# Patient Record
Sex: FEMALE | Race: BLACK OR AFRICAN AMERICAN | NOT HISPANIC OR LATINO | URBAN - METROPOLITAN AREA
[De-identification: names, ages, dates, MRNs, and addresses within clinical notes are randomized per-mention and may not be internally consistent; named-entity substitution may affect disease eponyms.]

---

## 2021-08-06 LAB — SARS-COV-2 AG RESP QL IA.RAPID: NEGATIVE

## 2021-08-10 ENCOUNTER — HISTORICAL (OUTPATIENT)
Dept: SURGERY | Facility: HOSPITAL | Age: 36
End: 2021-08-10

## 2022-05-05 NOTE — HISTORICAL [facility]
This is a historical note converted from Dru. Formatting and pictures may have been removed.  Please reference Dru for original formatting and attached multimedia. Indication for Surgery  36-year-old female?desired?improvement in the contour of her abdomen.??Options were discussed with her and she elected to proceed with abdominoplasty rectus muscle plication.??The risk benefits potential complications of surgery discussed in detail.  Preoperative Diagnosis  Unsatisfactory cosmetic appearance  Postoperative Diagnosis  Unsatisfactory cosmetic appearance, small supraumbilical hernia  Operation  1.??Abdominoplasty rectus muscle plication  2.??Primary repair?of small supraumbilical hernia  Surgeon(s)  Jorge Gutierrez  Assistant  None  Anesthesia  General  Findings  Small less than 1 cm?hernia located between the umbilicus and the xiphoid?repaired primarily  Specimen(s)  None  Complications  None  Technique  Patient was identified in the preoperative holding area.??Informed consent was reviewed.??Patient was taken to the operating room placed supine position and general endotracheal anesthesia was provided.??Timeout was taken everyone is in agreement.??She was prepped and draped in sterile surgical fashion.??She was marked?for abdominoplasty?incision was incised with 10 blade scalpel further dissection through subcutaneous tissues achieve electrocautery?dissection continued in a cephalic direction above the abdominal wall fascia elevating the skin and subcutaneous tissue.??The umbilicus was incised around and maintained on its stalk.??A very small supraumbilical hernia?was identified less than 1 cm in size?with a small amount of preperitoneal fat that was reduced back into the hernia this was closed with figure-of-eight?0 PDS sutures.??Dissection continued to the costal margins bilaterally?and to the xiphoid process superiorly.??After this was accomplished rectus muscle plication was performed?after the plication was  marked with methylene blue.??1 loop PDS suture was ran from the xiphoid to the pubis.??This was further reinforced?with 0 Vicryl sutures in interrupted fashion.??Patient was then placed in a reflex?position.??The area of abdominal flap resection was marked.??Skin was incised?and electrocautery was used?to remove?the skin and subcutaneous tissue.??Wound was irrigated hemostasis was insured with electrocautery.??The superficial fascial system in the deep dermis was closed with 0 Vicryl sutures in interrupted fashion?2-0 subcuticular Monocryl was used to approximate?the incision further.??The umbilicus was translocated through a circular incision?after the skin and subcutaneous tissue was removed electrocautery.??It was inset with 3-0 Vicryl sutures in the deep dermis and 3-0 subcuticular Monocryl.??Sterile dressings were applied.??There were no complications

## 2023-01-05 DIAGNOSIS — C50.911 MALIGNANT NEOPLASM OF RIGHT BREAST: Primary | ICD-10-CM

## 2023-01-05 DIAGNOSIS — C50.919 BREAST CANCER: Primary | ICD-10-CM

## 2023-01-23 ENCOUNTER — OFFICE VISIT (OUTPATIENT)
Dept: HEMATOLOGY/ONCOLOGY | Facility: CLINIC | Age: 38
End: 2023-01-23
Payer: MEDICAID

## 2023-01-23 VITALS
TEMPERATURE: 99 F | DIASTOLIC BLOOD PRESSURE: 73 MMHG | OXYGEN SATURATION: 99 % | WEIGHT: 211.19 LBS | RESPIRATION RATE: 18 BRPM | HEIGHT: 66 IN | HEART RATE: 75 BPM | SYSTOLIC BLOOD PRESSURE: 117 MMHG | BODY MASS INDEX: 33.94 KG/M2

## 2023-01-23 DIAGNOSIS — Z17.1 MALIGNANT NEOPLASM OF UPPER-OUTER QUADRANT OF RIGHT BREAST IN FEMALE, ESTROGEN RECEPTOR NEGATIVE: ICD-10-CM

## 2023-01-23 DIAGNOSIS — C50.411 MALIGNANT NEOPLASM OF UPPER-OUTER QUADRANT OF RIGHT BREAST IN FEMALE, ESTROGEN RECEPTOR NEGATIVE: ICD-10-CM

## 2023-01-23 DIAGNOSIS — Z17.1 MALIGNANT NEOPLASM OF UPPER-OUTER QUADRANT OF RIGHT BREAST IN FEMALE, ESTROGEN RECEPTOR NEGATIVE: Primary | ICD-10-CM

## 2023-01-23 DIAGNOSIS — C50.919 BREAST CANCER: ICD-10-CM

## 2023-01-23 DIAGNOSIS — C50.411 MALIGNANT NEOPLASM OF UPPER-OUTER QUADRANT OF RIGHT BREAST IN FEMALE, ESTROGEN RECEPTOR NEGATIVE: Primary | ICD-10-CM

## 2023-01-23 PROCEDURE — 99205 PR OFFICE/OUTPT VISIT, NEW, LEVL V, 60-74 MIN: ICD-10-PCS | Mod: ,,, | Performed by: STUDENT IN AN ORGANIZED HEALTH CARE EDUCATION/TRAINING PROGRAM

## 2023-01-23 PROCEDURE — 1159F PR MEDICATION LIST DOCUMENTED IN MEDICAL RECORD: ICD-10-PCS | Mod: CPTII,,, | Performed by: STUDENT IN AN ORGANIZED HEALTH CARE EDUCATION/TRAINING PROGRAM

## 2023-01-23 PROCEDURE — 3008F BODY MASS INDEX DOCD: CPT | Mod: CPTII,,, | Performed by: STUDENT IN AN ORGANIZED HEALTH CARE EDUCATION/TRAINING PROGRAM

## 2023-01-23 PROCEDURE — 3078F PR MOST RECENT DIASTOLIC BLOOD PRESSURE < 80 MM HG: ICD-10-PCS | Mod: CPTII,,, | Performed by: STUDENT IN AN ORGANIZED HEALTH CARE EDUCATION/TRAINING PROGRAM

## 2023-01-23 PROCEDURE — 3074F PR MOST RECENT SYSTOLIC BLOOD PRESSURE < 130 MM HG: ICD-10-PCS | Mod: CPTII,,, | Performed by: STUDENT IN AN ORGANIZED HEALTH CARE EDUCATION/TRAINING PROGRAM

## 2023-01-23 PROCEDURE — 99205 OFFICE O/P NEW HI 60 MIN: CPT | Mod: ,,, | Performed by: STUDENT IN AN ORGANIZED HEALTH CARE EDUCATION/TRAINING PROGRAM

## 2023-01-23 PROCEDURE — 3078F DIAST BP <80 MM HG: CPT | Mod: CPTII,,, | Performed by: STUDENT IN AN ORGANIZED HEALTH CARE EDUCATION/TRAINING PROGRAM

## 2023-01-23 PROCEDURE — 1159F MED LIST DOCD IN RCRD: CPT | Mod: CPTII,,, | Performed by: STUDENT IN AN ORGANIZED HEALTH CARE EDUCATION/TRAINING PROGRAM

## 2023-01-23 PROCEDURE — 3074F SYST BP LT 130 MM HG: CPT | Mod: CPTII,,, | Performed by: STUDENT IN AN ORGANIZED HEALTH CARE EDUCATION/TRAINING PROGRAM

## 2023-01-23 PROCEDURE — 3008F PR BODY MASS INDEX (BMI) DOCUMENTED: ICD-10-PCS | Mod: CPTII,,, | Performed by: STUDENT IN AN ORGANIZED HEALTH CARE EDUCATION/TRAINING PROGRAM

## 2023-01-23 RX ORDER — POLYETHYLENE GLYCOL 3350, SODIUM SULFATE ANHYDROUS, SODIUM BICARBONATE, SODIUM CHLORIDE, POTASSIUM CHLORIDE 236; 22.74; 6.74; 5.86; 2.97 G/4L; G/4L; G/4L; G/4L; G/4L
POWDER, FOR SOLUTION ORAL
COMMUNITY
Start: 2022-11-07 | End: 2023-01-31

## 2023-01-23 RX ORDER — LINACLOTIDE 290 UG/1
290 CAPSULE, GELATIN COATED ORAL
COMMUNITY
Start: 2022-11-07

## 2023-01-23 NOTE — PROGRESS NOTES
Referring provider:  Dr. Burgess  Reason for consultation:  Triple negative right breast cancer     Patient is a 37-year-old with no significant medical history who noticed a mass right breast in November 2022 which led to further workup including mammogram, ultrasound and biopsy, pathology from biopsy in January 2023 revealed triple negative breast cancer, grade 3.  Patient had further workup including a PET-CT with Dr. Burgess not reveal any distant metastatic disease.  She presented to our clinic on 01/23/2023 to establish care for further disease management.        Today, 01/23/2023, patient reports symptoms of pain around her breast mass site but denies any other foci of pain.  She denies any decreased appetite, weight loss, fevers, chills, headaches, vision changes or focal weakness.    Patient denies any history of smoking, alcohol use or recreational drug use.  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  She has an ECOG of 0.  She has a mother and sister accompanied to her initial visit.  Family history of lung cancer in her grandfather and liver cancer in 1 of her uncles.  No history of breast cancer in the family.      Pathology  01/03/2023 right breast core biopsy and right axillary lymph node core biopsy:  Invasive ductal carcinoma, grade 3, right axillary lymph node biopsy with fragments of poorly differentiated malignant tumor consistent with ductal carcinoma.  Normal lymph node identified.  ER negative, WV negative, HER2 IHC 0, Ki-67 85.3%.        Imaging   01/10/2023 PET-CT:  Hypermetabolic mass in the superior right breast in keeping with the known cancer popliteal additional areas of mild-to-moderate uptake in the superior and retroareolar right breast are understood with definite discrete mass on CT but worrisome for infiltrative breast cancer.  Multiple hypermetabolic right axillary subpectoral and right internal mammary lymph nodes consistent with  metastatic disease.  No other suspicious uptake.  Right breast mass measures 3 by 2.8 cm with an SUV of 13.7.  Inferior to the mass there was mild diffuse uptake associated with non-mass like dense breast tissue with an SUV of 4.0.  There is also abnormal uptake in the retroareolar right breast with definitive discrete mass can not be  from the dense glandular tissue with an SUV of 5.7 and measuring 1.5 x 1.1 cm.  These are concerning for infiltrative malignancies.    Past Medical History:   Diagnosis Date    Breast cancer     GERD (gastroesophageal reflux disease)        Past Surgical History:   Procedure Laterality Date    ABLATION      TUBAL LIGATION         Family History   Problem Relation Age of Onset    Cancer Maternal Grandfather     Diabetes Maternal Grandmother     Cancer Maternal Uncle        Social History     Socioeconomic History    Marital status: Single   Tobacco Use    Smoking status: Never    Smokeless tobacco: Never   Substance and Sexual Activity    Alcohol use: Yes     Comment: occasionally    Drug use: Never    Sexual activity: Yes     Partners: Male       Current Outpatient Medications   Medication Sig Dispense Refill    boric acid (PH-D) 600 mg vaginal suppository INSERT 1 SUPPOSITORY VAGINALLY DAILY AS NEEDED      GOLYTELY 236-22.74-6.74 -5.86 gram suspension Take by mouth.      LINZESS 290 mcg Cap capsule Take 290 mcg by mouth.       No current facility-administered medications for this visit.       Review of patient's allergies indicates:  Not on File      CONSTITUTIONAL: no fevers, no chills, no weight loss, no fatigue, no weakness  HEMATOLOGIC: no abnormal bleeding, no abnormal bruising, no drenching night sweats  ONCOLOGIC: no new masses or lumps  HEENT: no vision loss, no tinnitus or hearing loss, no nose bleeding, no dysphagia, no odynophagia  CVS: no chest pain, no palpitations, no dyspnea on exertion  RESP: no shortness of breath, no hemoptysis, no cough  GI: no nausea, no  vomiting, no diarrhea, no constipation, no melena, no hematochezia, no hematemesis, no abdominal pain, no increase in abdominal girth  : no dysuria, no hematuria, no hesitancy, no scrotal swelling, no discharge  INTEGUMENT: no rashes, no abnormal bruising, no nail pitting, no hyperpigmentation  BREAST:  Right breast pain  NEURO: no falls, no memory loss, no paresthesias or dysesthesias, no urofecal incontinence or retention, no loss of strength on any extremity  MSK: no back pain, no new joint pain, no joint swelling  PSYCH: no suicidal or homicidal ideation, no depression, no insomnia, no anhedonia  ENDOCRINE: no heat or cold intolerance, no polyuria, no polydipsia    Physical Exam:    Vitals:    01/23/23 1108   BP: 117/73   Pulse: 75   Resp: 18   Temp: 98.7 °F (37.1 °C)       ECOG PS 0  GA: AAOx3, NAD  HEENT: NCAT, PERRLA, EOMI, good dentition, moist oral mucous membranes  LYMPH: no cervical, axillary or supraclavicular adenopathy  CVS: s1s2 RRR, no M/R/G  RESP: CTA b/l, no crackles, no wheezes or rhonchi  BREAST:  Right breast with a approximately 4 cm mass in the upper outer quadrant, firm, tender to palpation, without overlying skin changes.  No adenopathy appreciated on my exam in the axilla, supraclavicular or internal mammary region.  Left breast without masses, lumps, skin changes or nipple changes.  ABD: soft, NT, ND, BS+, no hepatosplenomegaly  EXT: no deformities, no pedal edema  SKIN: no rashes, warm and dry  NEURO: normal mentation, strength 5/5 on all 4 extremities, no sensory deficits        Assessment and plan   # Right breast invasive ductal carcinoma, triple negative  ER negative, ID negative, HER2 IHC 0, Ki-67 85%   [id number], stage IIIC   Discussed with patient the diagnosis of triple negative breast cancer, treatment recommendations including neoadjuvant chemotherapy followed by surgery followed by adjuvant chemotherapy + radiation therapy   Reviewed PET-CT without evidence of distant  metastatic disease.  Will plan for treatment with neoadjuvant carbo Taxol pembrolizumab followed by AC pembrolizumab prior to surgical resection   Patient is scheduled for MRI bilateral breasts   Patient is not interested in fertility preservation  She is status post tubal ligation, discuss the need for dual contraception while on chemotherapy.    Plan   Cardiac echo  Chemo education   Genetic counseling   Port placement with surgery ASAP   Plan to initiate carbo Taxol pembrolizumab on 2nd February 2023, lab including urine pregnancy the day prior.    Plan to follow-up on 2nd February 2023 to discuss above workup prior to treatment initiation.      A total of  60 minutes were spent in review of records, interpretation of test, coordination of care, discussion and counseling with the patient.        Portions of the record may have been created with voice recognition software. Occasional wrong-word or sound-a-like substitutions may have occurred due to the inherent limitations of voice recognition software. Read the chart carefully and recognize, using context, where substitutions have occurred.

## 2023-01-24 ENCOUNTER — APPOINTMENT (OUTPATIENT)
Dept: RADIOLOGY | Facility: HOSPITAL | Age: 38
End: 2023-01-24
Attending: SURGERY
Payer: MEDICAID

## 2023-01-24 DIAGNOSIS — C50.911 MALIGNANT NEOPLASM OF RIGHT BREAST: ICD-10-CM

## 2023-01-24 PROCEDURE — 25500020 PHARM REV CODE 255

## 2023-01-24 PROCEDURE — 77049 MRI BREAST C-+ W/CAD BI: CPT | Mod: TC

## 2023-01-24 PROCEDURE — 77049 MRI BREAST W/WO CONTRAST, W/CAD, BILATERAL: ICD-10-PCS | Mod: 26,,, | Performed by: STUDENT IN AN ORGANIZED HEALTH CARE EDUCATION/TRAINING PROGRAM

## 2023-01-24 PROCEDURE — A9577 INJ MULTIHANCE: HCPCS

## 2023-01-24 PROCEDURE — 77049 MRI BREAST C-+ W/CAD BI: CPT | Mod: 26,,, | Performed by: STUDENT IN AN ORGANIZED HEALTH CARE EDUCATION/TRAINING PROGRAM

## 2023-01-24 RX ADMIN — GADOBENATE DIMEGLUMINE 20 ML: 529 INJECTION, SOLUTION INTRAVENOUS at 09:01

## 2023-01-30 ENCOUNTER — OFFICE VISIT (OUTPATIENT)
Dept: HEMATOLOGY/ONCOLOGY | Facility: CLINIC | Age: 38
End: 2023-01-30
Payer: MEDICAID

## 2023-01-30 ENCOUNTER — NURSE TRIAGE (OUTPATIENT)
Dept: ADMINISTRATIVE | Facility: CLINIC | Age: 38
End: 2023-01-30
Payer: MEDICAID

## 2023-01-30 VITALS
BODY MASS INDEX: 33.48 KG/M2 | HEART RATE: 90 BPM | TEMPERATURE: 98 F | RESPIRATION RATE: 18 BRPM | WEIGHT: 208.31 LBS | HEIGHT: 66 IN | OXYGEN SATURATION: 100 % | DIASTOLIC BLOOD PRESSURE: 84 MMHG | SYSTOLIC BLOOD PRESSURE: 137 MMHG

## 2023-01-30 DIAGNOSIS — C50.411 MALIGNANT NEOPLASM OF UPPER-OUTER QUADRANT OF RIGHT BREAST IN FEMALE, ESTROGEN RECEPTOR NEGATIVE: Primary | ICD-10-CM

## 2023-01-30 DIAGNOSIS — Z17.1 MALIGNANT NEOPLASM OF UPPER-OUTER QUADRANT OF RIGHT BREAST IN FEMALE, ESTROGEN RECEPTOR NEGATIVE: Primary | ICD-10-CM

## 2023-01-30 PROCEDURE — 3079F DIAST BP 80-89 MM HG: CPT | Mod: CPTII,,,

## 2023-01-30 PROCEDURE — 99215 OFFICE O/P EST HI 40 MIN: CPT | Mod: ,,,

## 2023-01-30 PROCEDURE — 1159F PR MEDICATION LIST DOCUMENTED IN MEDICAL RECORD: ICD-10-PCS | Mod: CPTII,,,

## 2023-01-30 PROCEDURE — 3075F PR MOST RECENT SYSTOLIC BLOOD PRESS GE 130-139MM HG: ICD-10-PCS | Mod: CPTII,,,

## 2023-01-30 PROCEDURE — 3075F SYST BP GE 130 - 139MM HG: CPT | Mod: CPTII,,,

## 2023-01-30 PROCEDURE — 1159F MED LIST DOCD IN RCRD: CPT | Mod: CPTII,,,

## 2023-01-30 PROCEDURE — 3079F PR MOST RECENT DIASTOLIC BLOOD PRESSURE 80-89 MM HG: ICD-10-PCS | Mod: CPTII,,,

## 2023-01-30 PROCEDURE — 3008F PR BODY MASS INDEX (BMI) DOCUMENTED: ICD-10-PCS | Mod: CPTII,,,

## 2023-01-30 PROCEDURE — 3008F BODY MASS INDEX DOCD: CPT | Mod: CPTII,,,

## 2023-01-30 PROCEDURE — 99215 PR OFFICE/OUTPT VISIT, EST, LEVL V, 40-54 MIN: ICD-10-PCS | Mod: ,,,

## 2023-01-30 RX ORDER — TRAMADOL HYDROCHLORIDE 50 MG/1
50 TABLET ORAL EVERY 4 HOURS
COMMUNITY
Start: 2023-01-26

## 2023-01-30 RX ORDER — ONDANSETRON HYDROCHLORIDE 8 MG/1
8 TABLET, FILM COATED ORAL EVERY 8 HOURS PRN
Qty: 30 TABLET | Refills: 1 | Status: SHIPPED | OUTPATIENT
Start: 2023-01-30 | End: 2024-04-01

## 2023-01-30 RX ORDER — DEXAMETHASONE 4 MG/1
TABLET ORAL
Qty: 10 TABLET | Refills: 5 | Status: SHIPPED | OUTPATIENT
Start: 2023-01-30 | End: 2023-04-10 | Stop reason: SDUPTHER

## 2023-01-30 RX ORDER — PROMETHAZINE HYDROCHLORIDE 12.5 MG/1
12.5 TABLET ORAL EVERY 4 HOURS PRN
Qty: 60 TABLET | Refills: 1 | Status: SHIPPED | OUTPATIENT
Start: 2023-01-30

## 2023-01-30 NOTE — PROGRESS NOTES
Referring provider:  Dr. Burgess  Reason for consultation:  Triple negative right breast cancer     Patient is a 37-year-old with no significant medical history who noticed a mass right breast in November 2022 which led to further workup including mammogram, ultrasound and biopsy, pathology from biopsy in January 2023 revealed triple negative breast cancer, grade 3.  Patient had further workup including a PET-CT with Dr. Burgess not reveal any distant metastatic disease.  She presented to our clinic on 01/23/2023 to establish care for further disease management.        Today, 01/30/2023, Patient returns today for chemo education with her sister and mother. She continues with pain around her breast mass site but denies any other foci of pain.  She denies any decreased appetite, weight loss, fevers, chills, headaches, vision changes or focal weakness.    Patient denies any history of smoking, alcohol use or recreational drug use.  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  She has an ECOG of 0.  She has a mother and sister accompanied to her initial visit.  Family history of lung cancer in her grandfather and liver cancer in 1 of her uncles.  No history of breast cancer in the family.      Pathology  01/03/2023 right breast core biopsy and right axillary lymph node core biopsy:  Invasive ductal carcinoma, grade 3, right axillary lymph node biopsy with fragments of poorly differentiated malignant tumor consistent with ductal carcinoma.  Normal lymph node identified.  ER negative, NV negative, HER2 IHC 0, Ki-67 85.3%.        Imaging   01/10/2023 PET-CT:  Hypermetabolic mass in the superior right breast in keeping with the known cancer popliteal additional areas of mild-to-moderate uptake in the superior and retroareolar right breast are understood with definite discrete mass on CT but worrisome for infiltrative breast cancer.  Multiple hypermetabolic right axillary  subpectoral and right internal mammary lymph nodes consistent with metastatic disease.  No other suspicious uptake.  Right breast mass measures 3 by 2.8 cm with an SUV of 13.7.  Inferior to the mass there was mild diffuse uptake associated with non-mass like dense breast tissue with an SUV of 4.0.  There is also abnormal uptake in the retroareolar right breast with definitive discrete mass can not be  from the dense glandular tissue with an SUV of 5.7 and measuring 1.5 x 1.1 cm.  These are concerning for infiltrative malignancies.    Past Medical History:   Diagnosis Date    Breast cancer     GERD (gastroesophageal reflux disease)        Past Surgical History:   Procedure Laterality Date    ABLATION      TUBAL LIGATION         Family History   Problem Relation Age of Onset    Cancer Maternal Grandfather     Diabetes Maternal Grandmother     Cancer Maternal Uncle        Social History     Socioeconomic History    Marital status: Single   Tobacco Use    Smoking status: Never    Smokeless tobacco: Never   Substance and Sexual Activity    Alcohol use: Yes     Comment: occasionally    Drug use: Never    Sexual activity: Yes     Partners: Male       Current Outpatient Medications   Medication Sig Dispense Refill    boric acid (PH-D) 600 mg vaginal suppository INSERT 1 SUPPOSITORY VAGINALLY DAILY AS NEEDED      GOLYTELY 236-22.74-6.74 -5.86 gram suspension Take by mouth.      LINZESS 290 mcg Cap capsule Take 290 mcg by mouth.       No current facility-administered medications for this visit.       Review of patient's allergies indicates:  Not on File      CONSTITUTIONAL: no fevers, no chills, no weight loss, no fatigue, no weakness  HEMATOLOGIC: no abnormal bleeding, no abnormal bruising, no drenching night sweats  ONCOLOGIC: no new masses or lumps  HEENT: no vision loss, no tinnitus or hearing loss, no nose bleeding, no dysphagia, no odynophagia  CVS: no chest pain, no palpitations, no dyspnea on exertion  RESP:  no shortness of breath, no hemoptysis, no cough  GI: no nausea, no vomiting, no diarrhea, no constipation, no melena, no hematochezia, no hematemesis, no abdominal pain, no increase in abdominal girth  : no dysuria, no hematuria, no hesitancy, no scrotal swelling, no discharge  INTEGUMENT: no rashes, no abnormal bruising, no nail pitting, no hyperpigmentation  BREAST:  Right breast pain  NEURO: no falls, no memory loss, no paresthesias or dysesthesias, no urofecal incontinence or retention, no loss of strength on any extremity  MSK: no back pain, no new joint pain, no joint swelling  PSYCH: no suicidal or homicidal ideation, no depression, no insomnia, no anhedonia  ENDOCRINE: no heat or cold intolerance, no polyuria, no polydipsia    Physical Exam:    Vitals:    01/30/23 1411   BP: 137/84   Pulse: 90   Resp: 18   Temp: 98.2 °F (36.8 °C)         ECOG PS 0  GA: AAOx3, NAD  HEENT: NCAT, PERRLA, EOMI, good dentition, moist oral mucous membranes  LYMPH: no cervical, axillary or supraclavicular adenopathy  CVS: s1s2 RRR, no M/R/G  RESP: CTA b/l, no crackles, no wheezes or rhonchi  BREAST:  Right breast with a approximately 4 cm mass in the upper outer quadrant, firm, tender to palpation, without overlying skin changes.  No adenopathy appreciated on my exam in the axilla, supraclavicular or internal mammary region.  Left breast without masses, lumps, skin changes or nipple changes.  ABD: soft, NT, ND, BS+, no hepatosplenomegaly  EXT: no deformities, no pedal edema  SKIN: no rashes, warm and dry  NEURO: normal mentation, strength 5/5 on all 4 extremities, no sensory deficits        Assessment and plan   # Right breast invasive ductal carcinoma, triple negative  ER negative, SD negative, HER2 IHC 0, Ki-67 85%   [id number], stage IIIC   Discussed with patient the diagnosis of triple negative breast cancer, treatment recommendations including neoadjuvant chemotherapy followed by surgery followed by adjuvant chemotherapy +  radiation therapy   Reviewed PET-CT without evidence of distant metastatic disease.  Will plan for treatment with neoadjuvant carbo Taxol pembrolizumab followed by AC pembrolizumab prior to surgical resection   Patient is scheduled for MRI bilateral breasts   Patient is not interested in fertility preservation  She is status post tubal ligation, discuss the need for dual contraception while on chemotherapy.    Plan   Cardiac echo completed.  Chemo education completed today  Genetic counseling 1/31/23  Mediport placed  Zofran, phenergan, and dexamethasone sent to pharmacy  Instructed patient to take 20 mg of dex 12 hours and 6 hours prior to chemo therapy for first 1-3 weeks of taxol.  Plan to initiate carbo Taxol pembrolizumab on 2nd February 2023, lab including urine pregnancy the day prior.    Plan to follow-up on 2nd February 2023 to discuss above workup prior to treatment initiation.      Chemotherapy teaching provided to patient. Plan of care including chemotherapy regimen, schedule expectations, potential side effects, as well as prevention/management of side effects were discussed in detail. Office contact information provided along with instructions on symptoms that warrant a call to the office, for example a temperature of > 100.5, uncontrolled side effects, severe fatigue etc. Time was given for patient to ask questions. All questions answered and they verbalized understanding. They were instructed to call with any concerns or additional questions.       DAMARIS Patino

## 2023-01-31 ENCOUNTER — OFFICE VISIT (OUTPATIENT)
Dept: HEMATOLOGY/ONCOLOGY | Facility: CLINIC | Age: 38
End: 2023-01-31
Payer: MEDICAID

## 2023-01-31 DIAGNOSIS — C50.411 MALIGNANT NEOPLASM OF UPPER-OUTER QUADRANT OF RIGHT BREAST IN FEMALE, ESTROGEN RECEPTOR NEGATIVE: ICD-10-CM

## 2023-01-31 DIAGNOSIS — Z17.1 MALIGNANT NEOPLASM OF UPPER-OUTER QUADRANT OF RIGHT BREAST IN FEMALE, ESTROGEN RECEPTOR NEGATIVE: ICD-10-CM

## 2023-01-31 PROCEDURE — 99204 OFFICE O/P NEW MOD 45 MIN: CPT | Mod: 95,,, | Performed by: NURSE PRACTITIONER

## 2023-01-31 PROCEDURE — 99204 PR OFFICE/OUTPT VISIT, NEW, LEVL IV, 45-59 MIN: ICD-10-PCS | Mod: 95,,, | Performed by: NURSE PRACTITIONER

## 2023-01-31 NOTE — PROGRESS NOTES
REFERRING PHYSICIAN: Dr. Italo Nelson    Subjective:       Patient ID: Thom Cordoba is a 37 y.o. female.    Chief Complaint: Personal history of recently diagnosed triple negative breast cancer (right IDC) and a family history of cancer. Genetic testing was requested to assist with surgical decision-making. Patient presented via Telemedicine Visit (audio and video) today for risk assessment, genetic counseling, and consideration for genetic testing.    HPI  Past Medical History:   Diagnosis Date    Breast cancer     GERD (gastroesophageal reflux disease)         Past Surgical History:   Procedure Laterality Date    ABLATION      MEDIPORT INSERTION, DOUBLE Left 01/26/2023    TUBAL LIGATION          Review of patient's allergies indicates:  Patient has no known allergies.     Review of Systems      Oncology History    No history exists.      Family History   Problem Relation Age of Onset    Diabetes Maternal Grandmother     Lung cancer Maternal Grandfather     Liver cancer Maternal Uncle     NOTE: paternal family history not known      Assessment:   Risk Assessment:  This patient is at increased risk of having a genetic mutation that increases the risk of cancer. She meets criteria for genetic testing based on the National Comprehensive Cancer Network (NCCN) criteria due to a personal history of triple negative breast cancer diagnosed under 60y (dx37y) and an unknown paternal family history  (see family history and pedigree). Based on her likelihood of having a mutation, myRisk testing through [facility] was described in detail.    Education and Counseling:  [facility] myRisk is a gene panel that evaluates a broad number of hereditary cancer syndromes to help define patients' cancer risk with a focus on eight primary cancer sites (breast, ovarian, gastric, colorectal, pancreatic, melanoma, prostate, and endometrial). This test is appropriate for patients that meet criteria for genetic testing for Breast  and Ovarian Cancer Syndrome. This panel will test for genes that increase cancer risk APC, SUHA, AXIN2, BAP1, BARD1, BMPR1A, BRCA1, BRCA2, BRIP1, CDH1, CDK4, CDKN2A, CHEK2, CTNNA1, FH, FLCN, HOXB13 (seq only), MEN1, MET, MLH1, MSH2, MSH3 (excluding repetitive portions of exon 1), MSH6, MUTYH, NTHL1, PALB2, PMS2, PTEN, RAD51C, RAD51D, SDHA, SDHB, SDHC, SDHD, SMAD4, STK11, TP53, TSC1, TSC2, VHL.    Risks of cancer associated with inherited cancer predisposition mutations were discussed in detail.  If a mutation were found, this patient would have a significantly increased risk for cancer.  It has been shown that individuals with a BRCA1 or BRCA2 mutation face a 56-87% lifetime risk of breast cancer and a 27-44% lifetime risk of ovarian cancer. Mutation carriers who have had breast cancer have a 20% (BRCA1) or 12% (BRCA2) chance of developing a second breast cancer within 5 years, and up to 64% (BRCA1) or 52% (BRCA2) of a second breast cancer by age 70. Mutation carriers have up to a 5% risk of pancreatic cancer, as well as increased risk for other cancers such as colon cancer and lymphoma. Other inherited cancer syndromes that are also included in the myRisk test, may have different, but still significant risk for cancer.    The availability of clinical management options for inherited cancer predisposition mutation carriers was discussed, including increased surveillance, chemo prevention, and prophylactic surgery. Details of the testing process, including benefits and limitations of genetic analysis as well as the implications of possible test results, were discussed.  Because this patient is the first member of her family to be tested comprehensive testing was presented.  Related insurance issues were discussed.      Summary:  This patient was evaluated for hereditary risk of cancer and was found to be at an increased risk of having an inherited cancer predisposition mutation.  The option of genetic testing was  explained in detail, including the possible impact of this information on family members.  Since this patient wishes to proceed with testing an informed consent will be obtained and blood drawn and sent to [facility].  Results will be expected 4 weeks from this time.  A follow-up appointment will be scheduled for results disclosure.        The patient location is: work in [place]    Visit type: audiovisual    Face to Face time with patient: 20 minutes  45 minutes of total time spent on the encounter, which includes face to face time and non-face to face time preparing to see the patient (eg, review of tests), Obtaining and/or reviewing separately obtained history, Documenting clinical information in the electronic or other health record, Independently interpreting results (not separately reported) and communicating results to the patient/family/caregiver, or Care coordination (not separately reported).       Each patient to whom he or she provides medical services by telemedicine is:  (1) informed of the relationship between the physician and patient and the respective role of any other health care provider with respect to management of the patient; and (2) notified that he or she may decline to receive medical services by telemedicine and may withdraw from such care at any time.      ALY URRUTIA, PhD     Answers submitted by the patient for this visit:  Review of Systems Questionnaire (Submitted on 1/24/2023)  appetite change : No  unexpected weight change: No  mouth sores: No  visual disturbance: No  cough: No  shortness of breath: No  chest pain: No  abdominal pain: No  diarrhea: No  frequency: No  back pain: Yes  rash: No  headaches: No  adenopathy: No  nervous/ anxious: No

## 2023-01-31 NOTE — TELEPHONE ENCOUNTER
"Patient reports breast pain worse since this evening. Patient has a hx of Breast CA and states breast is more swollen than usual. Rates pain 10/10. Advised patient of dispo to see HCP within 3 days. Verbalized understanding. Advised to call back if symptoms become worse or with further questions.        Reason for Disposition   Breast lump    Additional Information   Negative: [1] SEVERE breast pain AND [2] fever > 103 F (39.4 C)   Negative: Patient sounds very sick or weak to the triager   Negative: [1] Breast looks infected (spreading redness, feels hot or painful to touch) AND [2] fever   Negative: [1] Breast looks infected (spreading redness, feels hot or painful to touch) AND [2] no fever   Negative: [1] Painful rash AND [2] multiple small blisters grouped together (i.e., dermatomal distribution or "band" or "stripe")   Negative: [1] Cuts, burns, or bruises of breasts AND [2] suspicious history for the injury    Protocols used: Breast Symptoms-A-AH    "

## 2023-02-02 ENCOUNTER — OFFICE VISIT (OUTPATIENT)
Dept: HEMATOLOGY/ONCOLOGY | Facility: CLINIC | Age: 38
End: 2023-02-02
Payer: MEDICAID

## 2023-02-02 VITALS
BODY MASS INDEX: 33.29 KG/M2 | TEMPERATURE: 98 F | HEIGHT: 66 IN | DIASTOLIC BLOOD PRESSURE: 84 MMHG | HEART RATE: 74 BPM | WEIGHT: 207.13 LBS | RESPIRATION RATE: 18 BRPM | OXYGEN SATURATION: 99 % | SYSTOLIC BLOOD PRESSURE: 138 MMHG

## 2023-02-02 DIAGNOSIS — Z17.1 MALIGNANT NEOPLASM OF UPPER-OUTER QUADRANT OF RIGHT BREAST IN FEMALE, ESTROGEN RECEPTOR NEGATIVE: Primary | ICD-10-CM

## 2023-02-02 DIAGNOSIS — Z51.11 ENCOUNTER FOR ANTINEOPLASTIC CHEMOTHERAPY: ICD-10-CM

## 2023-02-02 DIAGNOSIS — C50.411 MALIGNANT NEOPLASM OF UPPER-OUTER QUADRANT OF RIGHT BREAST IN FEMALE, ESTROGEN RECEPTOR NEGATIVE: Primary | ICD-10-CM

## 2023-02-02 PROCEDURE — 1159F PR MEDICATION LIST DOCUMENTED IN MEDICAL RECORD: ICD-10-PCS | Mod: CPTII,,, | Performed by: STUDENT IN AN ORGANIZED HEALTH CARE EDUCATION/TRAINING PROGRAM

## 2023-02-02 PROCEDURE — 3075F PR MOST RECENT SYSTOLIC BLOOD PRESS GE 130-139MM HG: ICD-10-PCS | Mod: CPTII,,, | Performed by: STUDENT IN AN ORGANIZED HEALTH CARE EDUCATION/TRAINING PROGRAM

## 2023-02-02 PROCEDURE — 3008F BODY MASS INDEX DOCD: CPT | Mod: CPTII,,, | Performed by: STUDENT IN AN ORGANIZED HEALTH CARE EDUCATION/TRAINING PROGRAM

## 2023-02-02 PROCEDURE — 3075F SYST BP GE 130 - 139MM HG: CPT | Mod: CPTII,,, | Performed by: STUDENT IN AN ORGANIZED HEALTH CARE EDUCATION/TRAINING PROGRAM

## 2023-02-02 PROCEDURE — 3079F DIAST BP 80-89 MM HG: CPT | Mod: CPTII,,, | Performed by: STUDENT IN AN ORGANIZED HEALTH CARE EDUCATION/TRAINING PROGRAM

## 2023-02-02 PROCEDURE — 3079F PR MOST RECENT DIASTOLIC BLOOD PRESSURE 80-89 MM HG: ICD-10-PCS | Mod: CPTII,,, | Performed by: STUDENT IN AN ORGANIZED HEALTH CARE EDUCATION/TRAINING PROGRAM

## 2023-02-02 PROCEDURE — 1159F MED LIST DOCD IN RCRD: CPT | Mod: CPTII,,, | Performed by: STUDENT IN AN ORGANIZED HEALTH CARE EDUCATION/TRAINING PROGRAM

## 2023-02-02 PROCEDURE — 3008F PR BODY MASS INDEX (BMI) DOCUMENTED: ICD-10-PCS | Mod: CPTII,,, | Performed by: STUDENT IN AN ORGANIZED HEALTH CARE EDUCATION/TRAINING PROGRAM

## 2023-02-02 PROCEDURE — 99215 PR OFFICE/OUTPT VISIT, EST, LEVL V, 40-54 MIN: ICD-10-PCS | Mod: ,,, | Performed by: STUDENT IN AN ORGANIZED HEALTH CARE EDUCATION/TRAINING PROGRAM

## 2023-02-02 PROCEDURE — 99215 OFFICE O/P EST HI 40 MIN: CPT | Mod: ,,, | Performed by: STUDENT IN AN ORGANIZED HEALTH CARE EDUCATION/TRAINING PROGRAM

## 2023-02-02 RX ORDER — FAMOTIDINE 10 MG/ML
20 INJECTION INTRAVENOUS
Status: CANCELLED | OUTPATIENT
Start: 2023-02-02

## 2023-02-02 RX ORDER — EPINEPHRINE 0.3 MG/.3ML
0.3 INJECTION SUBCUTANEOUS ONCE AS NEEDED
Status: CANCELLED | OUTPATIENT
Start: 2023-02-02

## 2023-02-02 RX ORDER — HEPARIN 100 UNIT/ML
500 SYRINGE INTRAVENOUS
Status: CANCELLED | OUTPATIENT
Start: 2023-02-02

## 2023-02-02 RX ORDER — DIPHENHYDRAMINE HYDROCHLORIDE 50 MG/ML
50 INJECTION INTRAMUSCULAR; INTRAVENOUS ONCE AS NEEDED
Status: CANCELLED | OUTPATIENT
Start: 2023-02-02

## 2023-02-02 RX ORDER — SODIUM CHLORIDE 0.9 % (FLUSH) 0.9 %
10 SYRINGE (ML) INJECTION
Status: CANCELLED | OUTPATIENT
Start: 2023-02-02

## 2023-02-02 NOTE — PROGRESS NOTES
Referring provider:  Dr. Burgess  Reason for consultation:  Triple negative right breast cancer     Patient is a 37-year-old with no significant medical history who noticed a mass right breast in November 2022 which led to further workup including mammogram, ultrasound and biopsy, pathology from biopsy in January 2023 revealed triple negative breast cancer, grade 3.  Patient had further workup including a PET-CT with Dr. Burgess not reveal any distant metastatic disease.  She presented to our clinic on 01/23/2023 to establish care for further disease management.        Today, 02/02/23, denies any concerns today.  She does report burning/pain around the tumor site but denies any new foci of pain, decreased appetite, weight loss.  She reports mild nausea controlled with Zofran.  She denies any fevers, chills, drenching night sweats.        Patient denies any history of smoking, alcohol use or recreational drug use.  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  She has an ECOG of 0.  She has a mother and sister accompanied to her initial visit.  Family history of lung cancer in her grandfather and liver cancer in 1 of her uncles.  No history of breast cancer in the family.      Pathology  01/03/2023 right breast core biopsy and right axillary lymph node core biopsy:  Invasive ductal carcinoma, grade 3, right axillary lymph node biopsy with fragments of poorly differentiated malignant tumor consistent with ductal carcinoma.  Normal lymph node identified.  ER negative, [place] negative, HER2 IHC 0, Ki-67 85.3%.        Imaging     01/27/2022 echo EF 60%     01/10/2023 PET-CT:  Hypermetabolic mass in the superior right breast in keeping with the known cancer popliteal additional areas of mild-to-moderate uptake in the superior and retroareolar right breast are understood with definite discrete mass on CT but worrisome for infiltrative breast cancer.  Multiple hypermetabolic right  axillary subpectoral and right internal mammary lymph nodes consistent with metastatic disease.  No other suspicious uptake.  Right breast mass measures 3 by 2.8 cm with an SUV of 13.7.  Inferior to the mass there was mild diffuse uptake associated with non-mass like dense breast tissue with an SUV of 4.0.  There is also abnormal uptake in the retroareolar right breast with definitive discrete mass can not be  from the dense glandular tissue with an SUV of 5.7 and measuring 1.5 x 1.1 cm.  These are concerning for infiltrative malignancies.      Laboratory   02/01/2023:  Creatinine 0.83, albumin 3.7, LFTs within normal limits, TSH 1.09, cortisol 8.8, free T4 0.98, WBC 7.7, Hb 11.5, MCV 87.9, platelet count 283, ANC 4.09, urine pregnancy negative.    Past Medical History:   Diagnosis Date    Breast cancer     GERD (gastroesophageal reflux disease)        Past Surgical History:   Procedure Laterality Date    ABLATION      MEDIPORT INSERTION, DOUBLE Left 01/26/2023    TUBAL LIGATION         Family History   Problem Relation Age of Onset    Diabetes Maternal Grandmother     Lung cancer Maternal Grandfather     Liver cancer Maternal Uncle        Social History     Socioeconomic History    Marital status: Single   Tobacco Use    Smoking status: Never    Smokeless tobacco: Never   Substance and Sexual Activity    Alcohol use: Yes     Comment: occasionally    Drug use: Never    Sexual activity: Yes     Partners: Male       Current Outpatient Medications   Medication Sig Dispense Refill    boric acid (PH-D) 600 mg vaginal suppository INSERT 1 SUPPOSITORY VAGINALLY DAILY AS NEEDED      dexAMETHasone (DECADRON) 4 MG Tab Take 5 tablets by mouth at 12 hours and 6 hours prior to chemotherapy. 10 tablet 5    LINZESS 290 mcg Cap capsule Take 290 mcg by mouth.      ondansetron (ZOFRAN) 8 MG tablet Take 1 tablet (8 mg total) by mouth every 8 (eight) hours as needed for Nausea. 30 tablet 1    promethazine (PHENERGAN) 12.5 MG  Tab Take 1 tablet (12.5 mg total) by mouth every 4 (four) hours as needed. 60 tablet 1    traMADoL (ULTRAM) 50 mg tablet Take 50 mg by mouth every 4 (four) hours.       No current facility-administered medications for this visit.       Review of patient's allergies indicates:  No Known Allergies      CONSTITUTIONAL: no fevers, no chills, no weight loss, no fatigue, no weakness  HEMATOLOGIC: no abnormal bleeding, no abnormal bruising, no drenching night sweats  ONCOLOGIC: no new masses or lumps  HEENT: no vision loss, no tinnitus or hearing loss, no nose bleeding, no dysphagia, no odynophagia  CVS: no chest pain, no palpitations, no dyspnea on exertion  RESP: no shortness of breath, no hemoptysis, no cough  GI: no nausea, no vomiting, no diarrhea, no constipation, no melena, no hematochezia, no hematemesis, no abdominal pain, no increase in abdominal girth  : no dysuria, no hematuria, no hesitancy, no scrotal swelling, no discharge  INTEGUMENT: no rashes, no abnormal bruising, no nail pitting, no hyperpigmentation  BREAST:  Right breast pain  NEURO: no falls, no memory loss, no paresthesias or dysesthesias, no urofecal incontinence or retention, no loss of strength on any extremity  MSK: no back pain, no new joint pain, no joint swelling  PSYCH: no suicidal or homicidal ideation, no depression, no insomnia, no anhedonia  ENDOCRINE: no heat or cold intolerance, no polyuria, no polydipsia    Physical Exam:    Vitals:    02/02/23 0810   BP: 138/84   Pulse: 74   Resp: 18   Temp: 98.3 °F (36.8 °C)         ECOG PS 0  GA: AAOx3, NAD  HEENT: NCAT, PERRLA, EOMI, good dentition, moist oral mucous membranes  LYMPH: no cervical, axillary or supraclavicular adenopathy  CVS: s1s2 RRR, no M/R/G  RESP: CTA b/l, no crackles, no wheezes or rhonchi  BREAST:  Deferred, done 9 days back.     ABD: soft, NT, ND, BS+, no hepatosplenomegaly  EXT: no deformities, no pedal edema  SKIN: no rashes, warm and dry  NEURO: normal mentation,  strength 5/5 on all 4 extremities, no sensory deficits        Assessment and plan   # Right breast invasive ductal carcinoma, triple negative  ER negative, [place] negative, HER2 IHC 0, Ki-67 85%   [id number], stage IIIC   Discussed with patient the diagnosis of triple negative breast cancer, treatment recommendations including neoadjuvant chemotherapy followed by surgery followed by adjuvant chemotherapy + radiation therapy   Reviewed PET-CT without evidence of distant metastatic disease.  Will plan for treatment with neoadjuvant carbo Taxol pembrolizumab followed by AC pembrolizumab prior to surgical resection   Patient is scheduled for MRI bilateral breasts   Patient is not interested in fertility preservation  She is status post tubal ligation, discuss the need for dual contraception while on chemotherapy.  Baseline Echo 60%  S/p port placement and chemo ed  Genetic counseling done, test pending, to be drawn in clinic today.    Plan   Proceed with cycle 1 day 1 of pembro carbo Taxol today  Plan to follow-up in clinic in 1 week with repeat labs prior to cycle 1 day 8 to assess treatment tolerance.        A total of 40  minutes were spent in review of records, interpretation of test, coordination of care, discussion and counseling with the patient.        Portions of the record may have been created with voice recognition software. Occasional wrong-word or sound-a-like substitutions may have occurred due to the inherent limitations of voice recognition software. Read the chart carefully and recognize, using context, where substitutions have occurred.

## 2023-02-09 ENCOUNTER — OFFICE VISIT (OUTPATIENT)
Dept: HEMATOLOGY/ONCOLOGY | Facility: CLINIC | Age: 38
End: 2023-02-09
Payer: MEDICAID

## 2023-02-09 VITALS
HEIGHT: 66 IN | HEART RATE: 93 BPM | SYSTOLIC BLOOD PRESSURE: 110 MMHG | RESPIRATION RATE: 20 BRPM | BODY MASS INDEX: 32.6 KG/M2 | WEIGHT: 202.88 LBS | OXYGEN SATURATION: 97 % | DIASTOLIC BLOOD PRESSURE: 75 MMHG | TEMPERATURE: 98 F

## 2023-02-09 DIAGNOSIS — C50.411 MALIGNANT NEOPLASM OF UPPER-OUTER QUADRANT OF RIGHT BREAST IN FEMALE, ESTROGEN RECEPTOR NEGATIVE: Primary | ICD-10-CM

## 2023-02-09 DIAGNOSIS — Z17.1 MALIGNANT NEOPLASM OF UPPER-OUTER QUADRANT OF RIGHT BREAST IN FEMALE, ESTROGEN RECEPTOR NEGATIVE: Primary | ICD-10-CM

## 2023-02-09 PROCEDURE — 1159F MED LIST DOCD IN RCRD: CPT | Mod: CPTII,,,

## 2023-02-09 PROCEDURE — 1159F PR MEDICATION LIST DOCUMENTED IN MEDICAL RECORD: ICD-10-PCS | Mod: CPTII,,,

## 2023-02-09 PROCEDURE — 99215 PR OFFICE/OUTPT VISIT, EST, LEVL V, 40-54 MIN: ICD-10-PCS | Mod: ,,,

## 2023-02-09 PROCEDURE — 3074F SYST BP LT 130 MM HG: CPT | Mod: CPTII,,,

## 2023-02-09 PROCEDURE — 3074F PR MOST RECENT SYSTOLIC BLOOD PRESSURE < 130 MM HG: ICD-10-PCS | Mod: CPTII,,,

## 2023-02-09 PROCEDURE — 3078F PR MOST RECENT DIASTOLIC BLOOD PRESSURE < 80 MM HG: ICD-10-PCS | Mod: CPTII,,,

## 2023-02-09 PROCEDURE — 3008F BODY MASS INDEX DOCD: CPT | Mod: CPTII,,,

## 2023-02-09 PROCEDURE — 3008F PR BODY MASS INDEX (BMI) DOCUMENTED: ICD-10-PCS | Mod: CPTII,,,

## 2023-02-09 PROCEDURE — 99215 OFFICE O/P EST HI 40 MIN: CPT | Mod: ,,,

## 2023-02-09 PROCEDURE — 3078F DIAST BP <80 MM HG: CPT | Mod: CPTII,,,

## 2023-02-09 RX ORDER — FAMOTIDINE 10 MG/ML
20 INJECTION INTRAVENOUS
Status: CANCELLED | OUTPATIENT
Start: 2023-02-09

## 2023-02-09 RX ORDER — DIPHENHYDRAMINE HYDROCHLORIDE 50 MG/ML
50 INJECTION INTRAMUSCULAR; INTRAVENOUS ONCE AS NEEDED
Status: CANCELLED | OUTPATIENT
Start: 2023-02-09

## 2023-02-09 RX ORDER — HEPARIN 100 UNIT/ML
500 SYRINGE INTRAVENOUS
Status: CANCELLED | OUTPATIENT
Start: 2023-02-09

## 2023-02-09 RX ORDER — EPINEPHRINE 0.3 MG/.3ML
0.3 INJECTION SUBCUTANEOUS ONCE AS NEEDED
Status: CANCELLED | OUTPATIENT
Start: 2023-02-09

## 2023-02-09 RX ORDER — SODIUM CHLORIDE 0.9 % (FLUSH) 0.9 %
10 SYRINGE (ML) INJECTION
Status: CANCELLED | OUTPATIENT
Start: 2023-02-09

## 2023-02-09 NOTE — PROGRESS NOTES
Referring provider:  Dr. Burgess  Reason for consultation:  Triple negative right breast cancer     Patient is a 37-year-old with no significant medical history who noticed a mass right breast in November 2022 which led to further workup including mammogram, ultrasound and biopsy, pathology from biopsy in January 2023 revealed triple negative breast cancer, grade 3.  Patient had further workup including a PET-CT with Dr. Burgess not reveal any distant metastatic disease.  She presented to our clinic on 01/23/2023 to establish care for further disease management.        Today, 02/09/23, denies any concerns today.  She does report burning/pain around the tumor site but denies any new foci of pain, decreased appetite, weight loss.  She reports mild nausea controlled with Zofran.  She denies any fevers, chills, drenching night sweats.        Patient denies any history of smoking, alcohol use or recreational drug use.  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  She has an ECOG of 0.  She has a mother and sister accompanied to her initial visit.  Family history of lung cancer in her grandfather and liver cancer in 1 of her uncles.  No history of breast cancer in the family.      Pathology  01/03/2023 right breast core biopsy and right axillary lymph node core biopsy:  Invasive ductal carcinoma, grade 3, right axillary lymph node biopsy with fragments of poorly differentiated malignant tumor consistent with ductal carcinoma.  Normal lymph node identified.  ER negative, CT negative, HER2 IHC 0, Ki-67 85.3%.        Imaging     01/27/2022 echo EF 60%     01/10/2023 PET-CT:  Hypermetabolic mass in the superior right breast in keeping with the known cancer popliteal additional areas of mild-to-moderate uptake in the superior and retroareolar right breast are understood with definite discrete mass on CT but worrisome for infiltrative breast cancer.  Multiple hypermetabolic right  axillary subpectoral and right internal mammary lymph nodes consistent with metastatic disease.  No other suspicious uptake.  Right breast mass measures 3 by 2.8 cm with an SUV of 13.7.  Inferior to the mass there was mild diffuse uptake associated with non-mass like dense breast tissue with an SUV of 4.0.  There is also abnormal uptake in the retroareolar right breast with definitive discrete mass can not be  from the dense glandular tissue with an SUV of 5.7 and measuring 1.5 x 1.1 cm.  These are concerning for infiltrative malignancies.      Laboratory   02/08/23: cr 0.79, alb 3.5, ca 9.3, AST 36, ALT 45, mag 1.9, phosphorus 2.1, wbc 4.47, hgb 11.1, plt 264, ANC 2.28  02/01/2023:  Creatinine 0.83, albumin 3.7, LFTs within normal limits, TSH 1.09, cortisol 8.8, free T4 0.98, WBC 7.7, Hb 11.5, MCV 87.9, platelet count 283, ANC 4.09, urine pregnancy negative.      Past Medical History:   Diagnosis Date    Breast cancer     GERD (gastroesophageal reflux disease)        Past Surgical History:   Procedure Laterality Date    ABLATION      MEDIPORT INSERTION, DOUBLE Left 01/26/2023    TUBAL LIGATION         Family History   Problem Relation Age of Onset    Diabetes Maternal Grandmother     Lung cancer Maternal Grandfather     Liver cancer Maternal Uncle        Social History     Socioeconomic History    Marital status: Single   Tobacco Use    Smoking status: Never    Smokeless tobacco: Never   Substance and Sexual Activity    Alcohol use: Yes     Comment: occasionally    Drug use: Never    Sexual activity: Yes     Partners: Male       Current Outpatient Medications   Medication Sig Dispense Refill    boric acid (PH-D) 600 mg vaginal suppository INSERT 1 SUPPOSITORY VAGINALLY DAILY AS NEEDED      dexAMETHasone (DECADRON) 4 MG Tab Take 5 tablets by mouth at 12 hours and 6 hours prior to chemotherapy. 10 tablet 5    LINZESS 290 mcg Cap capsule Take 290 mcg by mouth.      ondansetron (ZOFRAN) 8 MG tablet Take 1  tablet (8 mg total) by mouth every 8 (eight) hours as needed for Nausea. 30 tablet 1    promethazine (PHENERGAN) 12.5 MG Tab Take 1 tablet (12.5 mg total) by mouth every 4 (four) hours as needed. 60 tablet 1    traMADoL (ULTRAM) 50 mg tablet Take 50 mg by mouth every 4 (four) hours.       No current facility-administered medications for this visit.       Review of patient's allergies indicates:  No Known Allergies      CONSTITUTIONAL: no fevers, no chills, no weight loss, no fatigue, no weakness  HEMATOLOGIC: no abnormal bleeding, no abnormal bruising, no drenching night sweats  ONCOLOGIC: no new masses or lumps  HEENT: no vision loss, no tinnitus or hearing loss, no nose bleeding, no dysphagia, no odynophagia  CVS: no chest pain, no palpitations, no dyspnea on exertion  RESP: no shortness of breath, no hemoptysis, no cough  GI: no nausea, no vomiting, no diarrhea, no constipation, no melena, no hematochezia, no hematemesis, no abdominal pain, no increase in abdominal girth  : no dysuria, no hematuria, no hesitancy, no scrotal swelling, no discharge  INTEGUMENT: no rashes, no abnormal bruising, no nail pitting, no hyperpigmentation  BREAST:  Right breast pain  NEURO: no falls, no memory loss, no paresthesias or dysesthesias, no urofecal incontinence or retention, no loss of strength on any extremity  MSK: no back pain, no new joint pain, no joint swelling  PSYCH: no suicidal or homicidal ideation, no depression, no insomnia, no anhedonia  ENDOCRINE: no heat or cold intolerance, no polyuria, no polydipsia    Physical Exam:    Vitals:    02/09/23 0918   BP: 110/75   Pulse: 93   Resp: 20   Temp: 97.7 °F (36.5 °C)         ECOG PS 0  GA: AAOx3, NAD  HEENT: NCAT, PERRLA, EOMI, good dentition, moist oral mucous membranes  LYMPH: no cervical, axillary or supraclavicular adenopathy  CVS: s1s2 RRR, no M/R/G  RESP: CTA b/l, no crackles, no wheezes or rhonchi  BREAST:  Deferred, done 9 days back.     ABD: soft, NT, ND, BS+,  no hepatosplenomegaly  EXT: no deformities, no pedal edema  SKIN: no rashes, warm and dry  NEURO: normal mentation, strength 5/5 on all 4 extremities, no sensory deficits        Assessment and plan   # Right breast invasive ductal carcinoma, triple negative  ER negative, [place] negative, HER2 IHC 0, Ki-67 85%   [id number], stage IIIC   Discussed with patient the diagnosis of triple negative breast cancer, treatment recommendations including neoadjuvant chemotherapy followed by surgery followed by adjuvant chemotherapy + radiation therapy   Reviewed PET-CT without evidence of distant metastatic disease.  Will plan for treatment with neoadjuvant carbo Taxol pembrolizumab followed by AC pembrolizumab prior to surgical resection   Patient is scheduled for MRI bilateral breasts   Patient is not interested in fertility preservation  She is status post tubal ligation, discuss the need for dual contraception while on chemotherapy.  Baseline Echo 60%  S/p port placement and chemo ed  Genetic counseling done, test pending, to be drawn in clinic today.    Plan   Proceed with cycle 1 day 8 of pembro carbo Taxol today  Plan to follow-up in clinic in 1 week with repeat labs prior to cycle 1 day 15 to assess treatment tolerance.        A total of 40  minutes were spent in review of records, interpretation of test, coordination of care, discussion and counseling with the patient.

## 2023-02-16 ENCOUNTER — OFFICE VISIT (OUTPATIENT)
Dept: HEMATOLOGY/ONCOLOGY | Facility: CLINIC | Age: 38
End: 2023-02-16
Payer: MEDICAID

## 2023-02-16 VITALS
RESPIRATION RATE: 18 BRPM | TEMPERATURE: 98 F | OXYGEN SATURATION: 98 % | HEART RATE: 95 BPM | WEIGHT: 204 LBS | SYSTOLIC BLOOD PRESSURE: 110 MMHG | HEIGHT: 66 IN | DIASTOLIC BLOOD PRESSURE: 75 MMHG | BODY MASS INDEX: 32.78 KG/M2

## 2023-02-16 DIAGNOSIS — Z17.1 MALIGNANT NEOPLASM OF UPPER-OUTER QUADRANT OF RIGHT BREAST IN FEMALE, ESTROGEN RECEPTOR NEGATIVE: Primary | ICD-10-CM

## 2023-02-16 DIAGNOSIS — C50.411 MALIGNANT NEOPLASM OF UPPER-OUTER QUADRANT OF RIGHT BREAST IN FEMALE, ESTROGEN RECEPTOR NEGATIVE: Primary | ICD-10-CM

## 2023-02-16 PROCEDURE — 3008F PR BODY MASS INDEX (BMI) DOCUMENTED: ICD-10-PCS | Mod: CPTII,,,

## 2023-02-16 PROCEDURE — 3074F SYST BP LT 130 MM HG: CPT | Mod: CPTII,,,

## 2023-02-16 PROCEDURE — 1159F PR MEDICATION LIST DOCUMENTED IN MEDICAL RECORD: ICD-10-PCS | Mod: CPTII,,,

## 2023-02-16 PROCEDURE — 3078F PR MOST RECENT DIASTOLIC BLOOD PRESSURE < 80 MM HG: ICD-10-PCS | Mod: CPTII,,,

## 2023-02-16 PROCEDURE — 99215 PR OFFICE/OUTPT VISIT, EST, LEVL V, 40-54 MIN: ICD-10-PCS | Mod: ,,,

## 2023-02-16 PROCEDURE — 3078F DIAST BP <80 MM HG: CPT | Mod: CPTII,,,

## 2023-02-16 PROCEDURE — 3008F BODY MASS INDEX DOCD: CPT | Mod: CPTII,,,

## 2023-02-16 PROCEDURE — 99215 OFFICE O/P EST HI 40 MIN: CPT | Mod: ,,,

## 2023-02-16 PROCEDURE — 1159F MED LIST DOCD IN RCRD: CPT | Mod: CPTII,,,

## 2023-02-16 PROCEDURE — 3074F PR MOST RECENT SYSTOLIC BLOOD PRESSURE < 130 MM HG: ICD-10-PCS | Mod: CPTII,,,

## 2023-02-16 RX ORDER — FAMOTIDINE 10 MG/ML
20 INJECTION INTRAVENOUS
Status: CANCELLED | OUTPATIENT
Start: 2023-02-16

## 2023-02-16 RX ORDER — HEPARIN 100 UNIT/ML
500 SYRINGE INTRAVENOUS
Status: CANCELLED | OUTPATIENT
Start: 2023-02-16

## 2023-02-16 RX ORDER — DIPHENHYDRAMINE HYDROCHLORIDE 50 MG/ML
50 INJECTION INTRAMUSCULAR; INTRAVENOUS ONCE AS NEEDED
Status: CANCELLED | OUTPATIENT
Start: 2023-02-16

## 2023-02-16 RX ORDER — EPINEPHRINE 0.3 MG/.3ML
0.3 INJECTION SUBCUTANEOUS ONCE AS NEEDED
Status: CANCELLED | OUTPATIENT
Start: 2023-02-16

## 2023-02-16 RX ORDER — SODIUM CHLORIDE 0.9 % (FLUSH) 0.9 %
10 SYRINGE (ML) INJECTION
Status: CANCELLED | OUTPATIENT
Start: 2023-02-16

## 2023-02-16 NOTE — PROGRESS NOTES
Referring provider:  Dr. Burgess  Reason for consultation:  Triple negative right breast cancer     Patient is a 37-year-old with no significant medical history who noticed a mass right breast in November 2022 which led to further workup including mammogram, ultrasound and biopsy, pathology from biopsy in January 2023 revealed triple negative breast cancer, grade 3.  Patient had further workup including a PET-CT with Dr. Burgess not reveal any distant metastatic disease.  She presented to our clinic on 01/23/2023 to establish care for further disease management.        Today, 02/16/23, denies any concerns today.  She does report burning/pain around the tumor site but denies any new foci of pain, decreased appetite, weight loss.  She reports mild nausea controlled with Zofran.  She denies any fevers, chills, drenching night sweats.        Patient denies any history of smoking, alcohol use or recreational drug use.  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  She has an ECOG of 0.  She has a mother and sister accompanied to her initial visit.  Family history of lung cancer in her grandfather and liver cancer in 1 of her uncles.  No history of breast cancer in the family.      Pathology  01/03/2023 right breast core biopsy and right axillary lymph node core biopsy:  Invasive ductal carcinoma, grade 3, right axillary lymph node biopsy with fragments of poorly differentiated malignant tumor consistent with ductal carcinoma.  Normal lymph node identified.  ER negative, [place] negative, HER2 IHC 0, Ki-67 85.3%.        Imaging     01/27/2022 echo EF 60%     01/10/2023 PET-CT:  Hypermetabolic mass in the superior right breast in keeping with the known cancer popliteal additional areas of mild-to-moderate uptake in the superior and retroareolar right breast are understood with definite discrete mass on CT but worrisome for infiltrative breast cancer.  Multiple hypermetabolic right  axillary subpectoral and right internal mammary lymph nodes consistent with metastatic disease.  No other suspicious uptake.  Right breast mass measures 3 by 2.8 cm with an SUV of 13.7.  Inferior to the mass there was mild diffuse uptake associated with non-mass like dense breast tissue with an SUV of 4.0.  There is also abnormal uptake in the retroareolar right breast with definitive discrete mass can not be  from the dense glandular tissue with an SUV of 5.7 and measuring 1.5 x 1.1 cm.  These are concerning for infiltrative malignancies.      Laboratory   02/15/23 Cr 0.83, alb 3.7, LFTs WNL, mag 1.7, phosphorus 2.0, TSH 0.4434, cortisol 10.6, wbc 3.32, hgb 11.3, plt 266, ANC 1.42 UPT negative   02/08/23: cr 0.79, alb 3.5, ca 9.3, AST 36, ALT 45, mag 1.9, phosphorus 2.1, wbc 4.47, hgb 11.1, plt 264, ANC 2.28  02/01/2023:  Creatinine 0.83, albumin 3.7, LFTs within normal limits, TSH 1.09, cortisol 8.8, free T4 0.98, WBC 7.7, Hb 11.5, MCV 87.9, platelet count 283, ANC 4.09, urine pregnancy negative.      Past Medical History:   Diagnosis Date    Breast cancer     GERD (gastroesophageal reflux disease)        Past Surgical History:   Procedure Laterality Date    ABLATION      MEDIPORT INSERTION, DOUBLE Left 01/26/2023    TUBAL LIGATION         Family History   Problem Relation Age of Onset    Diabetes Maternal Grandmother     Lung cancer Maternal Grandfather     Liver cancer Maternal Uncle        Social History     Socioeconomic History    Marital status: Single   Tobacco Use    Smoking status: Never    Smokeless tobacco: Never   Substance and Sexual Activity    Alcohol use: Yes     Comment: occasionally    Drug use: Never    Sexual activity: Yes     Partners: Male       Current Outpatient Medications   Medication Sig Dispense Refill    boric acid (PH-D) 600 mg vaginal suppository INSERT 1 SUPPOSITORY VAGINALLY DAILY AS NEEDED      dexAMETHasone (DECADRON) 4 MG Tab Take 5 tablets by mouth at 12 hours and 6  hours prior to chemotherapy. 10 tablet 5    LINZESS 290 mcg Cap capsule Take 290 mcg by mouth.      ondansetron (ZOFRAN) 8 MG tablet Take 1 tablet (8 mg total) by mouth every 8 (eight) hours as needed for Nausea. 30 tablet 1    promethazine (PHENERGAN) 12.5 MG Tab Take 1 tablet (12.5 mg total) by mouth every 4 (four) hours as needed. 60 tablet 1    traMADoL (ULTRAM) 50 mg tablet Take 50 mg by mouth every 4 (four) hours.       No current facility-administered medications for this visit.       Review of patient's allergies indicates:  No Known Allergies      CONSTITUTIONAL: no fevers, no chills, no weight loss, no fatigue, no weakness  HEMATOLOGIC: no abnormal bleeding, no abnormal bruising, no drenching night sweats  ONCOLOGIC: no new masses or lumps  HEENT: no vision loss, no tinnitus or hearing loss, no nose bleeding, no dysphagia, no odynophagia  CVS: no chest pain, no palpitations, no dyspnea on exertion  RESP: no shortness of breath, no hemoptysis, no cough  GI: no nausea, no vomiting, no diarrhea, no constipation, no melena, no hematochezia, no hematemesis, no abdominal pain, no increase in abdominal girth  : no dysuria, no hematuria, no hesitancy, no scrotal swelling, no discharge  INTEGUMENT: no rashes, no abnormal bruising, no nail pitting, no hyperpigmentation  BREAST:  Right breast pain  NEURO: no falls, no memory loss, no paresthesias or dysesthesias, no urofecal incontinence or retention, no loss of strength on any extremity  MSK: no back pain, no new joint pain, no joint swelling  PSYCH: no suicidal or homicidal ideation, no depression, no insomnia, no anhedonia  ENDOCRINE: no heat or cold intolerance, no polyuria, no polydipsia    Physical Exam:    Vitals:    02/16/23 0938   BP: 110/75   Pulse: 95   Resp: 18   Temp: 98.2 °F (36.8 °C)           ECOG PS 0  GA: AAOx3, NAD  HEENT: NCAT, PERRLA, EOMI, good dentition, moist oral mucous membranes  LYMPH: no cervical, axillary or supraclavicular  adenopathy  CVS: s1s2 RRR, no M/R/G  RESP: CTA b/l, no crackles, no wheezes or rhonchi  BREAST:  Deferred, done 9 days back.     ABD: soft, NT, ND, BS+, no hepatosplenomegaly  EXT: no deformities, no pedal edema  SKIN: no rashes, warm and dry  NEURO: normal mentation, strength 5/5 on all 4 extremities, no sensory deficits        Assessment and plan   # Right breast invasive ductal carcinoma, triple negative  ER negative, [place] negative, HER2 IHC 0, Ki-67 85%   [id number], stage IIIC   Discussed with patient the diagnosis of triple negative breast cancer, treatment recommendations including neoadjuvant chemotherapy followed by surgery followed by adjuvant chemotherapy + radiation therapy   Reviewed PET-CT without evidence of distant metastatic disease.  Will plan for treatment with neoadjuvant carbo Taxol pembrolizumab followed by AC pembrolizumab prior to surgical resection   Patient is scheduled for MRI bilateral breasts   Patient is not interested in fertility preservation  She is status post tubal ligation, discuss the need for dual contraception while on chemotherapy.  Baseline Echo 60%  S/p port placement and chemo ed  Genetic counseling done, test pending, to be drawn in clinic today.    Plan   Proceed with cycle 1 day 15 of carbo Taxol today  Plan to follow-up in clinic in 1 week with repeat labs prior to cycle 2 day 1 to assess treatment tolerance.        A total of 40  minutes were spent in review of records, interpretation of test, coordination of care, discussion and counseling with the patient.

## 2023-02-23 ENCOUNTER — OFFICE VISIT (OUTPATIENT)
Dept: HEMATOLOGY/ONCOLOGY | Facility: CLINIC | Age: 38
End: 2023-02-23
Payer: MEDICAID

## 2023-02-23 VITALS
SYSTOLIC BLOOD PRESSURE: 105 MMHG | DIASTOLIC BLOOD PRESSURE: 69 MMHG | RESPIRATION RATE: 18 BRPM | HEART RATE: 70 BPM | BODY MASS INDEX: 32.81 KG/M2 | HEIGHT: 66 IN | WEIGHT: 204.13 LBS | OXYGEN SATURATION: 100 % | TEMPERATURE: 99 F

## 2023-02-23 DIAGNOSIS — C50.411 MALIGNANT NEOPLASM OF UPPER-OUTER QUADRANT OF RIGHT BREAST IN FEMALE, ESTROGEN RECEPTOR NEGATIVE: Primary | ICD-10-CM

## 2023-02-23 DIAGNOSIS — Z17.1 MALIGNANT NEOPLASM OF UPPER-OUTER QUADRANT OF RIGHT BREAST IN FEMALE, ESTROGEN RECEPTOR NEGATIVE: Primary | ICD-10-CM

## 2023-02-23 PROCEDURE — 3074F PR MOST RECENT SYSTOLIC BLOOD PRESSURE < 130 MM HG: ICD-10-PCS | Mod: CPTII,,, | Performed by: NURSE PRACTITIONER

## 2023-02-23 PROCEDURE — 3078F DIAST BP <80 MM HG: CPT | Mod: CPTII,,, | Performed by: NURSE PRACTITIONER

## 2023-02-23 PROCEDURE — 3078F PR MOST RECENT DIASTOLIC BLOOD PRESSURE < 80 MM HG: ICD-10-PCS | Mod: CPTII,,, | Performed by: NURSE PRACTITIONER

## 2023-02-23 PROCEDURE — 99215 PR OFFICE/OUTPT VISIT, EST, LEVL V, 40-54 MIN: ICD-10-PCS | Mod: ,,, | Performed by: NURSE PRACTITIONER

## 2023-02-23 PROCEDURE — 3074F SYST BP LT 130 MM HG: CPT | Mod: CPTII,,, | Performed by: NURSE PRACTITIONER

## 2023-02-23 PROCEDURE — 1159F PR MEDICATION LIST DOCUMENTED IN MEDICAL RECORD: ICD-10-PCS | Mod: CPTII,,, | Performed by: NURSE PRACTITIONER

## 2023-02-23 PROCEDURE — 3008F BODY MASS INDEX DOCD: CPT | Mod: CPTII,,, | Performed by: NURSE PRACTITIONER

## 2023-02-23 PROCEDURE — 99215 OFFICE O/P EST HI 40 MIN: CPT | Mod: ,,, | Performed by: NURSE PRACTITIONER

## 2023-02-23 PROCEDURE — 3008F PR BODY MASS INDEX (BMI) DOCUMENTED: ICD-10-PCS | Mod: CPTII,,, | Performed by: NURSE PRACTITIONER

## 2023-02-23 PROCEDURE — 1159F MED LIST DOCD IN RCRD: CPT | Mod: CPTII,,, | Performed by: NURSE PRACTITIONER

## 2023-02-23 RX ORDER — DIPHENHYDRAMINE HYDROCHLORIDE 50 MG/ML
50 INJECTION INTRAMUSCULAR; INTRAVENOUS ONCE AS NEEDED
Status: CANCELLED | OUTPATIENT
Start: 2023-02-23

## 2023-02-23 RX ORDER — EPINEPHRINE 0.3 MG/.3ML
0.3 INJECTION SUBCUTANEOUS ONCE AS NEEDED
Status: CANCELLED | OUTPATIENT
Start: 2023-02-23

## 2023-02-23 RX ORDER — SODIUM CHLORIDE 0.9 % (FLUSH) 0.9 %
10 SYRINGE (ML) INJECTION
Status: CANCELLED | OUTPATIENT
Start: 2023-02-23

## 2023-02-23 RX ORDER — HEPARIN 100 UNIT/ML
500 SYRINGE INTRAVENOUS
Status: CANCELLED | OUTPATIENT
Start: 2023-02-23

## 2023-02-23 RX ORDER — FAMOTIDINE 10 MG/ML
20 INJECTION INTRAVENOUS
Status: CANCELLED | OUTPATIENT
Start: 2023-02-23

## 2023-02-23 NOTE — PROGRESS NOTES
Referring provider:  Dr. Burgess  Reason for consultation:  Triple negative right breast cancer     Patient is a 37-year-old with no significant medical history who noticed a mass right breast in November 2022 which led to further workup including mammogram, ultrasound and biopsy, pathology from biopsy in January 2023 revealed triple negative breast cancer, grade 3.  Patient had further workup including a PET-CT with Dr. Burgess not reveal any distant metastatic disease.  She presented to our clinic on 01/23/2023 to establish care for further disease management.      Interval History:   Today, 2/23/2023: Patient presents to clinic today for follow up and treatment visit.   Main complaint today is headaches and insomnia.  Headaches, onset with treatment, unchanged.  Headache throughout the day every day. Achy and constant. Frontal region.   No sinus symptoms or ear pain.   Will go away during treatment after being given benadryl.  State drinks plenty of water 6-8 bottles per day.   Insomnia presented when she was told that she had cancer.   Tried Melatonin 2 weeks ago, no relief.   She did not take her steroid this morning, as she thinks it may be causing headaches. She denies any headache today.   She denies any symptoms of fatigue, fever, infection, rashes, itching, cough, nausea, joint or muscle pain, numbness, abdominal pain, diarrhea or mouth sores.     Social history:   Patient denies any history of smoking, alcohol use or recreational drug use.  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  She has an ECOG of 0.  She has a mother and sister accompanied to her initial visit.  Family history of lung cancer in her grandfather and liver cancer in 1 of her uncles.  No history of breast cancer in the family.    Pathology  01/03/2023 right breast core biopsy and right axillary lymph node core biopsy:  Invasive ductal carcinoma, grade 3, right axillary lymph node biopsy  with fragments of poorly differentiated malignant tumor consistent with ductal carcinoma.  Normal lymph node identified.  ER negative, PA negative, HER2 IHC 0, Ki-67 85.3%.    Imagin2022 echo EF 60%     01/10/2023 PET-CT:  Hypermetabolic mass in the superior right breast in keeping with the known cancer popliteal additional areas of mild-to-moderate uptake in the superior and retroareolar right breast are understood with definite discrete mass on CT but worrisome for infiltrative breast cancer.  Multiple hypermetabolic right axillary subpectoral and right internal mammary lymph nodes consistent with metastatic disease.  No other suspicious uptake.  Right breast mass measures 3 by 2.8 cm with an SUV of 13.7.  Inferior to the mass there was mild diffuse uptake associated with non-mass like dense breast tissue with an SUV of 4.0.  There is also abnormal uptake in the retroareolar right breast with definitive discrete mass can not be  from the dense glandular tissue with an SUV of 5.7 and measuring 1.5 x 1.1 cm.  These are concerning for infiltrative malignancies.    Laboratory   2023: WBC 3.02, Hgb 10.9, MCV 88.0, , ANC 1.4 3, UPT negative, creatinine 0.82 albumin 3.7 magnesium 2.1 phosphorus 2.1, TSH 0.6540, cortisol 8.7  02/15/23 Cr 0.83, alb 3.7, LFTs WNL, mag 1.7, phosphorus 2.0, TSH 0.4434, cortisol 10.6, wbc 3.32, hgb 11.3, plt 266, ANC 1.42 UPT negative   23: cr 0.79, alb 3.5, ca 9.3, AST 36, ALT 45, mag 1.9, phosphorus 2.1, wbc 4.47, hgb 11.1, plt 264, ANC 2.28  2023:  Creatinine 0.83, albumin 3.7, LFTs within normal limits, TSH 1.09, cortisol 8.8, free T4 0.98, WBC 7.7, Hb 11.5, MCV 87.9, platelet count 283, ANC 4.09, urine pregnancy negative.    Past Medical History:   Diagnosis Date    Breast cancer     GERD (gastroesophageal reflux disease)      Past Surgical History:   Procedure Laterality Date    ABLATION      MEDIPORT INSERTION, DOUBLE Left 2023    TUBAL  LIGATION         Family History   Problem Relation Age of Onset    Diabetes Maternal Grandmother     Lung cancer Maternal Grandfather     Liver cancer Maternal Uncle        Social History     Socioeconomic History    Marital status: Single   Tobacco Use    Smoking status: Never    Smokeless tobacco: Never   Substance and Sexual Activity    Alcohol use: Yes     Comment: occasionally    Drug use: Never    Sexual activity: Yes     Partners: Male       Current Outpatient Medications   Medication Sig Dispense Refill    boric acid (PH-D) 600 mg vaginal suppository INSERT 1 SUPPOSITORY VAGINALLY DAILY AS NEEDED      dexAMETHasone (DECADRON) 4 MG Tab Take 5 tablets by mouth at 12 hours and 6 hours prior to chemotherapy. 10 tablet 5    LINZESS 290 mcg Cap capsule Take 290 mcg by mouth.      ondansetron (ZOFRAN) 8 MG tablet Take 1 tablet (8 mg total) by mouth every 8 (eight) hours as needed for Nausea. 30 tablet 1    promethazine (PHENERGAN) 12.5 MG Tab Take 1 tablet (12.5 mg total) by mouth every 4 (four) hours as needed. 60 tablet 1    traMADoL (ULTRAM) 50 mg tablet Take 50 mg by mouth every 4 (four) hours.       No current facility-administered medications for this visit.     Review of patient's allergies indicates:  No Known Allergies    CONSTITUTIONAL: no fevers, no chills, no weight loss, no fatigue, no weakness  HEMATOLOGIC: no abnormal bleeding, no abnormal bruising, no drenching night sweats  ONCOLOGIC: no new masses or lumps  HEENT: no vision loss, no tinnitus or hearing loss, no nose bleeding, no dysphagia, no odynophagia  CVS: no chest pain, no palpitations, no dyspnea on exertion  RESP: no shortness of breath, no hemoptysis, no cough  GI: no nausea, no vomiting, no diarrhea, no constipation, no melena, no hematochezia, no hematemesis, no abdominal pain, no increase in abdominal girth  : no dysuria, no hematuria, no hesitancy, no scrotal swelling, no discharge  INTEGUMENT: no rashes, no abnormal bruising, no  "nail pitting, no hyperpigmentation  BREAST:  Right breast pain  NEURO: + headache. no falls, no memory loss, no paresthesias or dysesthesias, no urofecal incontinence or retention, no loss of strength on any extremity  MSK: no back pain, no new joint pain, no joint swelling  PSYCH: no suicidal or homicidal ideation, no depression, + insomnia, no anhedonia  ENDOCRINE: no heat or cold intolerance, no polyuria, no polydipsia    Physical Exam:  Vitals: Blood pressure 105/69, pulse 70, temperature 98.5 °F (36.9 °C), temperature source Oral, resp. rate 18, height 5' 6" (1.676 m), weight 92.6 kg (204 lb 1.6 oz), last menstrual period 02/02/2023, SpO2 100 %.    ECOG PS 0  GA: AAOx3, NAD  HEENT: NCAT, no tenderness to head with palpation.  PERRL, EOMI, good dentition, moist oral mucous membranes. One superficial oral ulcer mid region of upper gum line. No tenderness per patient report.   LYMPH: no cervical, axillary or supraclavicular adenopathy  CVS: s1s2 RRR, no M/R/G  RESP: CTA b/l, no crackles, no wheezes or rhonchi  BREAST:  Deferred, done 9 days back.     ABD: soft, NT, ND, BS+, no hepatosplenomegaly  EXT: no deformities, no pedal edema  SKIN: no rashes, warm and dry  NEURO: normal mentation, strength 5/5 on all 4 extremities, no sensory deficits    Assessment and plan:   # Right breast invasive ductal carcinoma, triple negative  ER negative, MT negative, HER2 IHC 0, Ki-67 85%   [id number], stage IIIC   Discussed with patient the diagnosis of triple negative breast cancer, treatment recommendations including neoadjuvant chemotherapy followed by surgery followed by adjuvant chemotherapy + radiation therapy   Reviewed PET-CT without evidence of distant metastatic disease.  Will plan for treatment with neoadjuvant carbo Taxol pembrolizumab followed by AC pembrolizumab prior to surgical resection   Patient is scheduled for MRI bilateral breasts   Patient is not interested in fertility preservation  She is status post tubal " ligation, discuss the need for dual contraception while on chemotherapy.  Baseline Echo 60%  S/p port placement and chemo ed  Genetic counseling done, test pending, to be drawn in clinic today.    Encounter for antineoplastic chemotherapy/immunotherapy. Patient is reporting headaches and insomnia. Onset with treatment.   Advised her both are most likely secondary to steroid use. She did not take steroid this morning.   Advised her in the future to call and discuss with us prior to stopping medication. Will see how she does this coming week.   May take Aleve for headache, Aleve-pm to help with sleep.   Sleep hygiene discussed. Literature on sleep hygiene provided.     Mild elevation ALT - she has been using Tylenol 4 pills every day for past week to help with headaches. Could be contributing factor. Have asked her to stop. Recheck LFTs next appointment.     Plan   Proceed with cycle 2 day 1 of carbo, Keytruda and Taxol today  Plan to follow-up in clinic in 1 week with repeat labs prior to cycle 2 day 8 to assess treatment tolerance.  She will call office in the interim for any worsening symptoms, concerns or questions.       Hortencia Hoffman, NP-C    Answers submitted by the patient for this visit:  Review of Systems Questionnaire (Submitted on 2/23/2023)  appetite change : No  unexpected weight change: No  mouth sores: No  visual disturbance: No  cough: No  shortness of breath: No  chest pain: No  abdominal pain: No  diarrhea: No  frequency: No  back pain: No  rash: No  headaches: Yes  adenopathy: No  nervous/ anxious: No

## 2023-03-02 ENCOUNTER — OFFICE VISIT (OUTPATIENT)
Dept: HEMATOLOGY/ONCOLOGY | Facility: CLINIC | Age: 38
End: 2023-03-02
Payer: MEDICAID

## 2023-03-02 VITALS
DIASTOLIC BLOOD PRESSURE: 86 MMHG | OXYGEN SATURATION: 99 % | WEIGHT: 206.19 LBS | TEMPERATURE: 98 F | BODY MASS INDEX: 33.14 KG/M2 | HEIGHT: 66 IN | SYSTOLIC BLOOD PRESSURE: 123 MMHG | HEART RATE: 84 BPM | RESPIRATION RATE: 18 BRPM

## 2023-03-02 DIAGNOSIS — Z51.11 ENCOUNTER FOR ANTINEOPLASTIC CHEMOTHERAPY: ICD-10-CM

## 2023-03-02 DIAGNOSIS — R74.01 TRANSAMINITIS: ICD-10-CM

## 2023-03-02 DIAGNOSIS — C50.411 MALIGNANT NEOPLASM OF UPPER-OUTER QUADRANT OF RIGHT BREAST IN FEMALE, ESTROGEN RECEPTOR NEGATIVE: Primary | ICD-10-CM

## 2023-03-02 DIAGNOSIS — Z17.1 MALIGNANT NEOPLASM OF UPPER-OUTER QUADRANT OF RIGHT BREAST IN FEMALE, ESTROGEN RECEPTOR NEGATIVE: Primary | ICD-10-CM

## 2023-03-02 PROCEDURE — 3008F PR BODY MASS INDEX (BMI) DOCUMENTED: ICD-10-PCS | Mod: CPTII,,, | Performed by: STUDENT IN AN ORGANIZED HEALTH CARE EDUCATION/TRAINING PROGRAM

## 2023-03-02 PROCEDURE — 3074F SYST BP LT 130 MM HG: CPT | Mod: CPTII,,, | Performed by: STUDENT IN AN ORGANIZED HEALTH CARE EDUCATION/TRAINING PROGRAM

## 2023-03-02 PROCEDURE — 1159F MED LIST DOCD IN RCRD: CPT | Mod: CPTII,,, | Performed by: STUDENT IN AN ORGANIZED HEALTH CARE EDUCATION/TRAINING PROGRAM

## 2023-03-02 PROCEDURE — 3079F PR MOST RECENT DIASTOLIC BLOOD PRESSURE 80-89 MM HG: ICD-10-PCS | Mod: CPTII,,, | Performed by: STUDENT IN AN ORGANIZED HEALTH CARE EDUCATION/TRAINING PROGRAM

## 2023-03-02 PROCEDURE — 1159F PR MEDICATION LIST DOCUMENTED IN MEDICAL RECORD: ICD-10-PCS | Mod: CPTII,,, | Performed by: STUDENT IN AN ORGANIZED HEALTH CARE EDUCATION/TRAINING PROGRAM

## 2023-03-02 PROCEDURE — 99215 OFFICE O/P EST HI 40 MIN: CPT | Mod: ,,, | Performed by: STUDENT IN AN ORGANIZED HEALTH CARE EDUCATION/TRAINING PROGRAM

## 2023-03-02 PROCEDURE — 3074F PR MOST RECENT SYSTOLIC BLOOD PRESSURE < 130 MM HG: ICD-10-PCS | Mod: CPTII,,, | Performed by: STUDENT IN AN ORGANIZED HEALTH CARE EDUCATION/TRAINING PROGRAM

## 2023-03-02 PROCEDURE — 99215 PR OFFICE/OUTPT VISIT, EST, LEVL V, 40-54 MIN: ICD-10-PCS | Mod: ,,, | Performed by: STUDENT IN AN ORGANIZED HEALTH CARE EDUCATION/TRAINING PROGRAM

## 2023-03-02 PROCEDURE — 3079F DIAST BP 80-89 MM HG: CPT | Mod: CPTII,,, | Performed by: STUDENT IN AN ORGANIZED HEALTH CARE EDUCATION/TRAINING PROGRAM

## 2023-03-02 PROCEDURE — 3008F BODY MASS INDEX DOCD: CPT | Mod: CPTII,,, | Performed by: STUDENT IN AN ORGANIZED HEALTH CARE EDUCATION/TRAINING PROGRAM

## 2023-03-02 NOTE — PROGRESS NOTES
Referring provider:  Dr. Burgess  Reason for consultation:  Triple negative right breast cancer     Patient is a 37-year-old with no significant medical history who noticed a mass right breast in 2022 which led to further workup including mammogram, ultrasound and biopsy, pathology from biopsy in 2023 revealed triple negative breast cancer, grade 3.  Patient had further workup including a PET-CT with Dr. Burgess not reveal any distant metastatic disease.  She presented to our clinic on 2023 to establish care for further disease management.      Interval History:   Today, 2023, patient reports symptoms of intermittent headaches and attributes it to steroid use prior to her chemotherapy.  She reports constipation relieved with milk of magnesium.  She denies any tingling numbness, fevers, chills, decreased appetite.  She reports subjective decrease in breasts mass size.  She denies any new lumps or bumps.    Social history:   Patient denies any history of smoking, alcohol use or recreational drug use.  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  She has an ECOG of 0.  She has a mother and sister accompanied to her initial visit.  Family history of lung cancer in her grandfather and liver cancer in 1 of her uncles.  No history of breast cancer in the family.    Pathology  2023 right breast core biopsy and right axillary lymph node core biopsy:  Invasive ductal carcinoma, grade 3, right axillary lymph node biopsy with fragments of poorly differentiated malignant tumor consistent with ductal carcinoma.  Normal lymph node identified.  ER negative, [place] negative, HER2 IHC 0, Ki-67 85.3%.    Imagin2022 echo EF 60%     01/10/2023 PET-CT:  Hypermetabolic mass in the superior right breast in keeping with the known cancer popliteal additional areas of mild-to-moderate uptake in the superior and retroareolar right breast are understood  with definite discrete mass on CT but worrisome for infiltrative breast cancer.  Multiple hypermetabolic right axillary subpectoral and right internal mammary lymph nodes consistent with metastatic disease.  No other suspicious uptake.  Right breast mass measures 3 by 2.8 cm with an SUV of 13.7.  Inferior to the mass there was mild diffuse uptake associated with non-mass like dense breast tissue with an SUV of 4.0.  There is also abnormal uptake in the retroareolar right breast with definitive discrete mass can not be  from the dense glandular tissue with an SUV of 5.7 and measuring 1.5 x 1.1 cm.  These are concerning for infiltrative malignancies.    Laboratory   03/02/2023:  WBC count 5.17, hemoglobin 10.2, MCV 91.1, platelet count 265, ANC 1.89, urine pregnancy negative, creatinine 0.9, albu3.5, ca 9.2, alkaline phosphatase 76, total bilirubin 0.3, AST 53, , magnesium 1.8, phosphorus 2.7, TSH 0.9.  02/22/2023: WBC 3.02, Hgb 10.9, MCV 88.0, , ANC 1.4 3, UPT negative, creatinine 0.82 albumin 3.7 magnesium 2.1 phosphorus 2.1, TSH 0.6540, cortisol 8.7  02/15/23 Cr 0.83, alb 3.7, LFTs WNL, mag 1.7, phosphorus 2.0, TSH 0.4434, cortisol 10.6, wbc 3.32, hgb 11.3, plt 266, ANC 1.42 UPT negative   02/08/23: cr 0.79, alb 3.5, ca 9.3, AST 36, ALT 45, mag 1.9, phosphorus 2.1, wbc 4.47, hgb 11.1, plt 264, ANC 2.28  02/01/2023:  Creatinine 0.83, albumin 3.7, LFTs within normal limits, TSH 1.09, cortisol 8.8, free T4 0.98, WBC 7.7, Hb 11.5, MCV 87.9, platelet count 283, ANC 4.09, urine pregnancy negative.    Past Medical History:   Diagnosis Date    Breast cancer     GERD (gastroesophageal reflux disease)      Past Surgical History:   Procedure Laterality Date    ABLATION      MEDIPORT INSERTION, DOUBLE Left 01/26/2023    TUBAL LIGATION         Family History   Problem Relation Age of Onset    Diabetes Maternal Grandmother     Lung cancer Maternal Grandfather     Liver cancer Maternal Uncle        Social  History     Socioeconomic History    Marital status: Single   Tobacco Use    Smoking status: Never    Smokeless tobacco: Never   Substance and Sexual Activity    Alcohol use: Yes     Comment: occasionally    Drug use: Never    Sexual activity: Yes     Partners: Male       Current Outpatient Medications   Medication Sig Dispense Refill    boric acid (PH-D) 600 mg vaginal suppository INSERT 1 SUPPOSITORY VAGINALLY DAILY AS NEEDED      dexAMETHasone (DECADRON) 4 MG Tab Take 5 tablets by mouth at 12 hours and 6 hours prior to chemotherapy. 10 tablet 5    LINZESS 290 mcg Cap capsule Take 290 mcg by mouth.      ondansetron (ZOFRAN) 8 MG tablet Take 1 tablet (8 mg total) by mouth every 8 (eight) hours as needed for Nausea. 30 tablet 1    promethazine (PHENERGAN) 12.5 MG Tab Take 1 tablet (12.5 mg total) by mouth every 4 (four) hours as needed. 60 tablet 1    traMADoL (ULTRAM) 50 mg tablet Take 50 mg by mouth every 4 (four) hours.       No current facility-administered medications for this visit.     Review of patient's allergies indicates:  No Known Allergies    CONSTITUTIONAL: no fevers, no chills, no weight loss, no fatigue, no weakness  HEMATOLOGIC: no abnormal bleeding, no abnormal bruising, no drenching night sweats  ONCOLOGIC: no new masses or lumps  HEENT: no vision loss, no tinnitus or hearing loss, no nose bleeding, no dysphagia, no odynophagia  CVS: no chest pain, no palpitations, no dyspnea on exertion  RESP: no shortness of breath, no hemoptysis, no cough  GI: no nausea, no vomiting, no diarrhea, no constipation, no melena, no hematochezia, no hematemesis, no abdominal pain, no increase in abdominal girth  : no dysuria, no hematuria, no hesitancy, no scrotal swelling, no discharge  INTEGUMENT: no rashes, no abnormal bruising, no nail pitting, no hyperpigmentation  BREAST:  Right breast pain  NEURO: + headache. no falls, no memory loss, no paresthesias or dysesthesias, no urofecal incontinence or retention, no  "loss of strength on any extremity  MSK: no back pain, no new joint pain, no joint swelling  PSYCH: no suicidal or homicidal ideation, no depression, + insomnia, no anhedonia  ENDOCRINE: no heat or cold intolerance, no polyuria, no polydipsia    Physical Exam:  Vitals: Blood pressure 123/86, pulse 84, temperature 98.1 °F (36.7 °C), temperature source Oral, resp. rate 18, height 5' 6" (1.676 m), weight 93.5 kg (206 lb 3.2 oz), SpO2 99 %.    ECOG PS 0  GA: AAOx3, NAD  HEENT: NCAT, no tenderness to head with palpation.  PERRL, EOMI, good dentition, moist oral mucous membranes. One superficial oral ulcer mid region of upper gum line. No tenderness per patient report.   LYMPH: no cervical, axillary or supraclavicular adenopathy  CVS: s1s2 RRR, no M/R/G  RESP: CTA b/l, no crackles, no wheezes or rhonchi  BREAST:  Right breast without evidence of an obvious mass, nodularity in the upper outer quadrant felt on deep palpation, with vague borders.  No adenopathy appreciated on my exam  ABD: soft, NT, ND, BS+, no hepatosplenomegaly  EXT: no deformities, no pedal edema  SKIN: no rashes, warm and dry  NEURO: normal mentation, strength 5/5 on all 4 extremities, no sensory deficits    Assessment and plan:     # Right breast invasive ductal carcinoma, triple negative  ER negative, TN negative, HER2 IHC 0, Ki-67 85%   [id number], stage IIIC   Discussed with patient the diagnosis of triple negative breast cancer, treatment recommendations including neoadjuvant chemotherapy followed by surgery followed by adjuvant chemotherapy + radiation therapy   Reviewed PET-CT without evidence of distant metastatic disease.  Will plan for treatment with neoadjuvant carbo Taxol pembrolizumab followed by AC pembrolizumab prior to surgical resection   S/p MRI bilateral breasts   Patient is not interested in fertility preservation  She is status post tubal ligation, discuss the need for dual contraception while on chemotherapy.  Baseline Echo 60%  S/p " port placement and chemo ed  Genetic counseling done, test results pending      Plan   Proceed with cycle 2 day 18of carbo and Taxol today  Ultrasound abdomen to evaluate transaminitis.  Patient does report using colon cleanse pills as well as drinking 1 drink of alcohol and using Tylenol recently.  Patient was advised to avoid using any over-the-counter herbs, supplements including colon  cleanse spells.  Avoid Tylenol and avoid alcohol use given transaminitis  Plan to follow-up in clinic in 1 week with repeat labs prior to cycle 2 day 15 to assess treatment tolerance.  Okay to reduce dexamethasone to 20 milligrams 12 hours prior to chemotherapy given patient's adverse effects of headaches and anxiety.  She will call office in the interim for any worsening symptoms, concerns or questions.         Portions of the record may have been created with voice recognition software. Occasional wrong-word or sound-a-like substitutions may have occurred due to the inherent limitations of voice recognition software. Read the chart carefully and recognize, using context, where substitutions have occurred.

## 2023-03-07 ENCOUNTER — OFFICE VISIT (OUTPATIENT)
Dept: HEMATOLOGY/ONCOLOGY | Facility: CLINIC | Age: 38
End: 2023-03-07
Payer: MEDICAID

## 2023-03-07 ENCOUNTER — HOSPITAL ENCOUNTER (OUTPATIENT)
Dept: RADIOLOGY | Facility: HOSPITAL | Age: 38
Discharge: HOME | End: 2023-03-07
Attending: STUDENT IN AN ORGANIZED HEALTH CARE EDUCATION/TRAINING PROGRAM
Payer: MEDICAID

## 2023-03-07 ENCOUNTER — TELEPHONE (OUTPATIENT)
Dept: HEMATOLOGY/ONCOLOGY | Facility: CLINIC | Age: 38
End: 2023-03-07
Payer: MEDICAID

## 2023-03-07 DIAGNOSIS — Z51.11 ENCOUNTER FOR ANTINEOPLASTIC CHEMOTHERAPY: ICD-10-CM

## 2023-03-07 DIAGNOSIS — R74.01 TRANSAMINITIS: ICD-10-CM

## 2023-03-07 DIAGNOSIS — Z17.1 MALIGNANT NEOPLASM OF UPPER-OUTER QUADRANT OF RIGHT BREAST IN FEMALE, ESTROGEN RECEPTOR NEGATIVE: Primary | ICD-10-CM

## 2023-03-07 DIAGNOSIS — Z17.1 MALIGNANT NEOPLASM OF UPPER-OUTER QUADRANT OF RIGHT BREAST IN FEMALE, ESTROGEN RECEPTOR NEGATIVE: ICD-10-CM

## 2023-03-07 DIAGNOSIS — C50.411 MALIGNANT NEOPLASM OF UPPER-OUTER QUADRANT OF RIGHT BREAST IN FEMALE, ESTROGEN RECEPTOR NEGATIVE: ICD-10-CM

## 2023-03-07 DIAGNOSIS — C50.411 MALIGNANT NEOPLASM OF UPPER-OUTER QUADRANT OF RIGHT BREAST IN FEMALE, ESTROGEN RECEPTOR NEGATIVE: Primary | ICD-10-CM

## 2023-03-07 PROCEDURE — 76705 ECHO EXAM OF ABDOMEN: CPT | Mod: TC

## 2023-03-07 PROCEDURE — 99213 PR OFFICE/OUTPT VISIT, EST, LEVL III, 20-29 MIN: ICD-10-PCS | Mod: 95,,, | Performed by: NURSE PRACTITIONER

## 2023-03-07 PROCEDURE — 99213 OFFICE O/P EST LOW 20 MIN: CPT | Mod: 95,,, | Performed by: NURSE PRACTITIONER

## 2023-03-07 NOTE — TELEPHONE ENCOUNTER
Pt c/o itching to arms and back for the past four days. Reports taking Benadryl with some relief. Denies rash or fever.--SC,LPN

## 2023-03-07 NOTE — PROGRESS NOTES
REFERRING PHYSICIAN: Dr. Italo Nelson    Subjective:       Patient ID: Thom Cordoba is a 37 y.o. female.    Chief Complaint: Personal history of recently diagnosed triple negative breast cancer (right IDC) and a family history of cancer. Patient presented for genetic counseling on 1/31/2023 and was found appropriate for genetic testing based on the National Comprehensive Cancer Network (NCCN) criteria due to a personal history of triple negative breast cancer diagnosed under 60y (dx37y) and an unknown paternal family history  (see family history and pedigree). She signed consent, lab was drawn and sent to [facility] for BRCA1/2 Analysis with myRisk panel testing. She presented via Telemedicine Visit (audio and video) today for results disclosure.     HPI    Past Medical History:   Diagnosis Date    Breast cancer     GERD (gastroesophageal reflux disease)         Past Surgical History:   Procedure Laterality Date    ABLATION      MEDIPORT INSERTION, DOUBLE Left 01/26/2023    TUBAL LIGATION          Patient has no known allergies.     Review of Systems                Problem List Items Addressed This Visit    None      Oncology History   Malignant neoplasm of upper-outer quadrant of right breast in female, estrogen receptor negative   1/23/2023 Initial Diagnosis    Malignant neoplasm of upper-outer quadrant of right breast in female, estrogen receptor negative     1/23/2023 Cancer Staged    Staging form: Breast, AJCC 8th Edition  - Clinical: Stage IIIC (cT3, cN3, cM0, G3, ER-, FL-, HER2-)       2/2/2023 -  Chemotherapy    Treatment Summary   Plan Name: OP PEMBROLIZUMAB WITH WEEKLY PACLITAXEL CARBOPLATIN (AUC 1.5) FOLLOWED BY PEMBROLIZUMAB DOXORUBICIN CYCLOPHOSPHAMIDE FOLLOWED BY PEMBROLIZUMAB 200MG Q3W  Treatment Goal: Curative  Status: Active  Start Date: 2/2/2023  End Date: 1/4/2024 (Planned)  Provider: Italo Nelson MD  Chemotherapy: DOXOrubicin chemo injection 126 mg, 60 mg/m2, Intravenous,  Clinic/HOD 1 time, 0 of 4 cycles  CARBOplatin (PARAPLATIN) 225 mg in sodium chloride 0.9% 272.5 mL chemo infusion, 225 mg, Intravenous, Clinic/HOD 1 time, 2 of 4 cycles  cyclophosphamide 600 mg/m2 = 1,260 mg in sodium chloride 0.9% 256.3 mL chemo infusion, 600 mg/m2, Intravenous, Clinic/HOD 1 time, 0 of 4 cycles  PACLitaxeL (TAXOL) 80 mg/m2 = 168 mg in sodium chloride 0.9% 250 mL chemo infusion, 80 mg/m2 = 168 mg, Intravenous, Clinic/HOD 1 time, 2 of 4 cycles                 Family History   Problem Relation Age of Onset    Diabetes Maternal Grandmother     Lung cancer Maternal Grandfather     Liver cancer Maternal Uncle         Assessment:     Genetic testing was appropriate for this patient because of her personal and family history. This comprehensive myRisk analysis indicated that there was no deleterious mutation found in APC, SUHA, AXIN2, BAP1, BARD1, BMPR1A, BRCA1, BRCA2, BRIP1, CDH1, CDK4, CDKN2A, CHEK2, CTNNA1, FH, FLCN, HOXB13 (seq only), MEN1, MET, MLH1, MSH2, MSH3 (excluding repetitive portions of exon 1), MSH6, MUTYH, NTHL1, PALB2, PMS2, PTEN, RAD51C, RAD51D, SDHA, SDHB, SDHC, SDHD, SMAD4, STK11, TP53, TSC1, TSC2, VHL.. Analysis consisted of sequencing and large rearrangement analyses performed on these genes. It was explained to the patient, that, although this analysis indicated a negative result, there are other, rare genetic abnormalities that this test will not detect. This result, however, rules out the majority of abnormalities believed to be responsible for hereditary susceptibility to cancer.    A copy of her result will be emailed to [email or web address]     The patient location is: home    Visit type: audiovisual    Face to Face time with patient: 10 minutes  20 minutes of total time spent on the encounter, which includes face to face time and non-face to face time preparing to see the patient (eg, review of tests), Obtaining and/or reviewing separately obtained history, Documenting clinical  information in the electronic or other health record, Independently interpreting results (not separately reported) and communicating results to the patient/family/caregiver, or Care coordination (not separately reported).         Each patient to whom he or she provides medical services by telemedicine is:  (1) informed of the relationship between the physician and patient and the respective role of any other health care provider with respect to management of the patient; and (2) notified that he or she may decline to receive medical services by telemedicine and may withdraw from such care at any time.    ALY URRUTIA, PhD

## 2023-03-08 NOTE — PROGRESS NOTES
Referring provider:  Dr. Burgess  Reason for consultation:  Triple negative right breast cancer     Patient is a 37-year-old with no significant medical history who noticed a mass right breast in 2022 which led to further workup including mammogram, ultrasound and biopsy, pathology from biopsy in 2023 revealed triple negative breast cancer, grade 3.  Patient had further workup including a PET-CT with Dr. Burgess not reveal any distant metastatic disease.  She presented to our clinic on 2023 to establish care for further disease management.      Interval History:   Today, patient reports symptoms of intermittent itching relieved with benadryl over the weekend. Denies any chest pain, shortness of breath or rash. Denies any tingling numbness, fevers, chills, decreased appetite.      Social history:   Patient denies any history of smoking, alcohol use or recreational drug use.  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  She has an ECOG of 0.  She has a mother and sister accompanied to her initial visit.  Family history of lung cancer in her grandfather and liver cancer in 1 of her uncles.  No history of breast cancer in the family.      LABS / IMAGING        Pathology  2023 right breast core biopsy and right axillary lymph node core biopsy:  Invasive ductal carcinoma, grade 3, right axillary lymph node biopsy with fragments of poorly differentiated malignant tumor consistent with ductal carcinoma.  Normal lymph node identified.  ER negative, TX negative, HER2 IHC 0, Ki-67 85.3%.    Imagin2022 echo EF 60%     01/10/2023 PET-CT:  Hypermetabolic mass in the superior right breast in keeping with the known cancer popliteal additional areas of mild-to-moderate uptake in the superior and retroareolar right breast are understood with definite discrete mass on CT but worrisome for infiltrative breast cancer.  Multiple hypermetabolic right  axillary subpectoral and right internal mammary lymph nodes consistent with metastatic disease.  No other suspicious uptake.  Right breast mass measures 3 by 2.8 cm with an SUV of 13.7.  Inferior to the mass there was mild diffuse uptake associated with non-mass like dense breast tissue with an SUV of 4.0.  There is also abnormal uptake in the retroareolar right breast with definitive discrete mass can not be  from the dense glandular tissue with an SUV of 5.7 and measuring 1.5 x 1.1 cm.  These are concerning for infiltrative malignancies.    Laboratory   03/08/2023:  Creatinine 0.83, AST 73, , bilirubin 0.5, TSH 0.75, WBC 5.7, hemoglobin 11, platelets 222  03/02/2023:  WBC count 5.17, hemoglobin 10.2, MCV 91.1, platelet count 265, ANC 1.89, urine pregnancy negative, creatinine 0.9, albu3.5, ca 9.2, alkaline phosphatase 76, total bilirubin 0.3, AST 53, , magnesium 1.8, phosphorus 2.7, TSH 0.9.  02/22/2023: WBC 3.02, Hgb 10.9, MCV 88.0, , ANC 1.4 3, UPT negative, creatinine 0.82 albumin 3.7 magnesium 2.1 phosphorus 2.1, TSH 0.6540, cortisol 8.7  02/15/23 Cr 0.83, alb 3.7, LFTs WNL, mag 1.7, phosphorus 2.0, TSH 0.4434, cortisol 10.6, wbc 3.32, hgb 11.3, plt 266, ANC 1.42 UPT negative   02/08/23: cr 0.79, alb 3.5, ca 9.3, AST 36, ALT 45, mag 1.9, phosphorus 2.1, wbc 4.47, hgb 11.1, plt 264, ANC 2.28  02/01/2023:  Creatinine 0.83, albumin 3.7, LFTs within normal limits, TSH 1.09, cortisol 8.8, free T4 0.98, WBC 7.7, Hb 11.5, MCV 87.9, platelet count 283, ANC 4.09, urine pregnancy negative.    Past Medical History:   Diagnosis Date    Breast cancer     GERD (gastroesophageal reflux disease)      Past Surgical History:   Procedure Laterality Date    ABLATION      MEDIPORT INSERTION, DOUBLE Left 01/26/2023    TUBAL LIGATION         Family History   Problem Relation Age of Onset    Diabetes Maternal Grandmother     Lung cancer Maternal Grandfather     Liver cancer Maternal Uncle        Social  "History     Socioeconomic History    Marital status: Single   Tobacco Use    Smoking status: Never    Smokeless tobacco: Never   Substance and Sexual Activity    Alcohol use: Yes     Comment: occasionally    Drug use: Never    Sexual activity: Yes     Partners: Male       Current Outpatient Medications   Medication Sig Dispense Refill    boric acid (PH-D) 600 mg vaginal suppository INSERT 1 SUPPOSITORY VAGINALLY DAILY AS NEEDED      dexAMETHasone (DECADRON) 4 MG Tab Take 5 tablets by mouth at 12 hours and 6 hours prior to chemotherapy. 10 tablet 5    LINZESS 290 mcg Cap capsule Take 290 mcg by mouth.      ondansetron (ZOFRAN) 8 MG tablet Take 1 tablet (8 mg total) by mouth every 8 (eight) hours as needed for Nausea. 30 tablet 1    promethazine (PHENERGAN) 12.5 MG Tab Take 1 tablet (12.5 mg total) by mouth every 4 (four) hours as needed. 60 tablet 1    traMADoL (ULTRAM) 50 mg tablet Take 50 mg by mouth every 4 (four) hours.       No current facility-administered medications for this visit.     Review of patient's allergies indicates:  No Known Allergies    Review of Systems   Respiratory:  Negative for cough and shortness of breath.    Cardiovascular:  Negative for chest pain.   Gastrointestinal:  Negative for abdominal pain and diarrhea.   Genitourinary:  Negative for frequency.   Musculoskeletal:  Negative for back pain.   Skin:  Negative for rash.   Neurological:  Negative for headaches.   Psychiatric/Behavioral:  The patient is not nervous/anxious.       Vitals:    03/09/23 0825   BP: 117/77   Pulse: 98   Resp: 18   Temp: 99 °F (37.2 °C)        Physical Exam:  Vitals: Blood pressure 117/77, pulse 98, temperature 99 °F (37.2 °C), temperature source Oral, resp. rate 18, height 5' 6" (1.676 m), weight 93.3 kg (205 lb 11.2 oz), last menstrual period 02/02/2023, SpO2 99 %.    ECOG PS 0  GA: AAOx3, NAD  HEENT: NCAT, no tenderness to head with palpation.  PERRL, EOMI, good dentition, moist oral mucous membranes. One " superficial oral ulcer mid region of upper gum line. No tenderness per patient report.   LYMPH: no cervical, axillary or supraclavicular adenopathy  CVS: s1s2 RRR, no M/R/G  RESP: CTA b/l, no crackles, no wheezes or rhonchi  BREAST:  Right breast without evidence of an obvious mass, nodularity in the upper outer quadrant felt on deep palpation, with vague borders.  No adenopathy appreciated on my exam  ABD: soft, NT, ND, BS+, no hepatosplenomegaly  EXT: no deformities, no pedal edema  SKIN: no rashes, warm and dry  NEURO: normal mentation, strength 5/5 on all 4 extremities, no sensory deficits      Assessment:       # Right breast invasive ductal carcinoma, triple negative  ER negative, SC negative, HER2 IHC 0, Ki-67 85%   [id number], stage IIIC   Discussed with patient the diagnosis of triple negative breast cancer, treatment recommendations including neoadjuvant chemotherapy followed by surgery followed by adjuvant chemotherapy + radiation therapy   Reviewed PET-CT without evidence of distant metastatic disease.  Will plan for treatment with neoadjuvant carbo Taxol pembrolizumab followed by AC pembrolizumab prior to surgical resection   S/p MRI bilateral breasts   Patient is not interested in fertility preservation  She is status post tubal ligation, discuss the need for dual contraception while on chemotherapy.  Baseline Echo 60%  S/p port placement and chemo ed  Genetic counseling done, test results pending      Plan:       - Proceed with cycle 2 day 15 of carbo and Taxol today  - Ultrasound abdomen done on 3/7/23 to evaluate for transaminitis and was noted to have small echogenic foci in the right lobe of the liver too small to characterize it might represent a tiny hemangioma. Advised to avoid using any over-the-counter herbs, supplements including colon cleanse spells, Tylenol and alcohol use given transaminitis  - Okay to reduce dexamethasone to 20 milligrams 12 hours prior to chemotherapy given patient's  adverse effects of headaches and anxiety.  - She will call office in the interim for any worsening symptoms, concerns or questions.   - Repeat labs on follow up: CBC, CMP, Mg, TSH,   - MD / LABS / TREATMENT VISIT - WEEKS      The patient was seen, interviewed and examined. Pertinent lab and radiology studies were reviewed. Pt instructed to call should develop concerning signs/symptoms or have further questions.       Portions of the record may have been created with voice recognition software. Occasional wrong-word or sound-a-like substitutions may have occurred due to the inherent limitations of voice recognition software. Read the chart carefully and recognize, using context, where substitutions have occurred.    Denisse Vance MD  Hematology / Oncology

## 2023-03-09 ENCOUNTER — OFFICE VISIT (OUTPATIENT)
Dept: HEMATOLOGY/ONCOLOGY | Facility: CLINIC | Age: 38
End: 2023-03-09
Payer: MEDICAID

## 2023-03-09 VITALS
SYSTOLIC BLOOD PRESSURE: 117 MMHG | TEMPERATURE: 99 F | HEIGHT: 66 IN | RESPIRATION RATE: 18 BRPM | HEART RATE: 98 BPM | OXYGEN SATURATION: 99 % | DIASTOLIC BLOOD PRESSURE: 77 MMHG | WEIGHT: 205.69 LBS | BODY MASS INDEX: 33.06 KG/M2

## 2023-03-09 DIAGNOSIS — C50.411 MALIGNANT NEOPLASM OF UPPER-OUTER QUADRANT OF RIGHT BREAST IN FEMALE, ESTROGEN RECEPTOR NEGATIVE: Primary | ICD-10-CM

## 2023-03-09 DIAGNOSIS — Z17.1 MALIGNANT NEOPLASM OF UPPER-OUTER QUADRANT OF RIGHT BREAST IN FEMALE, ESTROGEN RECEPTOR NEGATIVE: Primary | ICD-10-CM

## 2023-03-09 DIAGNOSIS — Z51.11 ENCOUNTER FOR ANTINEOPLASTIC CHEMOTHERAPY: ICD-10-CM

## 2023-03-09 PROCEDURE — 3078F DIAST BP <80 MM HG: CPT | Mod: CPTII,,, | Performed by: INTERNAL MEDICINE

## 2023-03-09 PROCEDURE — 3008F BODY MASS INDEX DOCD: CPT | Mod: CPTII,,, | Performed by: INTERNAL MEDICINE

## 2023-03-09 PROCEDURE — 99215 OFFICE O/P EST HI 40 MIN: CPT | Mod: ,,, | Performed by: INTERNAL MEDICINE

## 2023-03-09 PROCEDURE — 3074F PR MOST RECENT SYSTOLIC BLOOD PRESSURE < 130 MM HG: ICD-10-PCS | Mod: CPTII,,, | Performed by: INTERNAL MEDICINE

## 2023-03-09 PROCEDURE — 99215 PR OFFICE/OUTPT VISIT, EST, LEVL V, 40-54 MIN: ICD-10-PCS | Mod: ,,, | Performed by: INTERNAL MEDICINE

## 2023-03-09 PROCEDURE — 1159F PR MEDICATION LIST DOCUMENTED IN MEDICAL RECORD: ICD-10-PCS | Mod: CPTII,,, | Performed by: INTERNAL MEDICINE

## 2023-03-09 PROCEDURE — 3074F SYST BP LT 130 MM HG: CPT | Mod: CPTII,,, | Performed by: INTERNAL MEDICINE

## 2023-03-09 PROCEDURE — 3008F PR BODY MASS INDEX (BMI) DOCUMENTED: ICD-10-PCS | Mod: CPTII,,, | Performed by: INTERNAL MEDICINE

## 2023-03-09 PROCEDURE — 3078F PR MOST RECENT DIASTOLIC BLOOD PRESSURE < 80 MM HG: ICD-10-PCS | Mod: CPTII,,, | Performed by: INTERNAL MEDICINE

## 2023-03-09 PROCEDURE — 1159F MED LIST DOCD IN RCRD: CPT | Mod: CPTII,,, | Performed by: INTERNAL MEDICINE

## 2023-03-09 RX ORDER — FAMOTIDINE 10 MG/ML
20 INJECTION INTRAVENOUS
Status: CANCELLED | OUTPATIENT
Start: 2023-03-09

## 2023-03-09 RX ORDER — HYDROXYZINE PAMOATE 25 MG/1
25 CAPSULE ORAL EVERY 8 HOURS PRN
Qty: 30 CAPSULE | Refills: 1 | Status: SHIPPED | OUTPATIENT
Start: 2023-03-09

## 2023-03-09 RX ORDER — EPINEPHRINE 0.3 MG/.3ML
0.3 INJECTION SUBCUTANEOUS ONCE AS NEEDED
Status: CANCELLED | OUTPATIENT
Start: 2023-03-09

## 2023-03-09 RX ORDER — DIPHENHYDRAMINE HYDROCHLORIDE 50 MG/ML
50 INJECTION INTRAMUSCULAR; INTRAVENOUS ONCE AS NEEDED
Status: CANCELLED | OUTPATIENT
Start: 2023-03-09

## 2023-03-09 RX ORDER — SODIUM CHLORIDE 0.9 % (FLUSH) 0.9 %
10 SYRINGE (ML) INJECTION
Status: CANCELLED | OUTPATIENT
Start: 2023-03-09

## 2023-03-09 RX ORDER — HEPARIN 100 UNIT/ML
500 SYRINGE INTRAVENOUS
Status: CANCELLED | OUTPATIENT
Start: 2023-03-09

## 2023-03-16 ENCOUNTER — OFFICE VISIT (OUTPATIENT)
Dept: HEMATOLOGY/ONCOLOGY | Facility: CLINIC | Age: 38
End: 2023-03-16
Payer: MEDICAID

## 2023-03-16 VITALS
BODY MASS INDEX: 33.11 KG/M2 | RESPIRATION RATE: 18 BRPM | DIASTOLIC BLOOD PRESSURE: 77 MMHG | WEIGHT: 206 LBS | OXYGEN SATURATION: 97 % | SYSTOLIC BLOOD PRESSURE: 110 MMHG | HEIGHT: 66 IN | TEMPERATURE: 98 F | HEART RATE: 96 BPM

## 2023-03-16 DIAGNOSIS — Z17.1 MALIGNANT NEOPLASM OF UPPER-OUTER QUADRANT OF RIGHT BREAST IN FEMALE, ESTROGEN RECEPTOR NEGATIVE: ICD-10-CM

## 2023-03-16 DIAGNOSIS — R74.01 TRANSAMINITIS: Primary | ICD-10-CM

## 2023-03-16 DIAGNOSIS — C50.411 MALIGNANT NEOPLASM OF UPPER-OUTER QUADRANT OF RIGHT BREAST IN FEMALE, ESTROGEN RECEPTOR NEGATIVE: ICD-10-CM

## 2023-03-16 PROCEDURE — 3008F PR BODY MASS INDEX (BMI) DOCUMENTED: ICD-10-PCS | Mod: CPTII,,,

## 2023-03-16 PROCEDURE — 99215 PR OFFICE/OUTPT VISIT, EST, LEVL V, 40-54 MIN: ICD-10-PCS | Mod: ,,,

## 2023-03-16 PROCEDURE — 3008F BODY MASS INDEX DOCD: CPT | Mod: CPTII,,,

## 2023-03-16 PROCEDURE — 3074F PR MOST RECENT SYSTOLIC BLOOD PRESSURE < 130 MM HG: ICD-10-PCS | Mod: CPTII,,,

## 2023-03-16 PROCEDURE — 3078F DIAST BP <80 MM HG: CPT | Mod: CPTII,,,

## 2023-03-16 PROCEDURE — 1159F PR MEDICATION LIST DOCUMENTED IN MEDICAL RECORD: ICD-10-PCS | Mod: CPTII,,,

## 2023-03-16 PROCEDURE — 99215 OFFICE O/P EST HI 40 MIN: CPT | Mod: ,,,

## 2023-03-16 PROCEDURE — 3074F SYST BP LT 130 MM HG: CPT | Mod: CPTII,,,

## 2023-03-16 PROCEDURE — 1159F MED LIST DOCD IN RCRD: CPT | Mod: CPTII,,,

## 2023-03-16 PROCEDURE — 3078F PR MOST RECENT DIASTOLIC BLOOD PRESSURE < 80 MM HG: ICD-10-PCS | Mod: CPTII,,,

## 2023-03-16 RX ORDER — HEPARIN 100 UNIT/ML
500 SYRINGE INTRAVENOUS
Status: CANCELLED | OUTPATIENT
Start: 2023-03-16

## 2023-03-16 RX ORDER — EPINEPHRINE 0.3 MG/.3ML
0.3 INJECTION SUBCUTANEOUS ONCE AS NEEDED
Status: CANCELLED | OUTPATIENT
Start: 2023-03-16

## 2023-03-16 RX ORDER — DIPHENHYDRAMINE HYDROCHLORIDE 50 MG/ML
50 INJECTION INTRAMUSCULAR; INTRAVENOUS ONCE AS NEEDED
Status: CANCELLED | OUTPATIENT
Start: 2023-03-16

## 2023-03-16 RX ORDER — SODIUM CHLORIDE 0.9 % (FLUSH) 0.9 %
10 SYRINGE (ML) INJECTION
Status: CANCELLED | OUTPATIENT
Start: 2023-03-16

## 2023-03-16 RX ORDER — FAMOTIDINE 10 MG/ML
20 INJECTION INTRAVENOUS
Status: CANCELLED | OUTPATIENT
Start: 2023-03-16

## 2023-03-16 RX ORDER — PREDNISONE 20 MG/1
TABLET ORAL
Qty: 15 TABLET | Refills: 0 | Status: SHIPPED | OUTPATIENT
Start: 2023-03-16 | End: 2024-04-01

## 2023-03-16 NOTE — PROGRESS NOTES
Referring provider:  Dr. Burgess  Reason for consultation:  Triple negative right breast cancer     Patient is a 37-year-old with no significant medical history who noticed a mass right breast in 2022 which led to further workup including mammogram, ultrasound and biopsy, pathology from biopsy in 2023 revealed triple negative breast cancer, grade 3.  Patient had further workup including a PET-CT with Dr. Burgess not reveal any distant metastatic disease.  She presented to our clinic on 2023 to establish care for further disease management.      Interval History:   Today, patient continues with symptoms of intermittent itching.  Denies any chest pain, shortness of breath or rash. Denies any tingling numbness, fevers, chills, decreased appetite.      Social history:   Patient denies any history of smoking, alcohol use or recreational drug use.  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  She has an ECOG of 0.  She has a mother and sister accompanied to her initial visit.  Family history of lung cancer in her grandfather and liver cancer in 1 of her uncles.  No history of breast cancer in the family.      LABS / IMAGING        Pathology  2023 right breast core biopsy and right axillary lymph node core biopsy:  Invasive ductal carcinoma, grade 3, right axillary lymph node biopsy with fragments of poorly differentiated malignant tumor consistent with ductal carcinoma.  Normal lymph node identified.  ER negative, TN negative, HER2 IHC 0, Ki-67 85.3%.    Imagin2022 echo EF 60%     01/10/2023 PET-CT:  Hypermetabolic mass in the superior right breast in keeping with the known cancer popliteal additional areas of mild-to-moderate uptake in the superior and retroareolar right breast are understood with definite discrete mass on CT but worrisome for infiltrative breast cancer.  Multiple hypermetabolic right axillary subpectoral and right  internal mammary lymph nodes consistent with metastatic disease.  No other suspicious uptake.  Right breast mass measures 3 by 2.8 cm with an SUV of 13.7.  Inferior to the mass there was mild diffuse uptake associated with non-mass like dense breast tissue with an SUV of 4.0.  There is also abnormal uptake in the retroareolar right breast with definitive discrete mass can not be  from the dense glandular tissue with an SUV of 5.7 and measuring 1.5 x 1.1 cm.  These are concerning for infiltrative malignancies.    Laboratory   03/15/23: cr 0.81, TB 0.7, mag 2.0, TSH 0.7533, cortisol 17.9, , , wbc 4.62, hgb 11.1, plt 238, ANC 1.89, UPT neg  03/08/2023:  Creatinine 0.83, AST 73, , bilirubin 0.5, TSH 0.75, WBC 5.7, hemoglobin 11, platelets 222  03/02/2023:  WBC count 5.17, hemoglobin 10.2, MCV 91.1, platelet count 265, ANC 1.89, urine pregnancy negative, creatinine 0.9, albu3.5, ca 9.2, alkaline phosphatase 76, total bilirubin 0.3, AST 53, , magnesium 1.8, phosphorus 2.7, TSH 0.9.  02/22/2023: WBC 3.02, Hgb 10.9, MCV 88.0, , ANC 1.4 3, UPT negative, creatinine 0.82 albumin 3.7 magnesium 2.1 phosphorus 2.1, TSH 0.6540, cortisol 8.7  02/15/23 Cr 0.83, alb 3.7, LFTs WNL, mag 1.7, phosphorus 2.0, TSH 0.4434, cortisol 10.6, wbc 3.32, hgb 11.3, plt 266, ANC 1.42 UPT negative   02/08/23: cr 0.79, alb 3.5, ca 9.3, AST 36, ALT 45, mag 1.9, phosphorus 2.1, wbc 4.47, hgb 11.1, plt 264, ANC 2.28  02/01/2023:  Creatinine 0.83, albumin 3.7, LFTs within normal limits, TSH 1.09, cortisol 8.8, free T4 0.98, WBC 7.7, Hb 11.5, MCV 87.9, platelet count 283, ANC 4.09, urine pregnancy negative.    Past Medical History:   Diagnosis Date    Breast cancer     GERD (gastroesophageal reflux disease)      Past Surgical History:   Procedure Laterality Date    ABLATION      MEDIPORT INSERTION, DOUBLE Left 01/26/2023    TUBAL LIGATION         Family History   Problem Relation Age of Onset    Diabetes  Maternal Grandmother     Lung cancer Maternal Grandfather     Liver cancer Maternal Uncle        Social History     Socioeconomic History    Marital status: Single   Tobacco Use    Smoking status: Never    Smokeless tobacco: Never   Substance and Sexual Activity    Alcohol use: Yes     Comment: occasionally    Drug use: Never    Sexual activity: Yes     Partners: Male       Current Outpatient Medications   Medication Sig Dispense Refill    boric acid (PH-D) 600 mg vaginal suppository INSERT 1 SUPPOSITORY VAGINALLY DAILY AS NEEDED      dexAMETHasone (DECADRON) 4 MG Tab Take 5 tablets by mouth at 12 hours and 6 hours prior to chemotherapy. 10 tablet 5    hydrOXYzine pamoate (VISTARIL) 25 MG Cap Take 1 capsule (25 mg total) by mouth every 8 (eight) hours as needed (itching). 30 capsule 1    LINZESS 290 mcg Cap capsule Take 290 mcg by mouth.      ondansetron (ZOFRAN) 8 MG tablet Take 1 tablet (8 mg total) by mouth every 8 (eight) hours as needed for Nausea. 30 tablet 1    promethazine (PHENERGAN) 12.5 MG Tab Take 1 tablet (12.5 mg total) by mouth every 4 (four) hours as needed. 60 tablet 1    traMADoL (ULTRAM) 50 mg tablet Take 50 mg by mouth every 4 (four) hours.       No current facility-administered medications for this visit.     Review of patient's allergies indicates:  No Known Allergies    Review of Systems   Respiratory:  Negative for cough and shortness of breath.    Cardiovascular:  Negative for chest pain.   Gastrointestinal:  Negative for abdominal pain and diarrhea.   Genitourinary:  Negative for frequency.   Musculoskeletal:  Negative for back pain.   Skin:  Negative for rash.   Neurological:  Negative for headaches.   Psychiatric/Behavioral:  The patient is not nervous/anxious.       Vitals:    03/16/23 0825   BP: 110/77   Pulse: 96   Resp: 18   Temp: 97.7 °F (36.5 °C)        Physical Exam:  Vitals: Blood pressure 110/77, pulse 96, temperature 97.7 °F (36.5 °C), temperature source Oral, resp. rate 18,  "height 5' 6" (1.676 m), weight 93.4 kg (206 lb), last menstrual period 02/02/2023, SpO2 97 %.    ECOG PS 0  GA: AAOx3, NAD  HEENT: NCAT, no tenderness to head with palpation.  PERRL, EOMI, good dentition, moist oral mucous membranes. One superficial oral ulcer mid region of upper gum line. No tenderness per patient report.   LYMPH: no cervical, axillary or supraclavicular adenopathy  CVS: s1s2 RRR, no M/R/G  RESP: CTA b/l, no crackles, no wheezes or rhonchi  BREAST:  Right breast without evidence of an obvious mass, nodularity in the upper outer quadrant felt on deep palpation, with vague borders.  No adenopathy appreciated on my exam  ABD: soft, NT, ND, BS+, no hepatosplenomegaly  EXT: no deformities, no pedal edema  SKIN: no rashes, warm and dry  NEURO: normal mentation, strength 5/5 on all 4 extremities, no sensory deficits      Assessment:       # Right breast invasive ductal carcinoma, triple negative  ER negative, MD negative, HER2 IHC 0, Ki-67 85%   [id number], stage IIIC   Discussed with patient the diagnosis of triple negative breast cancer, treatment recommendations including neoadjuvant chemotherapy followed by surgery followed by adjuvant chemotherapy + radiation therapy   Reviewed PET-CT without evidence of distant metastatic disease.  Will plan for treatment with neoadjuvant carbo Taxol pembrolizumab followed by AC pembrolizumab prior to surgical resection   S/p MRI bilateral breasts   Patient is not interested in fertility preservation  She is status post tubal ligation, discuss the need for dual contraception while on chemotherapy.  Baseline Echo 60%  S/p port placement and chemo ed  Genetic counseling done, test results pending      Plan:       - Proceed with cycle 3 day 1 of carbo and Taxol only today  - Worsening transaminitis noted today. Will HOLD keytruda. Advised to continue holding over-the-counter herbs, supplements including colon cleanse spells, Tylenol and alcohol  - Prednisone 60 mg X 5 " days sent to pharmacy  - She will call office in the interim for any worsening symptoms, concerns or questions.   - RTC 1 week.  - Repeat labs on follow up: CBC, CMP, Mg, TSH,

## 2023-03-23 ENCOUNTER — OFFICE VISIT (OUTPATIENT)
Dept: HEMATOLOGY/ONCOLOGY | Facility: CLINIC | Age: 38
End: 2023-03-23
Payer: MEDICAID

## 2023-03-23 VITALS
SYSTOLIC BLOOD PRESSURE: 121 MMHG | DIASTOLIC BLOOD PRESSURE: 77 MMHG | HEIGHT: 66 IN | HEART RATE: 76 BPM | WEIGHT: 210.31 LBS | TEMPERATURE: 97 F | OXYGEN SATURATION: 98 % | RESPIRATION RATE: 18 BRPM | BODY MASS INDEX: 33.8 KG/M2

## 2023-03-23 DIAGNOSIS — Z17.1 MALIGNANT NEOPLASM OF UPPER-OUTER QUADRANT OF RIGHT BREAST IN FEMALE, ESTROGEN RECEPTOR NEGATIVE: Primary | ICD-10-CM

## 2023-03-23 DIAGNOSIS — Z51.11 ENCOUNTER FOR ANTINEOPLASTIC CHEMOTHERAPY: ICD-10-CM

## 2023-03-23 DIAGNOSIS — C50.411 MALIGNANT NEOPLASM OF UPPER-OUTER QUADRANT OF RIGHT BREAST IN FEMALE, ESTROGEN RECEPTOR NEGATIVE: Primary | ICD-10-CM

## 2023-03-23 PROCEDURE — 1160F RVW MEDS BY RX/DR IN RCRD: CPT | Mod: CPTII,,, | Performed by: NURSE PRACTITIONER

## 2023-03-23 PROCEDURE — 99214 PR OFFICE/OUTPT VISIT, EST, LEVL IV, 30-39 MIN: ICD-10-PCS | Mod: ,,, | Performed by: NURSE PRACTITIONER

## 2023-03-23 PROCEDURE — 99214 OFFICE O/P EST MOD 30 MIN: CPT | Mod: ,,, | Performed by: NURSE PRACTITIONER

## 2023-03-23 PROCEDURE — 1160F PR REVIEW ALL MEDS BY PRESCRIBER/CLIN PHARMACIST DOCUMENTED: ICD-10-PCS | Mod: CPTII,,, | Performed by: NURSE PRACTITIONER

## 2023-03-23 PROCEDURE — 3074F SYST BP LT 130 MM HG: CPT | Mod: CPTII,,, | Performed by: NURSE PRACTITIONER

## 2023-03-23 PROCEDURE — 3074F PR MOST RECENT SYSTOLIC BLOOD PRESSURE < 130 MM HG: ICD-10-PCS | Mod: CPTII,,, | Performed by: NURSE PRACTITIONER

## 2023-03-23 PROCEDURE — 3008F PR BODY MASS INDEX (BMI) DOCUMENTED: ICD-10-PCS | Mod: CPTII,,, | Performed by: NURSE PRACTITIONER

## 2023-03-23 PROCEDURE — 3078F PR MOST RECENT DIASTOLIC BLOOD PRESSURE < 80 MM HG: ICD-10-PCS | Mod: CPTII,,, | Performed by: NURSE PRACTITIONER

## 2023-03-23 PROCEDURE — 3078F DIAST BP <80 MM HG: CPT | Mod: CPTII,,, | Performed by: NURSE PRACTITIONER

## 2023-03-23 PROCEDURE — 1159F MED LIST DOCD IN RCRD: CPT | Mod: CPTII,,, | Performed by: NURSE PRACTITIONER

## 2023-03-23 PROCEDURE — 1159F PR MEDICATION LIST DOCUMENTED IN MEDICAL RECORD: ICD-10-PCS | Mod: CPTII,,, | Performed by: NURSE PRACTITIONER

## 2023-03-23 PROCEDURE — 3008F BODY MASS INDEX DOCD: CPT | Mod: CPTII,,, | Performed by: NURSE PRACTITIONER

## 2023-03-23 RX ORDER — SODIUM CHLORIDE 0.9 % (FLUSH) 0.9 %
10 SYRINGE (ML) INJECTION
Status: CANCELLED | OUTPATIENT
Start: 2023-03-23

## 2023-03-23 RX ORDER — DIPHENHYDRAMINE HYDROCHLORIDE 50 MG/ML
50 INJECTION INTRAMUSCULAR; INTRAVENOUS ONCE AS NEEDED
Status: CANCELLED | OUTPATIENT
Start: 2023-03-23

## 2023-03-23 RX ORDER — LACTULOSE 10 G/15ML
SOLUTION ORAL; RECTAL
COMMUNITY
Start: 2023-03-07 | End: 2024-04-01

## 2023-03-23 RX ORDER — HEPARIN 100 UNIT/ML
500 SYRINGE INTRAVENOUS
Status: CANCELLED | OUTPATIENT
Start: 2023-03-23

## 2023-03-23 RX ORDER — EPINEPHRINE 0.3 MG/.3ML
0.3 INJECTION SUBCUTANEOUS ONCE AS NEEDED
Status: CANCELLED | OUTPATIENT
Start: 2023-03-23

## 2023-03-23 RX ORDER — FAMOTIDINE 10 MG/ML
20 INJECTION INTRAVENOUS
Status: CANCELLED | OUTPATIENT
Start: 2023-03-23

## 2023-03-23 NOTE — PROGRESS NOTES
Referring provider:  Dr. Burgess  Reason for consultation:  Triple negative right breast cancer     Patient is a 37-year-old with no significant medical history who noticed a mass right breast in 2022 which led to further workup including mammogram, ultrasound and biopsy, pathology from biopsy in 2023 revealed triple negative breast cancer, grade 3.  Patient had further workup including a PET-CT with Dr. Burgess not reveal any distant metastatic disease.  She presented to our clinic on 2023 to establish care for further disease management.      Interval History:   Today 3/23/23 ,Patient is doing well overall. Denies any chest pain, shortness of breath or rash. Denies any neuropathy, fevers, chills, decreased appetite. She does have constipation at time. Denies n/v/d. She denies bleeding, unintentional weight loss or night sweats     Social history:   Patient denies any history of smoking, alcohol use or recreational drug use.  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  She has an ECOG of 0.  She has a mother and sister accompanied to her initial visit.  Family history of lung cancer in her grandfather and liver cancer in 1 of her uncles.  No history of breast cancer in the family.      LABS / IMAGING        Pathology  2023 right breast core biopsy and right axillary lymph node core biopsy:  Invasive ductal carcinoma, grade 3, right axillary lymph node biopsy with fragments of poorly differentiated malignant tumor consistent with ductal carcinoma.  Normal lymph node identified.  ER negative, IA negative, HER2 IHC 0, Ki-67 85.3%.    Imagin2022 echo EF 60%     01/10/2023 PET-CT:  Hypermetabolic mass in the superior right breast in keeping with the known cancer popliteal additional areas of mild-to-moderate uptake in the superior and retroareolar right breast are understood with definite discrete mass on CT but worrisome for  infiltrative breast cancer.  Multiple hypermetabolic right axillary subpectoral and right internal mammary lymph nodes consistent with metastatic disease.  No other suspicious uptake.  Right breast mass measures 3 by 2.8 cm with an SUV of 13.7.  Inferior to the mass there was mild diffuse uptake associated with non-mass like dense breast tissue with an SUV of 4.0.  There is also abnormal uptake in the retroareolar right breast with definitive discrete mass can not be  from the dense glandular tissue with an SUV of 5.7 and measuring 1.5 x 1.1 cm.  These are concerning for infiltrative malignancies.    Laboratory   3/23/23: cr 0.79 , Mg 1.7 TSH 1.8  AST 32  wbc 7.92 hgb 10.0 Plt 215  03/15/23: cr 0.81, TB 0.7, mag 2.0, TSH 0.7533, cortisol 17.9, , , wbc 4.62, hgb 11.1, plt 238, ANC 1.89, UPT neg  03/08/2023:  Creatinine 0.83, AST 73, , bilirubin 0.5, TSH 0.75, WBC 5.7, hemoglobin 11, platelets 222  03/02/2023:  WBC count 5.17, hemoglobin 10.2, MCV 91.1, platelet count 265, ANC 1.89, urine pregnancy negative, creatinine 0.9, albu3.5, ca 9.2, alkaline phosphatase 76, total bilirubin 0.3, AST 53, , magnesium 1.8, phosphorus 2.7, TSH 0.9.  02/22/2023: WBC 3.02, Hgb 10.9, MCV 88.0, , ANC 1.4 3, UPT negative, creatinine 0.82 albumin 3.7 magnesium 2.1 phosphorus 2.1, TSH 0.6540, cortisol 8.7  02/15/23 Cr 0.83, alb 3.7, LFTs WNL, mag 1.7, phosphorus 2.0, TSH 0.4434, cortisol 10.6, wbc 3.32, hgb 11.3, plt 266, ANC 1.42 UPT negative   02/08/23: cr 0.79, alb 3.5, ca 9.3, AST 36, ALT 45, mag 1.9, phosphorus 2.1, wbc 4.47, hgb 11.1, plt 264, ANC 2.28  02/01/2023:  Creatinine 0.83, albumin 3.7, LFTs within normal limits, TSH 1.09, cortisol 8.8, free T4 0.98, WBC 7.7, Hb 11.5, MCV 87.9, platelet count 283, ANC 4.09, urine pregnancy negative.    Past Medical History:   Diagnosis Date    Breast cancer     GERD (gastroesophageal reflux disease)      Past Surgical History:   Procedure  Laterality Date    ABLATION      MEDIPORT INSERTION, DOUBLE Left 01/26/2023    TUBAL LIGATION         Family History   Problem Relation Age of Onset    Diabetes Maternal Grandmother     Lung cancer Maternal Grandfather     Liver cancer Maternal Uncle        Social History     Socioeconomic History    Marital status: Single   Tobacco Use    Smoking status: Never    Smokeless tobacco: Never   Substance and Sexual Activity    Alcohol use: Yes     Comment: occasionally    Drug use: Never    Sexual activity: Yes     Partners: Male       Current Outpatient Medications   Medication Sig Dispense Refill    boric acid (PH-D) 600 mg vaginal suppository INSERT 1 SUPPOSITORY VAGINALLY DAILY AS NEEDED      dexAMETHasone (DECADRON) 4 MG Tab Take 5 tablets by mouth at 12 hours and 6 hours prior to chemotherapy. 10 tablet 5    hydrOXYzine pamoate (VISTARIL) 25 MG Cap Take 1 capsule (25 mg total) by mouth every 8 (eight) hours as needed (itching). 30 capsule 1    lactulose (CHRONULAC) 10 gram/15 mL solution SMARTSIG:Milliliter(s) By Mouth      LINZESS 290 mcg Cap capsule Take 290 mcg by mouth.      ondansetron (ZOFRAN) 8 MG tablet Take 1 tablet (8 mg total) by mouth every 8 (eight) hours as needed for Nausea. 30 tablet 1    predniSONE (DELTASONE) 20 MG tablet Take 3 (20 mg) tablets by mouth daily for 5 days 15 tablet 0    promethazine (PHENERGAN) 12.5 MG Tab Take 1 tablet (12.5 mg total) by mouth every 4 (four) hours as needed. 60 tablet 1    traMADoL (ULTRAM) 50 mg tablet Take 50 mg by mouth every 4 (four) hours.       No current facility-administered medications for this visit.     Review of patient's allergies indicates:  No Known Allergies    Review of Systems   Constitutional: Negative.    Eyes: Negative.    Respiratory: Negative.  Negative for cough and shortness of breath.    Cardiovascular: Negative.  Negative for chest pain.   Gastrointestinal:  Positive for constipation. Negative for abdominal pain and diarrhea.  "  Genitourinary: Negative.  Negative for frequency.   Musculoskeletal: Negative.  Negative for back pain.   Skin: Negative.  Negative for rash.   Neurological: Negative.  Negative for headaches.   Psychiatric/Behavioral: Negative.  The patient is not nervous/anxious.       Vitals:    03/23/23 0825   BP: 121/77   Pulse: 76   Resp: 18   Temp: 97.4 °F (36.3 °C)        Physical Exam:  Vitals: Blood pressure 121/77, pulse 76, temperature 97.4 °F (36.3 °C), temperature source Oral, resp. rate 18, height 5' 6" (1.676 m), weight 95.4 kg (210 lb 4.8 oz), SpO2 98 %.    ECOG PS 0  GA: AAOx3, NAD  HEENT: NCAT, no tenderness to head with palpation.  PERRL, EOMI, good dentition, moist oral mucous membranes. One superficial oral ulcer mid region of upper gum line. No tenderness per patient report.   LYMPH: no cervical, axillary or supraclavicular adenopathy  CVS: s1s2 RRR, no M/R/G  RESP: CTA b/l, no crackles, no wheezes or rhonchi  BREAST:  Right breast without evidence of an obvious mass, nodularity in the upper outer quadrant felt on deep palpation, with vague borders.  No adenopathy appreciated on my exam  ABD: soft, NT, ND, BS+, no hepatosplenomegaly  EXT: no deformities, no pedal edema  SKIN: no rashes, warm and dry  NEURO: normal mentation, strength 5/5 on all 4 extremities, no sensory deficits      Assessment:       # Right breast invasive ductal carcinoma, triple negative  ER negative, KY negative, HER2 IHC 0, Ki-67 85%   [id number], stage IIIC   Discussed with patient the diagnosis of triple negative breast cancer, treatment recommendations including neoadjuvant chemotherapy followed by surgery followed by adjuvant chemotherapy + radiation therapy   Reviewed PET-CT without evidence of distant metastatic disease.  Will plan for treatment with neoadjuvant carbo Taxol pembrolizumab followed by AC pembrolizumab prior to surgical resection   S/p MRI bilateral breasts   Patient is not interested in fertility preservation  She is " status post tubal ligation, discuss the need for dual contraception while on chemotherapy.  Baseline Echo 60%  S/p port placement and chemo ed  Genetic counseling done, test results pending      Plan:       - Proceed with cycle 3 day 8 of carbo and Taxol only today  - transaminitis improving  continue to HOLD keytruda. Advised to continue holding over-the-counter herbs, supplements including colon cleanse spells, Tylenol and alcohol  - completed Prednisone 60 mg   - She will call office in the interim for any worsening symptoms, concerns or questions.   - RTC 1 week.  - Repeat labs on follow up: CBC, CMP, Mg, TSH,       Holly Parisi, ARPANC

## 2023-03-30 ENCOUNTER — OFFICE VISIT (OUTPATIENT)
Dept: HEMATOLOGY/ONCOLOGY | Facility: CLINIC | Age: 38
End: 2023-03-30
Payer: MEDICAID

## 2023-03-30 VITALS
OXYGEN SATURATION: 98 % | HEIGHT: 66 IN | WEIGHT: 211.69 LBS | DIASTOLIC BLOOD PRESSURE: 80 MMHG | TEMPERATURE: 98 F | HEART RATE: 104 BPM | SYSTOLIC BLOOD PRESSURE: 116 MMHG | BODY MASS INDEX: 34.02 KG/M2 | RESPIRATION RATE: 20 BRPM

## 2023-03-30 DIAGNOSIS — C50.411 MALIGNANT NEOPLASM OF UPPER-OUTER QUADRANT OF RIGHT BREAST IN FEMALE, ESTROGEN RECEPTOR NEGATIVE: Primary | ICD-10-CM

## 2023-03-30 DIAGNOSIS — Z17.1 MALIGNANT NEOPLASM OF UPPER-OUTER QUADRANT OF RIGHT BREAST IN FEMALE, ESTROGEN RECEPTOR NEGATIVE: Primary | ICD-10-CM

## 2023-03-30 DIAGNOSIS — Z51.11 ENCOUNTER FOR ANTINEOPLASTIC CHEMOTHERAPY: ICD-10-CM

## 2023-03-30 PROCEDURE — 1160F PR REVIEW ALL MEDS BY PRESCRIBER/CLIN PHARMACIST DOCUMENTED: ICD-10-PCS | Mod: CPTII,,, | Performed by: NURSE PRACTITIONER

## 2023-03-30 PROCEDURE — 99214 PR OFFICE/OUTPT VISIT, EST, LEVL IV, 30-39 MIN: ICD-10-PCS | Mod: ,,, | Performed by: NURSE PRACTITIONER

## 2023-03-30 PROCEDURE — 3008F BODY MASS INDEX DOCD: CPT | Mod: CPTII,,, | Performed by: NURSE PRACTITIONER

## 2023-03-30 PROCEDURE — 3074F PR MOST RECENT SYSTOLIC BLOOD PRESSURE < 130 MM HG: ICD-10-PCS | Mod: CPTII,,, | Performed by: NURSE PRACTITIONER

## 2023-03-30 PROCEDURE — 3074F SYST BP LT 130 MM HG: CPT | Mod: CPTII,,, | Performed by: NURSE PRACTITIONER

## 2023-03-30 PROCEDURE — 1159F MED LIST DOCD IN RCRD: CPT | Mod: CPTII,,, | Performed by: NURSE PRACTITIONER

## 2023-03-30 PROCEDURE — 99214 OFFICE O/P EST MOD 30 MIN: CPT | Mod: ,,, | Performed by: NURSE PRACTITIONER

## 2023-03-30 PROCEDURE — 3079F PR MOST RECENT DIASTOLIC BLOOD PRESSURE 80-89 MM HG: ICD-10-PCS | Mod: CPTII,,, | Performed by: NURSE PRACTITIONER

## 2023-03-30 PROCEDURE — 3079F DIAST BP 80-89 MM HG: CPT | Mod: CPTII,,, | Performed by: NURSE PRACTITIONER

## 2023-03-30 PROCEDURE — 1160F RVW MEDS BY RX/DR IN RCRD: CPT | Mod: CPTII,,, | Performed by: NURSE PRACTITIONER

## 2023-03-30 PROCEDURE — 1159F PR MEDICATION LIST DOCUMENTED IN MEDICAL RECORD: ICD-10-PCS | Mod: CPTII,,, | Performed by: NURSE PRACTITIONER

## 2023-03-30 PROCEDURE — 3008F PR BODY MASS INDEX (BMI) DOCUMENTED: ICD-10-PCS | Mod: CPTII,,, | Performed by: NURSE PRACTITIONER

## 2023-03-30 RX ORDER — DIPHENHYDRAMINE HYDROCHLORIDE 50 MG/ML
50 INJECTION INTRAMUSCULAR; INTRAVENOUS ONCE AS NEEDED
Status: CANCELLED | OUTPATIENT
Start: 2023-03-30

## 2023-03-30 RX ORDER — EPINEPHRINE 0.3 MG/.3ML
0.3 INJECTION SUBCUTANEOUS ONCE AS NEEDED
Status: CANCELLED | OUTPATIENT
Start: 2023-03-30

## 2023-03-30 RX ORDER — SODIUM CHLORIDE 0.9 % (FLUSH) 0.9 %
10 SYRINGE (ML) INJECTION
Status: CANCELLED | OUTPATIENT
Start: 2023-03-30

## 2023-03-30 RX ORDER — FAMOTIDINE 10 MG/ML
20 INJECTION INTRAVENOUS
Status: CANCELLED | OUTPATIENT
Start: 2023-03-30

## 2023-03-30 RX ORDER — HEPARIN 100 UNIT/ML
500 SYRINGE INTRAVENOUS
Status: CANCELLED | OUTPATIENT
Start: 2023-03-30

## 2023-03-30 NOTE — PROGRESS NOTES
Referring provider:  Dr. Burgess  Reason for consultation:  Triple negative right breast cancer     Patient is a 37-year-old with no significant medical history who noticed a mass right breast in 2022 which led to further workup including mammogram, ultrasound and biopsy, pathology from biopsy in 2023 revealed triple negative breast cancer, grade 3.  Patient had further workup including a PET-CT with Dr. Burgess not reveal any distant metastatic disease.  She presented to our clinic on 2023 to establish care for further disease management.      Interval History:   Today 3/30 /23 ,Patient is doing well overall, she states that she tolerated her last treatment well.  Denies any chest pain, shortness of breath or rash. Denies  fevers, chills, decreased appetite. She does have constipation but is not taking her linzess.  Denies n/v/d. She denies bleeding, unintentional weight loss or night sweats     Social history:   Patient denies any history of smoking, alcohol use or recreational drug use.  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  She has an ECOG of 0.  She has a mother and sister accompanied to her initial visit.  Family history of lung cancer in her grandfather and liver cancer in 1 of her uncles.  No history of breast cancer in the family.      LABS / IMAGING        Pathology  2023 right breast core biopsy and right axillary lymph node core biopsy:  Invasive ductal carcinoma, grade 3, right axillary lymph node biopsy with fragments of poorly differentiated malignant tumor consistent with ductal carcinoma.  Normal lymph node identified.  ER negative, ND negative, HER2 IHC 0, Ki-67 85.3%.    Imagin2022 echo EF 60%     01/10/2023 PET-CT:  Hypermetabolic mass in the superior right breast in keeping with the known cancer popliteal additional areas of mild-to-moderate uptake in the superior and retroareolar right breast are  understood with definite discrete mass on CT but worrisome for infiltrative breast cancer.  Multiple hypermetabolic right axillary subpectoral and right internal mammary lymph nodes consistent with metastatic disease.  No other suspicious uptake.  Right breast mass measures 3 by 2.8 cm with an SUV of 13.7.  Inferior to the mass there was mild diffuse uptake associated with non-mass like dense breast tissue with an SUV of 4.0.  There is also abnormal uptake in the retroareolar right breast with definitive discrete mass can not be  from the dense glandular tissue with an SUV of 5.7 and measuring 1.5 x 1.1 cm.  These are concerning for infiltrative malignancies.    Laboratory   3/23/23: cr 0.79 , Mg 1.7 TSH 1.8  AST 32  wbc 7.92 hgb 10.0 Plt 215  03/15/23: cr 0.81, TB 0.7, mag 2.0, TSH 0.7533, cortisol 17.9, , , wbc 4.62, hgb 11.1, plt 238, ANC 1.89, UPT neg  03/08/2023:  Creatinine 0.83, AST 73, , bilirubin 0.5, TSH 0.75, WBC 5.7, hemoglobin 11, platelets 222  03/02/2023:  WBC count 5.17, hemoglobin 10.2, MCV 91.1, platelet count 265, ANC 1.89, urine pregnancy negative, creatinine 0.9, albu3.5, ca 9.2, alkaline phosphatase 76, total bilirubin 0.3, AST 53, , magnesium 1.8, phosphorus 2.7, TSH 0.9.  02/22/2023: WBC 3.02, Hgb 10.9, MCV 88.0, , ANC 1.4 3, UPT negative, creatinine 0.82 albumin 3.7 magnesium 2.1 phosphorus 2.1, TSH 0.6540, cortisol 8.7  02/15/23 Cr 0.83, alb 3.7, LFTs WNL, mag 1.7, phosphorus 2.0, TSH 0.4434, cortisol 10.6, wbc 3.32, hgb 11.3, plt 266, ANC 1.42 UPT negative   02/08/23: cr 0.79, alb 3.5, ca 9.3, AST 36, ALT 45, mag 1.9, phosphorus 2.1, wbc 4.47, hgb 11.1, plt 264, ANC 2.28  02/01/2023:  Creatinine 0.83, albumin 3.7, LFTs within normal limits, TSH 1.09, cortisol 8.8, free T4 0.98, WBC 7.7, Hb 11.5, MCV 87.9, platelet count 283, ANC 4.09, urine pregnancy negative.    Past Medical History:   Diagnosis Date    Breast cancer     GERD  (gastroesophageal reflux disease)      Past Surgical History:   Procedure Laterality Date    ABLATION      MEDIPORT INSERTION, DOUBLE Left 01/26/2023    TUBAL LIGATION         Family History   Problem Relation Age of Onset    Diabetes Maternal Grandmother     Lung cancer Maternal Grandfather     Liver cancer Maternal Uncle        Social History     Socioeconomic History    Marital status: Single   Tobacco Use    Smoking status: Never    Smokeless tobacco: Never   Substance and Sexual Activity    Alcohol use: Yes     Comment: occasionally    Drug use: Never    Sexual activity: Yes     Partners: Male       Current Outpatient Medications   Medication Sig Dispense Refill    boric acid (PH-D) 600 mg vaginal suppository INSERT 1 SUPPOSITORY VAGINALLY DAILY AS NEEDED      dexAMETHasone (DECADRON) 4 MG Tab Take 5 tablets by mouth at 12 hours and 6 hours prior to chemotherapy. 10 tablet 5    hydrOXYzine pamoate (VISTARIL) 25 MG Cap Take 1 capsule (25 mg total) by mouth every 8 (eight) hours as needed (itching). 30 capsule 1    lactulose (CHRONULAC) 10 gram/15 mL solution SMARTSIG:Milliliter(s) By Mouth      LINZESS 290 mcg Cap capsule Take 290 mcg by mouth.      ondansetron (ZOFRAN) 8 MG tablet Take 1 tablet (8 mg total) by mouth every 8 (eight) hours as needed for Nausea. 30 tablet 1    predniSONE (DELTASONE) 20 MG tablet Take 3 (20 mg) tablets by mouth daily for 5 days 15 tablet 0    promethazine (PHENERGAN) 12.5 MG Tab Take 1 tablet (12.5 mg total) by mouth every 4 (four) hours as needed. 60 tablet 1    traMADoL (ULTRAM) 50 mg tablet Take 50 mg by mouth every 4 (four) hours.       No current facility-administered medications for this visit.     Review of patient's allergies indicates:  No Known Allergies    Review of Systems   Constitutional: Negative.    Eyes: Negative.    Respiratory: Negative.  Negative for cough and shortness of breath.    Cardiovascular: Negative.  Negative for chest pain.   Gastrointestinal:   "Positive for constipation and heartburn. Negative for abdominal pain and diarrhea.   Genitourinary: Negative.  Negative for frequency.   Musculoskeletal: Negative.  Negative for back pain.   Skin: Negative.  Negative for rash.   Neurological:  Positive for tingling. Negative for headaches.   Psychiatric/Behavioral: Negative.  The patient is not nervous/anxious.       Vitals:    03/30/23 0835   BP: 116/80   Pulse: 104   Resp: 20   Temp: 98 °F (36.7 °C)        Physical Exam:  Vitals: Blood pressure 116/80, pulse 104, temperature 98 °F (36.7 °C), temperature source Oral, resp. rate 20, height 5' 6" (1.676 m), weight 96 kg (211 lb 11.2 oz), last menstrual period 02/02/2023, SpO2 98 %.    ECOG PS 0  GA: AAOx3, NAD  HEENT: NCAT, no tenderness to head with palpation.  PERRL, EOMI, good dentition, moist oral mucous membranes. One superficial oral ulcer mid region of upper gum line. No tenderness per patient report.   LYMPH: no cervical, axillary or supraclavicular adenopathy  CVS: s1s2 RRR, no M/R/G  RESP: CTA b/l, no crackles, no wheezes or rhonchi  BREAST:  Right breast without evidence of an obvious mass, nodularity in the upper outer quadrant felt on deep palpation, with vague borders.  No adenopathy appreciated on my exam  ABD: soft, NT, ND, BS+, no hepatosplenomegaly  EXT: no deformities, no pedal edema  SKIN: no rashes, warm and dry  NEURO: normal mentation, strength 5/5 on all 4 extremities, no sensory deficits      Assessment:       # Right breast invasive ductal carcinoma, triple negative  ER negative, GA negative, HER2 IHC 0, Ki-67 85%   [id number], stage IIIC   Discussed with patient the diagnosis of triple negative breast cancer, treatment recommendations including neoadjuvant chemotherapy followed by surgery followed by adjuvant chemotherapy + radiation therapy   Reviewed PET-CT without evidence of distant metastatic disease.  Will plan for treatment with neoadjuvant carbo Taxol pembrolizumab followed by AC " pembrolizumab prior to surgical resection   S/p MRI bilateral breasts   Patient is not interested in fertility preservation  She is status post tubal ligation, discuss the need for dual contraception while on chemotherapy.  Baseline Echo 60%  S/p port placement and chemo ed  Genetic counseling done, test results pending      Plan:       - Proceed with cycle 3 day 15 of carbo and Taxol only today  Ok to treatment with ANC 0.95   - transaminitis slightly elevated  continue to HOLD keytruda. Advised to continue holding over-the-counter herbs, supplements including colon cleanse spells, Tylenol and alcohol  - completed Prednisone 60 mg, can also try Milk thistle to see if this helps with LFTs   - She will call office in the interim for any worsening symptoms, concerns or questions.   - RTC 1 week.  - Repeat labs on follow up: CBC, CMP, Mg  -constipation: recommend patient restart Farideh Parisi, ARPANC

## 2023-04-05 ENCOUNTER — OFFICE VISIT (OUTPATIENT)
Dept: HEMATOLOGY/ONCOLOGY | Facility: CLINIC | Age: 38
End: 2023-04-05
Payer: MEDICAID

## 2023-04-05 VITALS
DIASTOLIC BLOOD PRESSURE: 80 MMHG | RESPIRATION RATE: 20 BRPM | SYSTOLIC BLOOD PRESSURE: 116 MMHG | TEMPERATURE: 99 F | HEART RATE: 88 BPM | WEIGHT: 210.13 LBS | BODY MASS INDEX: 33.77 KG/M2 | HEIGHT: 66 IN | OXYGEN SATURATION: 100 %

## 2023-04-05 DIAGNOSIS — Z17.1 MALIGNANT NEOPLASM OF UPPER-OUTER QUADRANT OF RIGHT BREAST IN FEMALE, ESTROGEN RECEPTOR NEGATIVE: Primary | ICD-10-CM

## 2023-04-05 DIAGNOSIS — Z51.11 ENCOUNTER FOR ANTINEOPLASTIC CHEMOTHERAPY: ICD-10-CM

## 2023-04-05 DIAGNOSIS — C50.411 MALIGNANT NEOPLASM OF UPPER-OUTER QUADRANT OF RIGHT BREAST IN FEMALE, ESTROGEN RECEPTOR NEGATIVE: Primary | ICD-10-CM

## 2023-04-05 PROCEDURE — 1159F MED LIST DOCD IN RCRD: CPT | Mod: CPTII,,, | Performed by: INTERNAL MEDICINE

## 2023-04-05 PROCEDURE — 1160F PR REVIEW ALL MEDS BY PRESCRIBER/CLIN PHARMACIST DOCUMENTED: ICD-10-PCS | Mod: CPTII,,, | Performed by: INTERNAL MEDICINE

## 2023-04-05 PROCEDURE — 3008F BODY MASS INDEX DOCD: CPT | Mod: CPTII,,, | Performed by: INTERNAL MEDICINE

## 2023-04-05 PROCEDURE — 99215 PR OFFICE/OUTPT VISIT, EST, LEVL V, 40-54 MIN: ICD-10-PCS | Mod: ,,, | Performed by: INTERNAL MEDICINE

## 2023-04-05 PROCEDURE — 3079F DIAST BP 80-89 MM HG: CPT | Mod: CPTII,,, | Performed by: INTERNAL MEDICINE

## 2023-04-05 PROCEDURE — 3079F PR MOST RECENT DIASTOLIC BLOOD PRESSURE 80-89 MM HG: ICD-10-PCS | Mod: CPTII,,, | Performed by: INTERNAL MEDICINE

## 2023-04-05 PROCEDURE — 99215 OFFICE O/P EST HI 40 MIN: CPT | Mod: ,,, | Performed by: INTERNAL MEDICINE

## 2023-04-05 PROCEDURE — 1159F PR MEDICATION LIST DOCUMENTED IN MEDICAL RECORD: ICD-10-PCS | Mod: CPTII,,, | Performed by: INTERNAL MEDICINE

## 2023-04-05 PROCEDURE — 1160F RVW MEDS BY RX/DR IN RCRD: CPT | Mod: CPTII,,, | Performed by: INTERNAL MEDICINE

## 2023-04-05 PROCEDURE — 3074F PR MOST RECENT SYSTOLIC BLOOD PRESSURE < 130 MM HG: ICD-10-PCS | Mod: CPTII,,, | Performed by: INTERNAL MEDICINE

## 2023-04-05 PROCEDURE — 3074F SYST BP LT 130 MM HG: CPT | Mod: CPTII,,, | Performed by: INTERNAL MEDICINE

## 2023-04-05 PROCEDURE — 3008F PR BODY MASS INDEX (BMI) DOCUMENTED: ICD-10-PCS | Mod: CPTII,,, | Performed by: INTERNAL MEDICINE

## 2023-04-05 RX ORDER — SODIUM CHLORIDE 0.9 % (FLUSH) 0.9 %
10 SYRINGE (ML) INJECTION
Status: CANCELLED | OUTPATIENT
Start: 2023-04-06

## 2023-04-05 RX ORDER — HEPARIN 100 UNIT/ML
500 SYRINGE INTRAVENOUS
Status: CANCELLED | OUTPATIENT
Start: 2023-04-06

## 2023-04-05 RX ORDER — FAMOTIDINE 10 MG/ML
20 INJECTION INTRAVENOUS
Status: CANCELLED | OUTPATIENT
Start: 2023-04-06

## 2023-04-05 RX ORDER — EPINEPHRINE 0.3 MG/.3ML
0.3 INJECTION SUBCUTANEOUS ONCE AS NEEDED
Status: CANCELLED | OUTPATIENT
Start: 2023-04-06

## 2023-04-05 RX ORDER — DIPHENHYDRAMINE HYDROCHLORIDE 50 MG/ML
50 INJECTION INTRAMUSCULAR; INTRAVENOUS ONCE AS NEEDED
Status: CANCELLED | OUTPATIENT
Start: 2023-04-06

## 2023-04-05 NOTE — PROGRESS NOTES
Referring provider:  Dr. Burgess  Reason for consultation:  Triple negative right breast cancer     HPI  Patient is a 37-year-old with no significant medical history who noticed a mass right breast in November 2022 which led to further workup including mammogram, ultrasound and biopsy, pathology from biopsy in January 2023 revealed triple negative breast cancer, grade 3.  Patient had further workup including a PET-CT with Dr. Burgess not reveal any distant metastatic disease.  She presented to our clinic on 01/23/2023 to establish care for further disease management.    Interval History:   Today 3/30 /23 ,Patient is doing well overall, she states that she tolerated her last treatment well.  Denies any chest pain, shortness of breath or rash. Denies  fevers, chills, decreased appetite. She does have constipation but is not taking her linzess.  Denies n/v/d. She denies bleeding, unintentional weight loss or night sweats   April 5, 2023  OV.  Mrs. Cordoba, comes in to continue neoadjuvant chemotherapy with carbo platinum and Taxol with pembrolizumab.  She is asymptomatic.  However her ALT and AST are both elevated a 4 and 5 fold, that remain elevated after a trial of steroids.  She is on approximately her 3rd course, with a complete clinical response or disappearance on the of the mass in the right breast.  She does not have a rash, and is essentially asymptomatic except for the elevation of the liver enzymes associated with the pembrolizumab.     Review of Systems   Constitutional: Negative.    Eyes: Negative.    Respiratory: Negative.  Negative for cough and shortness of breath.    Cardiovascular: Negative.  Negative for chest pain.   Gastrointestinal:  Positive for constipation and heartburn. Negative for abdominal pain and diarrhea.   Genitourinary: Negative.  Negative for frequency.   Musculoskeletal: Negative.  Negative for back pain.   Skin: Negative.  Negative for rash.   Neurological:  Positive for tingling.  Negative for headaches.   Psychiatric/Behavioral: Negative.  The patient is not nervous/anxious.         Social history:   Patient denies any history of smoking, alcohol use or recreational drug use.  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  She has an ECOG of 0.  She has a mother and sister accompanied to her initial visit.  Family history of lung cancer in her grandfather and liver cancer in 1 of her uncles.  No history of breast cancer in the family.      LABS / IMAGING        Pathology  2023 right breast core biopsy and right axillary lymph node core biopsy:  Invasive ductal carcinoma, grade 3, right axillary lymph node biopsy with fragments of poorly differentiated malignant tumor consistent with ductal carcinoma.  Normal lymph node identified.  ER negative, AK negative, HER2 IHC 0, Ki-67 85.3%.    Imagin2022 echo EF 60%     01/10/2023 PET-CT:  Hypermetabolic mass in the superior right breast in keeping with the known cancer popliteal additional areas of mild-to-moderate uptake in the superior and retroareolar right breast are understood with definite discrete mass on CT but worrisome for infiltrative breast cancer.  Multiple hypermetabolic right axillary subpectoral and right internal mammary lymph nodes consistent with metastatic disease.  No other suspicious uptake.  Right breast mass measures 3 by 2.8 cm with an SUV of 13.7.  Inferior to the mass there was mild diffuse uptake associated with non-mass like dense breast tissue with an SUV of 4.0.  There is also abnormal uptake in the retroareolar right breast with definitive discrete mass can not be  from the dense glandular tissue with an SUV of 5.7 and measuring 1.5 x 1.1 cm.  These are concerning for infiltrative malignancies.    Laboratory CBC and CMP obtained on 2023 reveals an elevated AST at 1:14 a.m. and ALT at 2:39 a.m., consider moderate hepatotoxicity most likely  associated with the immunotherapy.  The CBC shows a hemoglobin of 10 g, hematocrit 30% WBC 3000, platelets 186.  Neutrophil count is 550.        Past Medical History:   Diagnosis Date    Breast cancer     GERD (gastroesophageal reflux disease)      Past Surgical History:   Procedure Laterality Date    ABLATION      MEDIPORT INSERTION, DOUBLE Left 01/26/2023    TUBAL LIGATION         Family History   Problem Relation Age of Onset    Diabetes Maternal Grandmother     Lung cancer Maternal Grandfather     Liver cancer Maternal Uncle        Social History     Socioeconomic History    Marital status: Single   Tobacco Use    Smoking status: Never    Smokeless tobacco: Never   Substance and Sexual Activity    Alcohol use: Yes     Comment: occasionally    Drug use: Never    Sexual activity: Yes     Partners: Male       Current Outpatient Medications   Medication Sig Dispense Refill    boric acid (PH-D) 600 mg vaginal suppository INSERT 1 SUPPOSITORY VAGINALLY DAILY AS NEEDED      dexAMETHasone (DECADRON) 4 MG Tab Take 5 tablets by mouth at 12 hours and 6 hours prior to chemotherapy. 10 tablet 5    hydrOXYzine pamoate (VISTARIL) 25 MG Cap Take 1 capsule (25 mg total) by mouth every 8 (eight) hours as needed (itching). 30 capsule 1    lactulose (CHRONULAC) 10 gram/15 mL solution SMARTSIG:Milliliter(s) By Mouth      LINZESS 290 mcg Cap capsule Take 290 mcg by mouth.      ondansetron (ZOFRAN) 8 MG tablet Take 1 tablet (8 mg total) by mouth every 8 (eight) hours as needed for Nausea. 30 tablet 1    predniSONE (DELTASONE) 20 MG tablet Take 3 (20 mg) tablets by mouth daily for 5 days 15 tablet 0    promethazine (PHENERGAN) 12.5 MG Tab Take 1 tablet (12.5 mg total) by mouth every 4 (four) hours as needed. 60 tablet 1    traMADoL (ULTRAM) 50 mg tablet Take 50 mg by mouth every 4 (four) hours.       No current facility-administered medications for this visit.     Review of patient's allergies indicates:  No Known Allergies      "          Physical Exam:   Blood pressure 116/80, pulse 88, temperature 98.7 °F (37.1 °C), temperature source Oral, resp. rate 20, height 5' 6" (1.676 m), weight 95.3 kg (210 lb 1.6 oz), last menstrual period 02/02/2023, SpO2 100 %.   ECOG PS 0  GA: AAOx3, NAD  HEENT: NCAT, no tenderness to head with palpation.  PERRL, EOMI, good dentition, moist oral mucous membranes. One superficial oral ulcer mid region of upper gum line. No tenderness per patient report.   LYMPH: no cervical, axillary or supraclavicular adenopathy  CVS: s1s2 RRR, no M/R/G  RESP: CTA b/l, no crackles, no wheezes or rhonchi  BREAST:  Right breast without evidence of an obvious mass, nodularity in the upper outer quadrant felt on deep palpation, with vague borders.  No adenopathy appreciated on my exam  ABD: soft, NT, ND, BS+, no hepatosplenomegaly  EXT: no deformities, no pedal edema  SKIN: no rashes, warm and dry  NEURO: normal mentation, strength 5/5 on all 4 extremities, no sensory deficits  Bilateral breast exam.  the breasts were pendulous, however there was no skin changes, the original primary in the upper inner quadrant of the right breast was not palpable.  There was no areola or nipple inversion or changes.    Assessment:       # Right breast invasive ductal carcinoma, triple negative  ER negative, ME negative, HER2 IHC 0, Ki-67 85%   [id number], stage IIIC   Discussed with patient the diagnosis of triple negative breast cancer, treatment recommendations including neoadjuvant chemotherapy followed by surgery followed by adjuvant chemotherapy + radiation therapy   Reviewed PET-CT without evidence of distant metastatic disease.  Will plan for treatment with neoadjuvant carbo Taxol pembrolizumab followed by AC pembrolizumab prior to surgical resection   S/p MRI bilateral breasts   Patient is not interested in fertility preservation  She is status post tubal ligation, discuss the need for dual contraception while on chemotherapy.  Baseline " Echo 60%  S/p port placement and chemo ed  Genetic counseling done, test results pending      Plan:       - Proceed with cycle 4 day 15 of carbo and Taxol only today.  We will continue to hold the pembrolizumab, given the moderate or grade 3 hepatotoxicity  Ok to treatment with ANC 0.95   - transaminitis slightly elevated  continue to HOLD keytruda. Advised to continue holding over-the-counter herbs, supplements including colon cleanse spells, Tylenol and alcohol   - She will call office in the interim for any worsening symptoms, concerns or questions.   - RTC 1 week.  - Repeat labs on follow up: CBC, CMP, Mg  -constipation: recommend patient restart Farideh Hatfield MD

## 2023-04-10 DIAGNOSIS — Z17.1 MALIGNANT NEOPLASM OF UPPER-OUTER QUADRANT OF RIGHT BREAST IN FEMALE, ESTROGEN RECEPTOR NEGATIVE: ICD-10-CM

## 2023-04-10 DIAGNOSIS — C50.411 MALIGNANT NEOPLASM OF UPPER-OUTER QUADRANT OF RIGHT BREAST IN FEMALE, ESTROGEN RECEPTOR NEGATIVE: ICD-10-CM

## 2023-04-10 RX ORDER — DEXAMETHASONE 4 MG/1
TABLET ORAL
Qty: 10 TABLET | Refills: 5 | Status: SHIPPED | OUTPATIENT
Start: 2023-04-10 | End: 2024-04-01

## 2023-04-13 ENCOUNTER — OFFICE VISIT (OUTPATIENT)
Dept: HEMATOLOGY/ONCOLOGY | Facility: CLINIC | Age: 38
End: 2023-04-13
Payer: MEDICAID

## 2023-04-13 VITALS
SYSTOLIC BLOOD PRESSURE: 115 MMHG | OXYGEN SATURATION: 99 % | DIASTOLIC BLOOD PRESSURE: 79 MMHG | HEART RATE: 103 BPM | RESPIRATION RATE: 20 BRPM | WEIGHT: 210 LBS | HEIGHT: 66 IN | BODY MASS INDEX: 33.75 KG/M2 | TEMPERATURE: 98 F

## 2023-04-13 DIAGNOSIS — Z17.1 MALIGNANT NEOPLASM OF UPPER-OUTER QUADRANT OF RIGHT BREAST IN FEMALE, ESTROGEN RECEPTOR NEGATIVE: Primary | ICD-10-CM

## 2023-04-13 DIAGNOSIS — Z51.11 ENCOUNTER FOR ANTINEOPLASTIC CHEMOTHERAPY: ICD-10-CM

## 2023-04-13 DIAGNOSIS — R74.01 TRANSAMINITIS: ICD-10-CM

## 2023-04-13 DIAGNOSIS — C50.411 MALIGNANT NEOPLASM OF UPPER-OUTER QUADRANT OF RIGHT BREAST IN FEMALE, ESTROGEN RECEPTOR NEGATIVE: Primary | ICD-10-CM

## 2023-04-13 PROCEDURE — 3008F PR BODY MASS INDEX (BMI) DOCUMENTED: ICD-10-PCS | Mod: CPTII,,, | Performed by: INTERNAL MEDICINE

## 2023-04-13 PROCEDURE — 1160F PR REVIEW ALL MEDS BY PRESCRIBER/CLIN PHARMACIST DOCUMENTED: ICD-10-PCS | Mod: CPTII,,, | Performed by: INTERNAL MEDICINE

## 2023-04-13 PROCEDURE — 3074F SYST BP LT 130 MM HG: CPT | Mod: CPTII,,, | Performed by: INTERNAL MEDICINE

## 2023-04-13 PROCEDURE — 99215 PR OFFICE/OUTPT VISIT, EST, LEVL V, 40-54 MIN: ICD-10-PCS | Mod: ,,, | Performed by: INTERNAL MEDICINE

## 2023-04-13 PROCEDURE — 1159F PR MEDICATION LIST DOCUMENTED IN MEDICAL RECORD: ICD-10-PCS | Mod: CPTII,,, | Performed by: INTERNAL MEDICINE

## 2023-04-13 PROCEDURE — 3008F BODY MASS INDEX DOCD: CPT | Mod: CPTII,,, | Performed by: INTERNAL MEDICINE

## 2023-04-13 PROCEDURE — 3078F DIAST BP <80 MM HG: CPT | Mod: CPTII,,, | Performed by: INTERNAL MEDICINE

## 2023-04-13 PROCEDURE — 1160F RVW MEDS BY RX/DR IN RCRD: CPT | Mod: CPTII,,, | Performed by: INTERNAL MEDICINE

## 2023-04-13 PROCEDURE — 3074F PR MOST RECENT SYSTOLIC BLOOD PRESSURE < 130 MM HG: ICD-10-PCS | Mod: CPTII,,, | Performed by: INTERNAL MEDICINE

## 2023-04-13 PROCEDURE — 1159F MED LIST DOCD IN RCRD: CPT | Mod: CPTII,,, | Performed by: INTERNAL MEDICINE

## 2023-04-13 PROCEDURE — 3078F PR MOST RECENT DIASTOLIC BLOOD PRESSURE < 80 MM HG: ICD-10-PCS | Mod: CPTII,,, | Performed by: INTERNAL MEDICINE

## 2023-04-13 PROCEDURE — 99215 OFFICE O/P EST HI 40 MIN: CPT | Mod: ,,, | Performed by: INTERNAL MEDICINE

## 2023-04-13 NOTE — PROGRESS NOTES
Referring provider:  Dr. Burgess  Reason for consultation:  Triple negative right breast cancer     HPI  Patient is a 37-year-old with no significant medical history who noticed a mass right breast in November 2022 which led to further workup including mammogram, ultrasound and biopsy, pathology from biopsy in January 2023 revealed triple negative breast cancer, grade 3.  Patient had further workup including a PET-CT with Dr. Burgess not reveal any distant metastatic disease.  She presented to our clinic on 01/23/2023 to establish care for further disease management.    Interval History:   Today 3/30 /23 ,Patient is doing well overall, she states that she tolerated her last treatment well.  Denies any chest pain, shortness of breath or rash. Denies  fevers, chills, decreased appetite. She does have constipation but is not taking her linzess.  Denies n/v/d. She denies bleeding, unintentional weight loss or night sweats   April 5, 2023  OV.  Mrs. Cordoba, comes in to continue neoadjuvant chemotherapy with carbo platinum and Taxol with pembrolizumab.  She is asymptomatic.  However her ALT and AST are both elevated a 4 and 5 fold, that remain elevated after a trial of steroids.  She is on approximately her 3rd course, with a complete clinical response or disappearance on the of the mass in the right breast.  She does not have a rash, and is essentially asymptomatic except for the elevation of the liver enzymes associated with the pembrolizumab.    April 13 2023, follow up visit.  Ms. Cordoba, came to the clinic today to continue neoadjuvant chemotherapy for a triple negative breast cancer, with carbo platinum, and Taxol weekly and pembrolizumab q. 3 weeks.  We have been holding the pembrolizumab because of transaminitis, consider mild-to-moderate fatigue enzymes remain elevated.  The patient was very concerned about holding the pembrolizumab and she requested to speak with Dr. Nelson .  Unsure of of the cause of the liver  enzymes elevation, we will tested for hepatitis today, and because of neutropenia we are going to hold everything today, carbo platinum Taxol and pembrolizumab, and reschedule her for next .       Review of Systems   Constitutional: Negative.    Eyes: Negative.    Respiratory: Negative.  Negative for cough and shortness of breath.    Cardiovascular: Negative.  Negative for chest pain.   Gastrointestinal:  Positive for constipation and heartburn. Negative for abdominal pain and diarrhea.   Genitourinary: Negative.  Negative for frequency.   Musculoskeletal: Negative.  Negative for back pain.   Skin: Negative.  Negative for rash.   Neurological:  Positive for tingling. Negative for headaches.   Psychiatric/Behavioral: Negative.  The patient is not nervous/anxious.         Social history:   Patient denies any history of smoking, alcohol use or recreational drug use.  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  She has an ECOG of 0.  She has a mother and sister accompanied to her initial visit.  Family history of lung cancer in her grandfather and liver cancer in 1 of her uncles.  No history of breast cancer in the family.      LABS / IMAGING        Pathology  2023 right breast core biopsy and right axillary lymph node core biopsy:  Invasive ductal carcinoma, grade 3, right axillary lymph node biopsy with fragments of poorly differentiated malignant tumor consistent with ductal carcinoma.  Normal lymph node identified.  ER negative, WI negative, HER2 IHC 0, Ki-67 85.3%.    Imagin2022 echo EF 60%     01/10/2023 PET-CT:  Hypermetabolic mass in the superior right breast in keeping with the known cancer popliteal additional areas of mild-to-moderate uptake in the superior and retroareolar right breast are understood with definite discrete mass on CT but worrisome for infiltrative breast cancer.  Multiple hypermetabolic right axillary  subpectoral and right internal mammary lymph nodes consistent with metastatic disease.  No other suspicious uptake.  Right breast mass measures 3 by 2.8 cm with an SUV of 13.7.  Inferior to the mass there was mild diffuse uptake associated with non-mass like dense breast tissue with an SUV of 4.0.  There is also abnormal uptake in the retroareolar right breast with definitive discrete mass can not be  from the dense glandular tissue with an SUV of 5.7 and measuring 1.5 x 1.1 cm.  These are concerning for infiltrative malignancies.    Laboratory CBC and CMP obtained on April 4, 2023 reveals an elevated AST at 1:14 a.m. and ALT at 2:39 a.m., consider moderate hepatotoxicity most likely associated with the immunotherapy.  The CBC shows a hemoglobin of 10 g, hematocrit 30% WBC 3000, platelets 186.  Neutrophil count is 550.        Past Medical History:   Diagnosis Date    Breast cancer     GERD (gastroesophageal reflux disease)      Past Surgical History:   Procedure Laterality Date    ABLATION      MEDIPORT INSERTION, DOUBLE Left 01/26/2023    TUBAL LIGATION         Family History   Problem Relation Age of Onset    Diabetes Maternal Grandmother     Lung cancer Maternal Grandfather     Liver cancer Maternal Uncle        Social History     Socioeconomic History    Marital status: Single   Tobacco Use    Smoking status: Never    Smokeless tobacco: Never   Substance and Sexual Activity    Alcohol use: Yes     Comment: occasionally    Drug use: Never    Sexual activity: Yes     Partners: Male       Current Outpatient Medications   Medication Sig Dispense Refill    boric acid (PH-D) 600 mg vaginal suppository INSERT 1 SUPPOSITORY VAGINALLY DAILY AS NEEDED      dexAMETHasone (DECADRON) 4 MG Tab Take 5 tablets by mouth at 12 hours and 6 hours prior to chemotherapy. 10 tablet 5    hydrOXYzine pamoate (VISTARIL) 25 MG Cap Take 1 capsule (25 mg total) by mouth every 8 (eight) hours as needed (itching). 30 capsule 1     "lactulose (CHRONULAC) 10 gram/15 mL solution SMARTSIG:Milliliter(s) By Mouth      LINZESS 290 mcg Cap capsule Take 290 mcg by mouth.      ondansetron (ZOFRAN) 8 MG tablet Take 1 tablet (8 mg total) by mouth every 8 (eight) hours as needed for Nausea. 30 tablet 1    predniSONE (DELTASONE) 20 MG tablet Take 3 (20 mg) tablets by mouth daily for 5 days (Patient not taking: Reported on 4/5/2023) 15 tablet 0    promethazine (PHENERGAN) 12.5 MG Tab Take 1 tablet (12.5 mg total) by mouth every 4 (four) hours as needed. 60 tablet 1    traMADoL (ULTRAM) 50 mg tablet Take 50 mg by mouth every 4 (four) hours.       No current facility-administered medications for this visit.     Review of patient's allergies indicates:  No Known Allergies           Blood pressure 115/79, pulse 103, temperature 98 °F (36.7 °C), temperature source Oral, resp. rate 20, height 5' 6" (1.676 m), weight 95.3 kg (210 lb), last menstrual period 02/02/2023, SpO2 99 %.   Physical Exam:   Last menstrual period 02/02/2023.   ECOG PS 0  GA: AAOx3, NAD  HEENT: NCAT, no tenderness to head with palpation.  PERRL, EOMI, good dentition, moist oral mucous membranes. One superficial oral ulcer mid region of upper gum line. No tenderness per patient report.   LYMPH: no cervical, axillary or supraclavicular adenopathy  CVS: s1s2 RRR, no M/R/G  RESP: CTA b/l, no crackles, no wheezes or rhonchi  BREAST:  Right breast without evidence of an obvious mass, nodularity in the upper outer quadrant felt on deep palpation, with vague borders.  No adenopathy appreciated on my exam  ABD: soft, NT, ND, BS+, no hepatosplenomegaly  EXT: no deformities, no pedal edema  SKIN: no rashes, warm and dry  NEURO: normal mentation, strength 5/5 on all 4 extremities, no sensory deficits  Bilateral breast exam.  the breasts were pendulous, however there was no skin changes, the original primary in the upper inner quadrant of the right breast was not palpable.  There was no areola or nipple " inversion or changes.    Assessment:       # Right breast invasive ductal carcinoma, triple negative  ER negative, NV negative, HER2 IHC 0, Ki-67 85%   [id number], stage IIIC   Discussed with patient the diagnosis of triple negative breast cancer, treatment recommendations including neoadjuvant chemotherapy followed by surgery followed by adjuvant chemotherapy + radiation therapy   Reviewed PET-CT without evidence of distant metastatic disease.  Will plan for treatment with neoadjuvant carbo Taxol pembrolizumab followed by AC pembrolizumab prior to surgical resection   S/p MRI bilateral breasts   Patient is not interested in fertility preservation  She is status post tubal ligation, discuss the need for dual contraception while on chemotherapy.  Baseline Echo 60%  S/p port placement and chemo ed  Genetic counseling done, test results pending      Plan:     Hold  treatment today, given the presence of an ANC less than 500.  She was sent to the outpatient lab ,for hepatitis testing , A, B, and C.   She will return to the clinic on Monday, April 17 to see Dr. Nelson, to be sure her ANC is above 500, and her transaminitis, are stable or improved.  Today's visit lasted over half an hour, trying to identify the cause of the elevated liver enzymes and to treat her not to treat her.         Paco Hatfield MD

## 2023-04-17 DIAGNOSIS — C50.919 MALIGNANT NEOPLASM OF FEMALE BREAST, UNSPECIFIED ESTROGEN RECEPTOR STATUS, UNSPECIFIED LATERALITY, UNSPECIFIED SITE OF BREAST: Primary | ICD-10-CM

## 2023-04-17 DIAGNOSIS — R74.01 TRANSAMINITIS: ICD-10-CM

## 2023-04-17 RX ORDER — SODIUM CHLORIDE 0.9 % (FLUSH) 0.9 %
10 SYRINGE (ML) INJECTION
Status: CANCELLED | OUTPATIENT
Start: 2023-04-17

## 2023-04-17 RX ORDER — HEPARIN 100 UNIT/ML
500 SYRINGE INTRAVENOUS
Status: CANCELLED | OUTPATIENT
Start: 2023-04-17

## 2023-04-17 RX ORDER — EPINEPHRINE 0.3 MG/.3ML
0.3 INJECTION SUBCUTANEOUS ONCE AS NEEDED
Status: CANCELLED | OUTPATIENT
Start: 2023-04-17

## 2023-04-17 RX ORDER — FAMOTIDINE 10 MG/ML
20 INJECTION INTRAVENOUS
Status: CANCELLED | OUTPATIENT
Start: 2023-04-17

## 2023-04-17 RX ORDER — DIPHENHYDRAMINE HYDROCHLORIDE 50 MG/ML
50 INJECTION INTRAMUSCULAR; INTRAVENOUS ONCE AS NEEDED
Status: CANCELLED | OUTPATIENT
Start: 2023-04-17

## 2023-04-25 ENCOUNTER — OFFICE VISIT (OUTPATIENT)
Dept: HEMATOLOGY/ONCOLOGY | Facility: CLINIC | Age: 38
End: 2023-04-25
Payer: MEDICAID

## 2023-04-25 VITALS
RESPIRATION RATE: 18 BRPM | HEART RATE: 86 BPM | WEIGHT: 210 LBS | DIASTOLIC BLOOD PRESSURE: 72 MMHG | TEMPERATURE: 99 F | BODY MASS INDEX: 41.23 KG/M2 | OXYGEN SATURATION: 100 % | HEIGHT: 60 IN | SYSTOLIC BLOOD PRESSURE: 106 MMHG

## 2023-04-25 DIAGNOSIS — C50.411 MALIGNANT NEOPLASM OF UPPER-OUTER QUADRANT OF RIGHT BREAST IN FEMALE, ESTROGEN RECEPTOR NEGATIVE: Primary | ICD-10-CM

## 2023-04-25 DIAGNOSIS — Z51.11 ENCOUNTER FOR ANTINEOPLASTIC CHEMOTHERAPY: ICD-10-CM

## 2023-04-25 DIAGNOSIS — Z17.1 MALIGNANT NEOPLASM OF UPPER-OUTER QUADRANT OF RIGHT BREAST IN FEMALE, ESTROGEN RECEPTOR NEGATIVE: Primary | ICD-10-CM

## 2023-04-25 DIAGNOSIS — R74.01 TRANSAMINITIS: ICD-10-CM

## 2023-04-25 PROCEDURE — 3008F BODY MASS INDEX DOCD: CPT | Mod: CPTII,,, | Performed by: STUDENT IN AN ORGANIZED HEALTH CARE EDUCATION/TRAINING PROGRAM

## 2023-04-25 PROCEDURE — 3074F PR MOST RECENT SYSTOLIC BLOOD PRESSURE < 130 MM HG: ICD-10-PCS | Mod: CPTII,,, | Performed by: STUDENT IN AN ORGANIZED HEALTH CARE EDUCATION/TRAINING PROGRAM

## 2023-04-25 PROCEDURE — 1159F PR MEDICATION LIST DOCUMENTED IN MEDICAL RECORD: ICD-10-PCS | Mod: CPTII,,, | Performed by: STUDENT IN AN ORGANIZED HEALTH CARE EDUCATION/TRAINING PROGRAM

## 2023-04-25 PROCEDURE — 1159F MED LIST DOCD IN RCRD: CPT | Mod: CPTII,,, | Performed by: STUDENT IN AN ORGANIZED HEALTH CARE EDUCATION/TRAINING PROGRAM

## 2023-04-25 PROCEDURE — 3078F PR MOST RECENT DIASTOLIC BLOOD PRESSURE < 80 MM HG: ICD-10-PCS | Mod: CPTII,,, | Performed by: STUDENT IN AN ORGANIZED HEALTH CARE EDUCATION/TRAINING PROGRAM

## 2023-04-25 PROCEDURE — 3074F SYST BP LT 130 MM HG: CPT | Mod: CPTII,,, | Performed by: STUDENT IN AN ORGANIZED HEALTH CARE EDUCATION/TRAINING PROGRAM

## 2023-04-25 PROCEDURE — 99215 OFFICE O/P EST HI 40 MIN: CPT | Mod: ,,, | Performed by: STUDENT IN AN ORGANIZED HEALTH CARE EDUCATION/TRAINING PROGRAM

## 2023-04-25 PROCEDURE — 3078F DIAST BP <80 MM HG: CPT | Mod: CPTII,,, | Performed by: STUDENT IN AN ORGANIZED HEALTH CARE EDUCATION/TRAINING PROGRAM

## 2023-04-25 PROCEDURE — 99215 PR OFFICE/OUTPT VISIT, EST, LEVL V, 40-54 MIN: ICD-10-PCS | Mod: ,,, | Performed by: STUDENT IN AN ORGANIZED HEALTH CARE EDUCATION/TRAINING PROGRAM

## 2023-04-25 PROCEDURE — 3008F PR BODY MASS INDEX (BMI) DOCUMENTED: ICD-10-PCS | Mod: CPTII,,, | Performed by: STUDENT IN AN ORGANIZED HEALTH CARE EDUCATION/TRAINING PROGRAM

## 2023-04-25 RX ORDER — EPINEPHRINE 0.3 MG/.3ML
0.3 INJECTION SUBCUTANEOUS ONCE AS NEEDED
Status: CANCELLED | OUTPATIENT
Start: 2023-04-25

## 2023-04-25 RX ORDER — FAMOTIDINE 10 MG/ML
20 INJECTION INTRAVENOUS
Status: CANCELLED | OUTPATIENT
Start: 2023-04-25

## 2023-04-25 RX ORDER — SODIUM CHLORIDE 0.9 % (FLUSH) 0.9 %
10 SYRINGE (ML) INJECTION
Status: CANCELLED | OUTPATIENT
Start: 2023-04-25

## 2023-04-25 RX ORDER — DIPHENHYDRAMINE HYDROCHLORIDE 50 MG/ML
50 INJECTION INTRAMUSCULAR; INTRAVENOUS ONCE AS NEEDED
Status: CANCELLED | OUTPATIENT
Start: 2023-04-25

## 2023-04-25 RX ORDER — HEPARIN 100 UNIT/ML
500 SYRINGE INTRAVENOUS
Status: CANCELLED | OUTPATIENT
Start: 2023-04-25

## 2023-04-25 NOTE — PROGRESS NOTES
Referring provider:  Dr. Burgess  Reason for consultation:  Triple negative right breast cancer     Diagnosis :   Right breast invasive ductal carcinoma, triple negative  ER negative, [place] negative, HER2 IHC 0, Ki-67 85%   [id number], stage IIIC       Current Treatment  02/02/2023-current:  Weekly Carbo Taxol plus q. 3 weeks pembrolizumab      HPI  Patient is a 37-year-old with no significant medical history who noticed a mass right breast in November 2022 which led to further workup including mammogram, ultrasound and biopsy, pathology from biopsy in January 2023 revealed triple negative breast cancer, grade 3.  Patient had further workup including a PET-CT with Dr. Burgess not reveal any distant metastatic disease.  She presented to our clinic on 01/23/2023 to establish care for further disease management.      Patient denies any history of smoking, alcohol use or recreational drug use.  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  She has an ECOG of 0.  She has a mother and sister accompanied to her initial visit.  Family history of lung cancer in her grandfather and liver cancer in 1 of her uncles.  No history of breast cancer in the family.    Interval History:     04/20/2023, patient reports symptoms of itching after last cycle of treatment which resolved with Atarax.  She denies any hives, swelling of throat or tongue.  She reports tolerating her treatment well otherwise.  She denies any fevers, chills, nausea vomiting.  She reports a fair appetite.  She does report mild tingling numbness which is not interfering with activities of daily living.       LABS / IMAGING        Pathology  01/03/2023 right breast core biopsy and right axillary lymph node core biopsy:  Invasive ductal carcinoma, grade 3, right axillary lymph node biopsy with fragments of poorly differentiated malignant tumor consistent with ductal carcinoma.  Normal lymph node identified.  ER negative, [place]  negative, HER2 IHC 0, Ki-67 85.3%.    Imagin2023 CT abdomen pelvis with contrast:  No evidence of neoplastic disease, small umbilical hernia.    2022 echo EF 60%     01/10/2023 PET-CT:  Hypermetabolic mass in the superior right breast in keeping with the known cancer popliteal additional areas of mild-to-moderate uptake in the superior and retroareolar right breast are understood with definite discrete mass on CT but worrisome for infiltrative breast cancer.  Multiple hypermetabolic right axillary subpectoral and right internal mammary lymph nodes consistent with metastatic disease.  No other suspicious uptake.  Right breast mass measures 3 by 2.8 cm with an SUV of 13.7.  Inferior to the mass there was mild diffuse uptake associated with non-mass like dense breast tissue with an SUV of 4.0.  There is also abnormal uptake in the retroareolar right breast with definitive discrete mass can not be  from the dense glandular tissue with an SUV of 5.7 and measuring 1.5 x 1.1 cm.  These are concerning for infiltrative malignancies.    Laboratory   2023:  Creatinine 0.8, albumin 3.6, alkaline phosphatase 77, total bili 0.4, AST 39, ALT 87, magnesium 1.7, phosphorus 3.3, WBC count 9.6, ANC 4.9, platelet count 196, hemoglobin 9.6.       Past Medical History:   Diagnosis Date    Breast cancer     GERD (gastroesophageal reflux disease)      Past Surgical History:   Procedure Laterality Date    ABLATION      MEDIPORT INSERTION, DOUBLE Left 2023    TUBAL LIGATION         Family History   Problem Relation Age of Onset    Diabetes Maternal Grandmother     Lung cancer Maternal Grandfather     Liver cancer Maternal Uncle        Social History     Socioeconomic History    Marital status: Single   Tobacco Use    Smoking status: Never    Smokeless tobacco: Never   Substance and Sexual Activity    Alcohol use: Yes     Comment: occasionally    Drug use: Never    Sexual activity: Yes     Partners:  Male       Current Outpatient Medications   Medication Sig Dispense Refill    boric acid (PH-D) 600 mg vaginal suppository INSERT 1 SUPPOSITORY VAGINALLY DAILY AS NEEDED      dexAMETHasone (DECADRON) 4 MG Tab Take 5 tablets by mouth at 12 hours and 6 hours prior to chemotherapy. 10 tablet 5    hydrOXYzine pamoate (VISTARIL) 25 MG Cap Take 1 capsule (25 mg total) by mouth every 8 (eight) hours as needed (itching). 30 capsule 1    lactulose (CHRONULAC) 10 gram/15 mL solution SMARTSIG:Milliliter(s) By Mouth      LINZESS 290 mcg Cap capsule Take 290 mcg by mouth.      ondansetron (ZOFRAN) 8 MG tablet Take 1 tablet (8 mg total) by mouth every 8 (eight) hours as needed for Nausea. 30 tablet 1    predniSONE (DELTASONE) 20 MG tablet Take 3 (20 mg) tablets by mouth daily for 5 days 15 tablet 0    promethazine (PHENERGAN) 12.5 MG Tab Take 1 tablet (12.5 mg total) by mouth every 4 (four) hours as needed. 60 tablet 1    traMADoL (ULTRAM) 50 mg tablet Take 50 mg by mouth every 4 (four) hours.       No current facility-administered medications for this visit.     Review of patient's allergies indicates:  No Known Allergies           Blood pressure 106/72, pulse 86, temperature 98.8 °F (37.1 °C), temperature source Oral, resp. rate 18, height 5' (1.524 m), weight 95.3 kg (210 lb), last menstrual period 02/02/2023, SpO2 100 %.   Physical Exam:   Blood pressure 106/72, pulse 86, temperature 98.8 °F (37.1 °C), temperature source Oral, resp. rate 18, height 5' (1.524 m), weight 95.3 kg (210 lb), last menstrual period 02/02/2023, SpO2 100 %.   ECOG PS 0  GA: AAOx3, NAD  HEENT: NCAT, no tenderness to head with palpation.  PERRL, EOMI, good dentition, moist oral mucous membranes. One superficial oral ulcer mid region of upper gum line. No tenderness per patient report.   LYMPH: no cervical, axillary or supraclavicular adenopathy  CVS: s1s2 RRR, no M/R/G  RESP: CTA b/l, no crackles, no wheezes or rhonchi  BREAST:  Deferred  ABD: soft, NT,  ND, BS+, no hepatosplenomegaly  EXT: no deformities, no pedal edema  SKIN: no rashes, warm and dry  NEURO: normal mentation, strength 5/5 on all 4 extremities, no sensory deficits      Assessment:       # Right breast invasive ductal carcinoma, triple negative  ER negative, [place] negative, HER2 IHC 0, Ki-67 85%   [id number], stage IIIC   Discussed with patient the diagnosis of triple negative breast cancer, treatment recommendations including neoadjuvant chemotherapy followed by surgery followed by adjuvant chemotherapy + radiation therapy   Reviewed PET-CT without evidence of distant metastatic disease.  Will plan for treatment with neoadjuvant carbo Taxol pembrolizumab followed by AC pembrolizumab prior to surgical resection   S/p MRI bilateral breasts   Patient is not interested in fertility preservation  She is status post tubal ligation, discuss the need for dual contraception while on chemotherapy.  Baseline Echo 60%  S/p port placement and chemo ed  Genetic counseling done, will follow results.      Plan:     Reviewed labs, noted improvement in patient's transaminitis despite her getting Keytruda last week.  Hepatitis panel and HIV negative.  Reviewed CT abdomen pelvis with contrast without evidence of metastatic disease.  Proceed with cycle 4 day 15 carbo Taxol today   Plan for cycle 5 day 1 AC next week.  Labs day prior.  Patient's pembrolizumab will not coincide with cycle 5 day 1 of AC since she got it with cycle 4 day 8.       Portions of the record may have been created with voice recognition software. Occasional wrong-word or sound-a-like substitutions may have occurred due to the inherent limitations of voice recognition software. Read the chart carefully and recognize, using context, where substitutions have occurred.

## 2023-05-02 ENCOUNTER — OFFICE VISIT (OUTPATIENT)
Dept: HEMATOLOGY/ONCOLOGY | Facility: CLINIC | Age: 38
End: 2023-05-02
Payer: MEDICAID

## 2023-05-02 VITALS
RESPIRATION RATE: 18 BRPM | WEIGHT: 210.31 LBS | HEART RATE: 86 BPM | HEIGHT: 60 IN | OXYGEN SATURATION: 98 % | SYSTOLIC BLOOD PRESSURE: 120 MMHG | DIASTOLIC BLOOD PRESSURE: 79 MMHG | TEMPERATURE: 98 F | BODY MASS INDEX: 41.29 KG/M2

## 2023-05-02 DIAGNOSIS — Z17.1 MALIGNANT NEOPLASM OF UPPER-OUTER QUADRANT OF RIGHT BREAST IN FEMALE, ESTROGEN RECEPTOR NEGATIVE: Primary | ICD-10-CM

## 2023-05-02 DIAGNOSIS — C50.411 MALIGNANT NEOPLASM OF UPPER-OUTER QUADRANT OF RIGHT BREAST IN FEMALE, ESTROGEN RECEPTOR NEGATIVE: Primary | ICD-10-CM

## 2023-05-02 PROCEDURE — 3008F BODY MASS INDEX DOCD: CPT | Mod: CPTII,,,

## 2023-05-02 PROCEDURE — 1159F MED LIST DOCD IN RCRD: CPT | Mod: CPTII,,,

## 2023-05-02 PROCEDURE — 3078F PR MOST RECENT DIASTOLIC BLOOD PRESSURE < 80 MM HG: ICD-10-PCS | Mod: CPTII,,,

## 2023-05-02 PROCEDURE — 99215 PR OFFICE/OUTPT VISIT, EST, LEVL V, 40-54 MIN: ICD-10-PCS | Mod: ,,,

## 2023-05-02 PROCEDURE — 99215 OFFICE O/P EST HI 40 MIN: CPT | Mod: ,,,

## 2023-05-02 PROCEDURE — 3008F PR BODY MASS INDEX (BMI) DOCUMENTED: ICD-10-PCS | Mod: CPTII,,,

## 2023-05-02 PROCEDURE — 3074F SYST BP LT 130 MM HG: CPT | Mod: CPTII,,,

## 2023-05-02 PROCEDURE — 3074F PR MOST RECENT SYSTOLIC BLOOD PRESSURE < 130 MM HG: ICD-10-PCS | Mod: CPTII,,,

## 2023-05-02 PROCEDURE — 3078F DIAST BP <80 MM HG: CPT | Mod: CPTII,,,

## 2023-05-02 PROCEDURE — 1159F PR MEDICATION LIST DOCUMENTED IN MEDICAL RECORD: ICD-10-PCS | Mod: CPTII,,,

## 2023-05-02 RX ORDER — HEPARIN 100 UNIT/ML
500 SYRINGE INTRAVENOUS
Status: CANCELLED | OUTPATIENT
Start: 2023-05-02

## 2023-05-02 RX ORDER — DOXORUBICIN HYDROCHLORIDE 2 MG/ML
60 INJECTION, SOLUTION INTRAVENOUS
Status: CANCELLED | OUTPATIENT
Start: 2023-05-02

## 2023-05-02 RX ORDER — SODIUM CHLORIDE 0.9 % (FLUSH) 0.9 %
10 SYRINGE (ML) INJECTION
Status: CANCELLED | OUTPATIENT
Start: 2023-05-02

## 2023-05-02 RX ORDER — EPINEPHRINE 0.3 MG/.3ML
0.3 INJECTION SUBCUTANEOUS ONCE AS NEEDED
Status: CANCELLED | OUTPATIENT
Start: 2023-05-02

## 2023-05-02 RX ORDER — DIPHENHYDRAMINE HYDROCHLORIDE 50 MG/ML
50 INJECTION INTRAMUSCULAR; INTRAVENOUS ONCE AS NEEDED
Status: CANCELLED | OUTPATIENT
Start: 2023-05-02

## 2023-05-02 NOTE — PROGRESS NOTES
Referring provider:  Dr. Burgess  Reason for consultation:  Triple negative right breast cancer     Diagnosis :   Right breast invasive ductal carcinoma, triple negative  ER negative, [place] negative, HER2 IHC 0, Ki-67 85%   [id number], stage IIIC       Current Treatment  02/02/2023-current:  Weekly Carbo Taxol plus q. 3 weeks pembrolizumab      HPI  Patient is a 37-year-old with no significant medical history who noticed a mass right breast in November 2022 which led to further workup including mammogram, ultrasound and biopsy, pathology from biopsy in January 2023 revealed triple negative breast cancer, grade 3.  Patient had further workup including a PET-CT with Dr. Burgess not reveal any distant metastatic disease.  She presented to our clinic on 01/23/2023 to establish care for further disease management.      Patient denies any history of smoking, alcohol use or recreational drug use.  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  She has an ECOG of 0.  She has a mother and sister accompanied to her initial visit.  Family history of lung cancer in her grandfather and liver cancer in 1 of her uncles.  No history of breast cancer in the family.    Interval History:     05/02/2023, patient is doing well.   She denies any hives, swelling of throat or tongue.  She reports tolerating her treatment well otherwise.  She denies any fevers, chills, nausea, vomiting, or abdominal pain.  She reports a fair appetite.  She continues with mild tingling numbness which is not interfering with activities of daily living.       LABS / IMAGING        Pathology  01/03/2023 right breast core biopsy and right axillary lymph node core biopsy:  Invasive ductal carcinoma, grade 3, right axillary lymph node biopsy with fragments of poorly differentiated malignant tumor consistent with ductal carcinoma.  Normal lymph node identified.  ER negative, MI negative, HER2 IHC 0, Ki-67 85.3%.    Imaging:      04/24/2023 CT abdomen pelvis with contrast:  No evidence of neoplastic disease, small umbilical hernia.    01/27/2022 echo EF 60%     01/10/2023 PET-CT:  Hypermetabolic mass in the superior right breast in keeping with the known cancer popliteal additional areas of mild-to-moderate uptake in the superior and retroareolar right breast are understood with definite discrete mass on CT but worrisome for infiltrative breast cancer.  Multiple hypermetabolic right axillary subpectoral and right internal mammary lymph nodes consistent with metastatic disease.  No other suspicious uptake.  Right breast mass measures 3 by 2.8 cm with an SUV of 13.7.  Inferior to the mass there was mild diffuse uptake associated with non-mass like dense breast tissue with an SUV of 4.0.  There is also abnormal uptake in the retroareolar right breast with definitive discrete mass can not be  from the dense glandular tissue with an SUV of 5.7 and measuring 1.5 x 1.1 cm.  These are concerning for infiltrative malignancies.    Laboratory   05/01/23: Cr 0.85, alb 3.8, ca 9.3, AST 45, ALT 70, mag 1.7, phosphorus 3.6, wbc 4.85, hgb 10.2, plt 197, ANC 2.20  04/24/2023:  Creatinine 0.8, albumin 3.6, alkaline phosphatase 77, total bili 0.4, AST 39, ALT 87, magnesium 1.7, phosphorus 3.3, WBC count 9.6, ANC 4.9, platelet count 196, hemoglobin 9.6.  3/23/23: cr 0.79 , Mg 1.7 TSH 1.8  AST 32  wbc 7.92 hgb 10.0 Plt 215  03/15/23: cr 0.81, TB 0.7, mag 2.0, TSH 0.7533, cortisol 17.9, , , wbc 4.62, hgb 11.1, plt 238, ANC 1.89, UPT neg  03/08/2023:  Creatinine 0.83, AST 73, , bilirubin 0.5, TSH 0.75, WBC 5.7, hemoglobin 11, platelets 222  03/02/2023:  WBC count 5.17, hemoglobin 10.2, MCV 91.1, platelet count 265, ANC 1.89, urine pregnancy negative, creatinine 0.9, albu3.5, ca 9.2, alkaline phosphatase 76, total bilirubin 0.3, AST 53, , magnesium 1.8, phosphorus 2.7, TSH 0.9.  02/22/2023: WBC 3.02, Hgb 10.9, MCV  88.0, , ANC 1.4 3, UPT negative, creatinine 0.82 albumin 3.7 magnesium 2.1 phosphorus 2.1, TSH 0.6540, cortisol 8.7  02/15/23 Cr 0.83, alb 3.7, LFTs WNL, mag 1.7, phosphorus 2.0, TSH 0.4434, cortisol 10.6, wbc 3.32, hgb 11.3, plt 266, ANC 1.42 UPT negative   02/08/23: cr 0.79, alb 3.5, ca 9.3, AST 36, ALT 45, mag 1.9, phosphorus 2.1, wbc 4.47, hgb 11.1, plt 264, ANC 2.28  02/01/2023:  Creatinine 0.83, albumin 3.7, LFTs within normal limits, TSH 1.09, cortisol 8.8, free T4 0.98, WBC 7.7, Hb 11.5, MCV 87.9, platelet count 283, ANC 4.09, urine pregnancy negative.    Past Medical History:   Diagnosis Date    Breast cancer     GERD (gastroesophageal reflux disease)      Past Surgical History:   Procedure Laterality Date    ABLATION      MEDIPORT INSERTION, DOUBLE Left 01/26/2023    TUBAL LIGATION         Family History   Problem Relation Age of Onset    Diabetes Maternal Grandmother     Lung cancer Maternal Grandfather     Liver cancer Maternal Uncle        Social History     Socioeconomic History    Marital status: Single   Tobacco Use    Smoking status: Never    Smokeless tobacco: Never   Substance and Sexual Activity    Alcohol use: Yes     Comment: occasionally    Drug use: Never    Sexual activity: Yes     Partners: Male       Current Outpatient Medications   Medication Sig Dispense Refill    boric acid (PH-D) 600 mg vaginal suppository INSERT 1 SUPPOSITORY VAGINALLY DAILY AS NEEDED      dexAMETHasone (DECADRON) 4 MG Tab Take 5 tablets by mouth at 12 hours and 6 hours prior to chemotherapy. 10 tablet 5    hydrOXYzine pamoate (VISTARIL) 25 MG Cap Take 1 capsule (25 mg total) by mouth every 8 (eight) hours as needed (itching). 30 capsule 1    lactulose (CHRONULAC) 10 gram/15 mL solution SMARTSIG:Milliliter(s) By Mouth      LINZESS 290 mcg Cap capsule Take 290 mcg by mouth.      ondansetron (ZOFRAN) 8 MG tablet Take 1 tablet (8 mg total) by mouth every 8 (eight) hours as needed for Nausea. 30 tablet 1     predniSONE (DELTASONE) 20 MG tablet Take 3 (20 mg) tablets by mouth daily for 5 days 15 tablet 0    promethazine (PHENERGAN) 12.5 MG Tab Take 1 tablet (12.5 mg total) by mouth every 4 (four) hours as needed. 60 tablet 1    traMADoL (ULTRAM) 50 mg tablet Take 50 mg by mouth every 4 (four) hours.       No current facility-administered medications for this visit.     Review of patient's allergies indicates:  No Known Allergies         Physical Exam:   Blood pressure 120/79, pulse 86, temperature 98.2 °F (36.8 °C), temperature source Oral, resp. rate 18, height 5' (1.524 m), weight 95.4 kg (210 lb 4.8 oz), last menstrual period 02/02/2023, SpO2 98 %.   ECOG PS 0  GA: AAOx3, NAD  HEENT: NCAT, no tenderness to head with palpation.  PERRL, EOMI, good dentition, moist oral mucous membranes. One superficial oral ulcer mid region of upper gum line. No tenderness per patient report.   LYMPH: no cervical, axillary or supraclavicular adenopathy  CVS: s1s2 RRR, no M/R/G  RESP: CTA b/l, no crackles, no wheezes or rhonchi  BREAST:  Deferred  ABD: soft, NT, ND, BS+, no hepatosplenomegaly  EXT: no deformities, no pedal edema  SKIN: no rashes, warm and dry  NEURO: normal mentation, strength 5/5 on all 4 extremities, no sensory deficits      Assessment:       # Right breast invasive ductal carcinoma, triple negative  ER negative, [place] negative, HER2 IHC 0, Ki-67 85%   [id number], stage IIIC   Discussed with patient the diagnosis of triple negative breast cancer, treatment recommendations including neoadjuvant chemotherapy followed by surgery followed by adjuvant chemotherapy + radiation therapy   Reviewed PET-CT without evidence of distant metastatic disease.  Will plan for treatment with neoadjuvant carbo Taxol pembrolizumab followed by AC pembrolizumab prior to surgical resection   S/p MRI bilateral breasts   Patient is not interested in fertility preservation  She is status post tubal ligation, discuss the need for dual contraception  while on chemotherapy.  Baseline Echo 60%  S/p port placement and chemo ed  Genetic counseling done, will follow results.      Plan:     Reviewed labs, transaminitis stable. Hepatitis panel and HIV negative.  Proceed with cycle 5 day 1 AC today   Follow-up next week for pembrolizumab infusion w/ labs prior  Plan for cycle 6 day 1 AC 3 weeks.  Labs day prior.

## 2023-05-23 ENCOUNTER — OFFICE VISIT (OUTPATIENT)
Dept: HEMATOLOGY/ONCOLOGY | Facility: CLINIC | Age: 38
End: 2023-05-23
Payer: MEDICAID

## 2023-05-23 VITALS
RESPIRATION RATE: 20 BRPM | HEIGHT: 66 IN | BODY MASS INDEX: 33.25 KG/M2 | DIASTOLIC BLOOD PRESSURE: 75 MMHG | HEART RATE: 92 BPM | WEIGHT: 206.88 LBS | SYSTOLIC BLOOD PRESSURE: 113 MMHG | TEMPERATURE: 98 F | OXYGEN SATURATION: 100 %

## 2023-05-23 DIAGNOSIS — C50.411 MALIGNANT NEOPLASM OF UPPER-OUTER QUADRANT OF RIGHT BREAST IN FEMALE, ESTROGEN RECEPTOR NEGATIVE: Primary | ICD-10-CM

## 2023-05-23 DIAGNOSIS — Z17.1 MALIGNANT NEOPLASM OF UPPER-OUTER QUADRANT OF RIGHT BREAST IN FEMALE, ESTROGEN RECEPTOR NEGATIVE: Primary | ICD-10-CM

## 2023-05-23 PROCEDURE — 3074F PR MOST RECENT SYSTOLIC BLOOD PRESSURE < 130 MM HG: ICD-10-PCS | Mod: CPTII,,,

## 2023-05-23 PROCEDURE — 99215 OFFICE O/P EST HI 40 MIN: CPT | Mod: ,,,

## 2023-05-23 PROCEDURE — 3078F DIAST BP <80 MM HG: CPT | Mod: CPTII,,,

## 2023-05-23 PROCEDURE — 1159F MED LIST DOCD IN RCRD: CPT | Mod: CPTII,,,

## 2023-05-23 PROCEDURE — 3008F BODY MASS INDEX DOCD: CPT | Mod: CPTII,,,

## 2023-05-23 PROCEDURE — 3008F PR BODY MASS INDEX (BMI) DOCUMENTED: ICD-10-PCS | Mod: CPTII,,,

## 2023-05-23 PROCEDURE — 1159F PR MEDICATION LIST DOCUMENTED IN MEDICAL RECORD: ICD-10-PCS | Mod: CPTII,,,

## 2023-05-23 PROCEDURE — 99215 PR OFFICE/OUTPT VISIT, EST, LEVL V, 40-54 MIN: ICD-10-PCS | Mod: ,,,

## 2023-05-23 PROCEDURE — 3074F SYST BP LT 130 MM HG: CPT | Mod: CPTII,,,

## 2023-05-23 PROCEDURE — 3078F PR MOST RECENT DIASTOLIC BLOOD PRESSURE < 80 MM HG: ICD-10-PCS | Mod: CPTII,,,

## 2023-05-23 RX ORDER — DIPHENHYDRAMINE HYDROCHLORIDE 50 MG/ML
50 INJECTION INTRAMUSCULAR; INTRAVENOUS ONCE AS NEEDED
Status: CANCELLED | OUTPATIENT
Start: 2023-05-23

## 2023-05-23 RX ORDER — SODIUM CHLORIDE 0.9 % (FLUSH) 0.9 %
10 SYRINGE (ML) INJECTION
Status: CANCELLED | OUTPATIENT
Start: 2023-05-23

## 2023-05-23 RX ORDER — EPINEPHRINE 0.3 MG/.3ML
0.3 INJECTION SUBCUTANEOUS ONCE AS NEEDED
Status: CANCELLED | OUTPATIENT
Start: 2023-05-23

## 2023-05-23 RX ORDER — DOXORUBICIN HYDROCHLORIDE 2 MG/ML
60 INJECTION, SOLUTION INTRAVENOUS
Status: CANCELLED | OUTPATIENT
Start: 2023-05-23

## 2023-05-23 RX ORDER — HEPARIN 100 UNIT/ML
500 SYRINGE INTRAVENOUS
Status: CANCELLED | OUTPATIENT
Start: 2023-05-23

## 2023-05-23 NOTE — PROGRESS NOTES
Referring provider:  Dr. Burgess  Reason for consultation:  Triple negative right breast cancer     Diagnosis :   Right breast invasive ductal carcinoma, triple negative  ER negative, [place] negative, HER2 IHC 0, Ki-67 85%   [id number], stage IIIC       Current Treatment  02/02/2023-current:  Weekly Carbo Taxol plus q. 3 weeks pembrolizumab      HPI  Patient is a 37-year-old with no significant medical history who noticed a mass right breast in November 2022 which led to further workup including mammogram, ultrasound and biopsy, pathology from biopsy in January 2023 revealed triple negative breast cancer, grade 3.  Patient had further workup including a PET-CT with Dr. Burgess not reveal any distant metastatic disease.  She presented to our clinic on 01/23/2023 to establish care for further disease management.      Patient denies any history of smoking, alcohol use or recreational drug use.  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  She has an ECOG of 0.  She has a mother and sister accompanied to her initial visit.  Family history of lung cancer in her grandfather and liver cancer in 1 of her uncles.  No history of breast cancer in the family.    Interval History:     05/23/2023, patient is doing well.   She denies any hives, swelling of throat or tongue.  She reports tolerating her treatment well otherwise.  She denies any fevers, chills, nausea, vomiting, or abdominal pain.  She reports a fair appetite.  She continues with mild tingling numbness which is not interfering with activities of daily living. She has noted finger nail discoloration and mild nail lifting.       LABS / IMAGING        Pathology  01/03/2023 right breast core biopsy and right axillary lymph node core biopsy:  Invasive ductal carcinoma, grade 3, right axillary lymph node biopsy with fragments of poorly differentiated malignant tumor consistent with ductal carcinoma.  Normal lymph node identified.   ER negative, [place] negative, HER2 IHC 0, Ki-67 85.3%.    Imagin2023 CT abdomen pelvis with contrast:  No evidence of neoplastic disease, small umbilical hernia.    2022 echo EF 60%     01/10/2023 PET-CT:  Hypermetabolic mass in the superior right breast in keeping with the known cancer popliteal additional areas of mild-to-moderate uptake in the superior and retroareolar right breast are understood with definite discrete mass on CT but worrisome for infiltrative breast cancer.  Multiple hypermetabolic right axillary subpectoral and right internal mammary lymph nodes consistent with metastatic disease.  No other suspicious uptake.  Right breast mass measures 3 by 2.8 cm with an SUV of 13.7.  Inferior to the mass there was mild diffuse uptake associated with non-mass like dense breast tissue with an SUV of 4.0.  There is also abnormal uptake in the retroareolar right breast with definitive discrete mass can not be  from the dense glandular tissue with an SUV of 5.7 and measuring 1.5 x 1.1 cm.  These are concerning for infiltrative malignancies.    Laboratory   23 cr 0.81, alb 3.8, ca 9.6, LFTs WNL, mag 1.8, phosphorus 3.8, wbc 6.99, hgb 9.6, plt 297, ANC 3.95  23: Cr 0.85, alb 3.8, ca 9.3, AST 45, ALT 70, mag 1.7, phosphorus 3.6, wbc 4.85, hgb 10.2, plt 197, ANC 2.20  2023:  Creatinine 0.8, albumin 3.6, alkaline phosphatase 77, total bili 0.4, AST 39, ALT 87, magnesium 1.7, phosphorus 3.3, WBC count 9.6, ANC 4.9, platelet count 196, hemoglobin 9.6.  3/23/23: cr 0.79 , Mg 1.7 TSH 1.8  AST 32  wbc 7.92 hgb 10.0 Plt 215  03/15/23: cr 0.81, TB 0.7, mag 2.0, TSH 0.7533, cortisol 17.9, , , wbc 4.62, hgb 11.1, plt 238, ANC 1.89, UPT neg  2023:  Creatinine 0.83, AST 73, , bilirubin 0.5, TSH 0.75, WBC 5.7, hemoglobin 11, platelets 222  2023:  WBC count 5.17, hemoglobin 10.2, MCV 91.1, platelet count 265, ANC 1.89, urine pregnancy negative,  creatinine 0.9, albu3.5, ca 9.2, alkaline phosphatase 76, total bilirubin 0.3, AST 53, , magnesium 1.8, phosphorus 2.7, TSH 0.9.  02/22/2023: WBC 3.02, Hgb 10.9, MCV 88.0, , ANC 1.4 3, UPT negative, creatinine 0.82 albumin 3.7 magnesium 2.1 phosphorus 2.1, TSH 0.6540, cortisol 8.7  02/15/23 Cr 0.83, alb 3.7, LFTs WNL, mag 1.7, phosphorus 2.0, TSH 0.4434, cortisol 10.6, wbc 3.32, hgb 11.3, plt 266, ANC 1.42 UPT negative   02/08/23: cr 0.79, alb 3.5, ca 9.3, AST 36, ALT 45, mag 1.9, phosphorus 2.1, wbc 4.47, hgb 11.1, plt 264, ANC 2.28  02/01/2023:  Creatinine 0.83, albumin 3.7, LFTs within normal limits, TSH 1.09, cortisol 8.8, free T4 0.98, WBC 7.7, Hb 11.5, MCV 87.9, platelet count 283, ANC 4.09, urine pregnancy negative.    Past Medical History:   Diagnosis Date    Breast cancer     GERD (gastroesophageal reflux disease)      Past Surgical History:   Procedure Laterality Date    ABLATION      MEDIPORT INSERTION, DOUBLE Left 01/26/2023    TUBAL LIGATION         Family History   Problem Relation Age of Onset    Diabetes Maternal Grandmother     Lung cancer Maternal Grandfather     Liver cancer Maternal Uncle        Social History     Socioeconomic History    Marital status: Single   Tobacco Use    Smoking status: Never    Smokeless tobacco: Never   Substance and Sexual Activity    Alcohol use: Yes     Comment: occasionally    Drug use: Never    Sexual activity: Yes     Partners: Male       Current Outpatient Medications   Medication Sig Dispense Refill    boric acid (PH-D) 600 mg vaginal suppository INSERT 1 SUPPOSITORY VAGINALLY DAILY AS NEEDED      dexAMETHasone (DECADRON) 4 MG Tab Take 5 tablets by mouth at 12 hours and 6 hours prior to chemotherapy. 10 tablet 5    hydrOXYzine pamoate (VISTARIL) 25 MG Cap Take 1 capsule (25 mg total) by mouth every 8 (eight) hours as needed (itching). 30 capsule 1    lactulose (CHRONULAC) 10 gram/15 mL solution SMARTSIG:Milliliter(s) By Mouth      LINZESS 290 mcg  "Cap capsule Take 290 mcg by mouth.      ondansetron (ZOFRAN) 8 MG tablet Take 1 tablet (8 mg total) by mouth every 8 (eight) hours as needed for Nausea. 30 tablet 1    predniSONE (DELTASONE) 20 MG tablet Take 3 (20 mg) tablets by mouth daily for 5 days 15 tablet 0    promethazine (PHENERGAN) 12.5 MG Tab Take 1 tablet (12.5 mg total) by mouth every 4 (four) hours as needed. 60 tablet 1    traMADoL (ULTRAM) 50 mg tablet Take 50 mg by mouth every 4 (four) hours.       No current facility-administered medications for this visit.     Review of patient's allergies indicates:  No Known Allergies         Physical Exam:   Blood pressure 113/75, pulse 92, temperature 98.3 °F (36.8 °C), temperature source Oral, resp. rate 20, height 5' 6" (1.676 m), weight 93.8 kg (206 lb 14.4 oz), last menstrual period 02/02/2023, SpO2 100 %.     ECOG PS 0  GA: AAOx3, NAD  HEENT: NCAT, no tenderness to head with palpation.  PERRL, EOMI, good dentition, moist oral mucous membranes. One superficial oral ulcer mid region of upper gum line. No tenderness per patient report.   LYMPH: no cervical, axillary or supraclavicular adenopathy  CVS: s1s2 RRR, no M/R/G  RESP: CTA b/l, no crackles, no wheezes or rhonchi  BREAST:  Deferred  ABD: soft, NT, ND, BS+, no hepatosplenomegaly  EXT: no deformities, no pedal edema  SKIN: no rashes, warm and dry  NEURO: normal mentation, strength 5/5 on all 4 extremities, no sensory deficits      Assessment:       # Right breast invasive ductal carcinoma, triple negative  ER negative, [place] negative, HER2 IHC 0, Ki-67 85%   [id number], stage IIIC   Discussed with patient the diagnosis of triple negative breast cancer, treatment recommendations including neoadjuvant chemotherapy followed by surgery followed by adjuvant chemotherapy + radiation therapy   Reviewed PET-CT without evidence of distant metastatic disease.  Will plan for treatment with neoadjuvant carbo Taxol pembrolizumab followed by AC pembrolizumab prior to " surgical resection   S/p MRI bilateral breasts   Patient is not interested in fertility preservation  She is status post tubal ligation, discuss the need for dual contraception while on chemotherapy.  Baseline Echo 60%  S/p port placement and chemo ed  Genetic counseling done, will follow results.      Plan:     Reviewed labs hgb noted 9.6, transaminitis resolved.  Ok to proceed with cycle 6 day 1 AC today   Follow-up next week for pembrolizumab infusion w/ labs prior  Plan for cycle 7 day 1 AC 3 weeks.  Labs day prior.    Recommend gloves wild washing dishes, keep nails clean and dry.

## 2023-05-30 ENCOUNTER — TELEPHONE (OUTPATIENT)
Dept: HEMATOLOGY/ONCOLOGY | Facility: CLINIC | Age: 38
End: 2023-05-30
Payer: MEDICAID

## 2023-05-30 NOTE — TELEPHONE ENCOUNTER
Pt c/o nausea since last treatment and vomiting that started last night. Also c/o poor appetite. States has appt for Keytruda this morning and will not be able to make it. States has been taking zofran and phenergan with some relief.--SC, LPN

## 2023-06-13 ENCOUNTER — OFFICE VISIT (OUTPATIENT)
Dept: HEMATOLOGY/ONCOLOGY | Facility: CLINIC | Age: 38
End: 2023-06-13
Payer: MEDICAID

## 2023-06-13 VITALS
TEMPERATURE: 99 F | OXYGEN SATURATION: 99 % | BODY MASS INDEX: 32.66 KG/M2 | WEIGHT: 203.19 LBS | HEIGHT: 66 IN | SYSTOLIC BLOOD PRESSURE: 110 MMHG | DIASTOLIC BLOOD PRESSURE: 78 MMHG | HEART RATE: 81 BPM | RESPIRATION RATE: 18 BRPM

## 2023-06-13 DIAGNOSIS — Z51.11 ENCOUNTER FOR ANTINEOPLASTIC CHEMOTHERAPY: ICD-10-CM

## 2023-06-13 DIAGNOSIS — T45.1X5A ADVERSE EFFECT OF CHEMOTHERAPY, INITIAL ENCOUNTER: Primary | ICD-10-CM

## 2023-06-13 DIAGNOSIS — C50.411 MALIGNANT NEOPLASM OF UPPER-OUTER QUADRANT OF RIGHT BREAST IN FEMALE, ESTROGEN RECEPTOR NEGATIVE: ICD-10-CM

## 2023-06-13 DIAGNOSIS — Z17.1 MALIGNANT NEOPLASM OF UPPER-OUTER QUADRANT OF RIGHT BREAST IN FEMALE, ESTROGEN RECEPTOR NEGATIVE: ICD-10-CM

## 2023-06-13 PROCEDURE — 3008F BODY MASS INDEX DOCD: CPT | Mod: CPTII,,, | Performed by: STUDENT IN AN ORGANIZED HEALTH CARE EDUCATION/TRAINING PROGRAM

## 2023-06-13 PROCEDURE — 3074F PR MOST RECENT SYSTOLIC BLOOD PRESSURE < 130 MM HG: ICD-10-PCS | Mod: CPTII,,, | Performed by: STUDENT IN AN ORGANIZED HEALTH CARE EDUCATION/TRAINING PROGRAM

## 2023-06-13 PROCEDURE — 3008F PR BODY MASS INDEX (BMI) DOCUMENTED: ICD-10-PCS | Mod: CPTII,,, | Performed by: STUDENT IN AN ORGANIZED HEALTH CARE EDUCATION/TRAINING PROGRAM

## 2023-06-13 PROCEDURE — 1159F PR MEDICATION LIST DOCUMENTED IN MEDICAL RECORD: ICD-10-PCS | Mod: CPTII,,, | Performed by: STUDENT IN AN ORGANIZED HEALTH CARE EDUCATION/TRAINING PROGRAM

## 2023-06-13 PROCEDURE — 3078F PR MOST RECENT DIASTOLIC BLOOD PRESSURE < 80 MM HG: ICD-10-PCS | Mod: CPTII,,, | Performed by: STUDENT IN AN ORGANIZED HEALTH CARE EDUCATION/TRAINING PROGRAM

## 2023-06-13 PROCEDURE — 99215 OFFICE O/P EST HI 40 MIN: CPT | Mod: ,,, | Performed by: STUDENT IN AN ORGANIZED HEALTH CARE EDUCATION/TRAINING PROGRAM

## 2023-06-13 PROCEDURE — 3074F SYST BP LT 130 MM HG: CPT | Mod: CPTII,,, | Performed by: STUDENT IN AN ORGANIZED HEALTH CARE EDUCATION/TRAINING PROGRAM

## 2023-06-13 PROCEDURE — 3078F DIAST BP <80 MM HG: CPT | Mod: CPTII,,, | Performed by: STUDENT IN AN ORGANIZED HEALTH CARE EDUCATION/TRAINING PROGRAM

## 2023-06-13 PROCEDURE — 1159F MED LIST DOCD IN RCRD: CPT | Mod: CPTII,,, | Performed by: STUDENT IN AN ORGANIZED HEALTH CARE EDUCATION/TRAINING PROGRAM

## 2023-06-13 PROCEDURE — 99215 PR OFFICE/OUTPT VISIT, EST, LEVL V, 40-54 MIN: ICD-10-PCS | Mod: ,,, | Performed by: STUDENT IN AN ORGANIZED HEALTH CARE EDUCATION/TRAINING PROGRAM

## 2023-06-13 RX ORDER — SODIUM CHLORIDE 0.9 % (FLUSH) 0.9 %
10 SYRINGE (ML) INJECTION
Status: CANCELLED | OUTPATIENT
Start: 2023-06-13

## 2023-06-13 RX ORDER — OLANZAPINE 5 MG/1
5 TABLET ORAL SEE ADMIN INSTRUCTIONS
Qty: 8 TABLET | Refills: 0 | Status: SHIPPED | OUTPATIENT
Start: 2023-06-13 | End: 2024-04-01

## 2023-06-13 RX ORDER — DIPHENHYDRAMINE HYDROCHLORIDE 50 MG/ML
50 INJECTION INTRAMUSCULAR; INTRAVENOUS ONCE AS NEEDED
Status: CANCELLED | OUTPATIENT
Start: 2023-06-13

## 2023-06-13 RX ORDER — DOXORUBICIN HYDROCHLORIDE 2 MG/ML
60 INJECTION, SOLUTION INTRAVENOUS
Status: CANCELLED | OUTPATIENT
Start: 2023-06-13

## 2023-06-13 RX ORDER — EPINEPHRINE 0.3 MG/.3ML
0.3 INJECTION SUBCUTANEOUS ONCE AS NEEDED
Status: CANCELLED | OUTPATIENT
Start: 2023-06-13

## 2023-06-13 RX ORDER — HEPARIN 100 UNIT/ML
500 SYRINGE INTRAVENOUS
Status: CANCELLED | OUTPATIENT
Start: 2023-06-13

## 2023-06-13 NOTE — PROGRESS NOTES
Referring provider:  Dr. Burgess  Reason for consultation:  Triple negative right breast cancer     Diagnosis :   Right breast invasive ductal carcinoma, triple negative  ER negative, [place] negative, HER2 IHC 0, Ki-67 85%   [id number], stage IIIC       Current Treatment  02/02/2023-current:  Weekly Carbo Taxol plus q. 3 weeks pembrolizumab followed by q. 3 weeks AC plus pembro      HPI  Patient is a 37-year-old with no significant medical history who noticed a mass right breast in November 2022 which led to further workup including mammogram, ultrasound and biopsy, pathology from biopsy in January 2023 revealed triple negative breast cancer, grade 3.  Patient had further workup including a PET-CT with Dr. Burgess not reveal any distant metastatic disease.  She presented to our clinic on 01/23/2023 to establish care for further disease management.      Patient denies any history of smoking, alcohol use or recreational drug use.  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  She has an ECOG of 0.  She has a mother and sister accompanied to her initial visit.  Family history of lung cancer in her grandfather and liver cancer in 1 of her uncles.  No history of breast cancer in the family.    Interval History:     06/13/2023, patient denies any acute concerns.  She does report nausea associated with chemotherapy cycle which is poorly controlled on Zofran and Phenergan.  She denies any fevers but does report hot flashes.  She denies any tingling numbness, mouth sores, diarrhea.  She denies any new medications, ER or hospital visits since last follow-up visit with us.     LABS / IMAGING        Pathology  01/03/2023 right breast core biopsy and right axillary lymph node core biopsy:  Invasive ductal carcinoma, grade 3, right axillary lymph node biopsy with fragments of poorly differentiated malignant tumor consistent with ductal carcinoma.  Normal lymph node identified.  ER  negative, [place] negative, HER2 IHC 0, Ki-67 85.3%.    Imagin2023 CT abdomen pelvis with contrast:  No evidence of neoplastic disease, small umbilical hernia.    2022 echo EF 60%     01/10/2023 PET-CT:  Hypermetabolic mass in the superior right breast in keeping with the known cancer popliteal additional areas of mild-to-moderate uptake in the superior and retroareolar right breast are understood with definite discrete mass on CT but worrisome for infiltrative breast cancer.  Multiple hypermetabolic right axillary subpectoral and right internal mammary lymph nodes consistent with metastatic disease.  No other suspicious uptake.  Right breast mass measures 3 by 2.8 cm with an SUV of 13.7.  Inferior to the mass there was mild diffuse uptake associated with non-mass like dense breast tissue with an SUV of 4.0.  There is also abnormal uptake in the retroareolar right breast with definitive discrete mass can not be  from the dense glandular tissue with an SUV of 5.7 and measuring 1.5 x 1.1 cm.  These are concerning for infiltrative malignancies.    Laboratory   2023:  Creatinine 0.87, albumin 3.7, LFTs within normal limits, calcium 9.5, magnesium 1.8, phosphorus 4.2, TSH 0.33, WBC count 3.9, hemoglobin 9.6, MCV 95, platelet count 327, ANC 1.87.   23 cr 0.81, alb 3.8, ca 9.6, LFTs WNL, mag 1.8, phosphorus 3.8, wbc 6.99, hgb 9.6, plt 297, ANC 3.95  23: Cr 0.85, alb 3.8, ca 9.3, AST 45, ALT 70, mag 1.7, phosphorus 3.6, wbc 4.85, hgb 10.2, plt 197, ANC 2.20  2023:  Creatinine 0.8, albumin 3.6, alkaline phosphatase 77, total bili 0.4, AST 39, ALT 87, magnesium 1.7, phosphorus 3.3, WBC count 9.6, ANC 4.9, platelet count 196, hemoglobin 9.6.  3/23/23: cr 0.79 , Mg 1.7 TSH 1.8  AST 32  wbc 7.92 hgb 10.0 Plt 215  03/15/23: cr 0.81, TB 0.7, mag 2.0, TSH 0.7533, cortisol 17.9, , , wbc 4.62, hgb 11.1, plt 238, ANC 1.89, UPT neg  2023:  Creatinine 0.83, AST  73, , bilirubin 0.5, TSH 0.75, WBC 5.7, hemoglobin 11, platelets 222  03/02/2023:  WBC count 5.17, hemoglobin 10.2, MCV 91.1, platelet count 265, ANC 1.89, urine pregnancy negative, creatinine 0.9, albu3.5, ca 9.2, alkaline phosphatase 76, total bilirubin 0.3, AST 53, , magnesium 1.8, phosphorus 2.7, TSH 0.9.  02/22/2023: WBC 3.02, Hgb 10.9, MCV 88.0, , ANC 1.4 3, UPT negative, creatinine 0.82 albumin 3.7 magnesium 2.1 phosphorus 2.1, TSH 0.6540, cortisol 8.7  02/15/23 Cr 0.83, alb 3.7, LFTs WNL, mag 1.7, phosphorus 2.0, TSH 0.4434, cortisol 10.6, wbc 3.32, hgb 11.3, plt 266, ANC 1.42 UPT negative   02/08/23: cr 0.79, alb 3.5, ca 9.3, AST 36, ALT 45, mag 1.9, phosphorus 2.1, wbc 4.47, hgb 11.1, plt 264, ANC 2.28  02/01/2023:  Creatinine 0.83, albumin 3.7, LFTs within normal limits, TSH 1.09, cortisol 8.8, free T4 0.98, WBC 7.7, Hb 11.5, MCV 87.9, platelet count 283, ANC 4.09, urine pregnancy negative.    Past Medical History:   Diagnosis Date    Breast cancer     GERD (gastroesophageal reflux disease)      Past Surgical History:   Procedure Laterality Date    ABLATION      MEDIPORT INSERTION, DOUBLE Left 01/26/2023    TUBAL LIGATION         Family History   Problem Relation Age of Onset    Diabetes Maternal Grandmother     Lung cancer Maternal Grandfather     Liver cancer Maternal Uncle        Social History     Socioeconomic History    Marital status: Single   Tobacco Use    Smoking status: Never    Smokeless tobacco: Never   Substance and Sexual Activity    Alcohol use: Yes     Comment: occasionally    Drug use: Never    Sexual activity: Yes     Partners: Male       Current Outpatient Medications   Medication Sig Dispense Refill    boric acid (PH-D) 600 mg vaginal suppository INSERT 1 SUPPOSITORY VAGINALLY DAILY AS NEEDED      dexAMETHasone (DECADRON) 4 MG Tab Take 5 tablets by mouth at 12 hours and 6 hours prior to chemotherapy. 10 tablet 5    hydrOXYzine pamoate (VISTARIL) 25 MG Cap Take 1  "capsule (25 mg total) by mouth every 8 (eight) hours as needed (itching). 30 capsule 1    lactulose (CHRONULAC) 10 gram/15 mL solution SMARTSIG:Milliliter(s) By Mouth      LINZESS 290 mcg Cap capsule Take 290 mcg by mouth.      ondansetron (ZOFRAN) 8 MG tablet Take 1 tablet (8 mg total) by mouth every 8 (eight) hours as needed for Nausea. 30 tablet 1    predniSONE (DELTASONE) 20 MG tablet Take 3 (20 mg) tablets by mouth daily for 5 days 15 tablet 0    promethazine (PHENERGAN) 12.5 MG Tab Take 1 tablet (12.5 mg total) by mouth every 4 (four) hours as needed. 60 tablet 1    traMADoL (ULTRAM) 50 mg tablet Take 50 mg by mouth every 4 (four) hours.       No current facility-administered medications for this visit.     Review of patient's allergies indicates:  No Known Allergies         Physical Exam:   Blood pressure 110/78, pulse 81, temperature 98.6 °F (37 °C), temperature source Oral, resp. rate 18, height 5' 6" (1.676 m), weight 92.2 kg (203 lb 3.2 oz), SpO2 99 %.     ECOG PS 0  GA: AAOx3, NAD  HEENT: NCAT, no tenderness to head with palpation.  PERRL, EOMI, good dentition, moist oral mucous membranes. One superficial oral ulcer mid region of upper gum line. No tenderness per patient report.   LYMPH: no cervical, axillary or supraclavicular adenopathy  CVS: s1s2 RRR, no M/R/G  RESP: CTA b/l, no crackles, no wheezes or rhonchi  BREAST:  Deferred  ABD: soft, NT, ND, BS+, no hepatosplenomegaly  EXT: no deformities, no pedal edema  SKIN: no rashes, warm and dry  NEURO: normal mentation, strength 5/5 on all 4 extremities, no sensory deficits      Assessment:       # Right breast invasive ductal carcinoma, triple negative  ER negative, [place] negative, HER2 IHC 0, Ki-67 85%   [id number], stage IIIC   Discussed with patient the diagnosis of triple negative breast cancer, treatment recommendations including neoadjuvant chemotherapy followed by surgery followed by adjuvant chemotherapy + radiation therapy   Reviewed PET-CT without " evidence of distant metastatic disease.  Will plan for treatment with neoadjuvant carbo Taxol pembrolizumab followed by AC pembrolizumab prior to surgical resection   S/p MRI bilateral breasts   Patient is not interested in fertility preservation  She is status post tubal ligation, discuss the need for dual contraception while on chemotherapy.  Baseline Echo 60%  S/p port placement and chemo ed  Genetic counseling done, negative.      Plan:     Reviewed labs, okay to proceed with cycle 3 AC plus pembrolizumab today  Plan to follow-up in 3 weeks, labs prior to cycle 4 AC plus pembrolizumab   Patient will need a follow-up with surgery to discuss surgical resection after cycle 4 AC plus pembro.  Trial of olanzapine 5 mg on days 1-4 of chemotherapy cycle 4 chemotherapy induced acute and delayed nausea.     Portions of the record may have been created with voice recognition software. Occasional wrong-word or sound-a-like substitutions may have occurred due to the inherent limitations of voice recognition software. Read the chart carefully and recognize, using context, where substitutions have occurred.

## 2023-06-14 ENCOUNTER — TELEPHONE (OUTPATIENT)
Dept: HEMATOLOGY/ONCOLOGY | Facility: CLINIC | Age: 38
End: 2023-06-14
Payer: MEDICAID

## 2023-06-14 NOTE — TELEPHONE ENCOUNTER
Pt is requesting records sent to Dr. Ariza stating when she will be finished with treatment d/t spoke with Dr. Ariza's offce and appts are 4 weeks out. Please advise.--SC, WARREN

## 2023-06-26 DIAGNOSIS — C50.911 BREAST CANCER, RIGHT: Primary | ICD-10-CM

## 2023-07-05 ENCOUNTER — OFFICE VISIT (OUTPATIENT)
Dept: HEMATOLOGY/ONCOLOGY | Facility: CLINIC | Age: 38
End: 2023-07-05
Payer: MEDICAID

## 2023-07-05 VITALS
DIASTOLIC BLOOD PRESSURE: 78 MMHG | WEIGHT: 202.19 LBS | BODY MASS INDEX: 32.49 KG/M2 | RESPIRATION RATE: 20 BRPM | HEIGHT: 66 IN | TEMPERATURE: 98 F | HEART RATE: 85 BPM | OXYGEN SATURATION: 100 % | SYSTOLIC BLOOD PRESSURE: 116 MMHG

## 2023-07-05 DIAGNOSIS — Z17.1 MALIGNANT NEOPLASM OF UPPER-OUTER QUADRANT OF RIGHT BREAST IN FEMALE, ESTROGEN RECEPTOR NEGATIVE: Primary | ICD-10-CM

## 2023-07-05 DIAGNOSIS — C50.411 MALIGNANT NEOPLASM OF UPPER-OUTER QUADRANT OF RIGHT BREAST IN FEMALE, ESTROGEN RECEPTOR NEGATIVE: Primary | ICD-10-CM

## 2023-07-05 PROCEDURE — 3074F PR MOST RECENT SYSTOLIC BLOOD PRESSURE < 130 MM HG: ICD-10-PCS | Mod: CPTII,,,

## 2023-07-05 PROCEDURE — 99215 OFFICE O/P EST HI 40 MIN: CPT | Mod: ,,,

## 2023-07-05 PROCEDURE — 3078F DIAST BP <80 MM HG: CPT | Mod: CPTII,,,

## 2023-07-05 PROCEDURE — 99215 PR OFFICE/OUTPT VISIT, EST, LEVL V, 40-54 MIN: ICD-10-PCS | Mod: ,,,

## 2023-07-05 PROCEDURE — 1159F MED LIST DOCD IN RCRD: CPT | Mod: CPTII,,,

## 2023-07-05 PROCEDURE — 1159F PR MEDICATION LIST DOCUMENTED IN MEDICAL RECORD: ICD-10-PCS | Mod: CPTII,,,

## 2023-07-05 PROCEDURE — 3078F PR MOST RECENT DIASTOLIC BLOOD PRESSURE < 80 MM HG: ICD-10-PCS | Mod: CPTII,,,

## 2023-07-05 PROCEDURE — 3008F BODY MASS INDEX DOCD: CPT | Mod: CPTII,,,

## 2023-07-05 PROCEDURE — 3008F PR BODY MASS INDEX (BMI) DOCUMENTED: ICD-10-PCS | Mod: CPTII,,,

## 2023-07-05 PROCEDURE — 3074F SYST BP LT 130 MM HG: CPT | Mod: CPTII,,,

## 2023-07-05 RX ORDER — EPINEPHRINE 0.3 MG/.3ML
0.3 INJECTION SUBCUTANEOUS ONCE AS NEEDED
Status: CANCELLED | OUTPATIENT
Start: 2023-07-05

## 2023-07-05 RX ORDER — HEPARIN 100 UNIT/ML
500 SYRINGE INTRAVENOUS
Status: CANCELLED | OUTPATIENT
Start: 2023-07-05

## 2023-07-05 RX ORDER — DOXORUBICIN HYDROCHLORIDE 2 MG/ML
60 INJECTION, SOLUTION INTRAVENOUS
Status: CANCELLED | OUTPATIENT
Start: 2023-07-05

## 2023-07-05 RX ORDER — OLANZAPINE 10 MG/1
TABLET ORAL
COMMUNITY
Start: 2023-06-20

## 2023-07-05 RX ORDER — DIPHENHYDRAMINE HYDROCHLORIDE 50 MG/ML
50 INJECTION INTRAMUSCULAR; INTRAVENOUS ONCE AS NEEDED
Status: CANCELLED | OUTPATIENT
Start: 2023-07-05

## 2023-07-05 RX ORDER — SODIUM CHLORIDE 0.9 % (FLUSH) 0.9 %
10 SYRINGE (ML) INJECTION
Status: CANCELLED | OUTPATIENT
Start: 2023-07-05

## 2023-07-05 NOTE — PROGRESS NOTES
Referring provider:  Dr. Burgess  Reason for consultation:  Triple negative right breast cancer     Diagnosis :   Right breast invasive ductal carcinoma, triple negative  ER negative, PA negative, HER2 IHC 0, Ki-67 85%   [id number], stage IIIC       Current Treatment  02/02/2023-current:  Weekly Carbo Taxol plus q. 3 weeks pembrolizumab followed by q. 3 weeks AC plus pembro      HPI  Patient is a 37-year-old with no significant medical history who noticed a mass right breast in November 2022 which led to further workup including mammogram, ultrasound and biopsy, pathology from biopsy in January 2023 revealed triple negative breast cancer, grade 3.  Patient had further workup including a PET-CT with Dr. Burgess not reveal any distant metastatic disease.  She presented to our clinic on 01/23/2023 to establish care for further disease management.      Patient denies any history of smoking, alcohol use or recreational drug use.  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  She has an ECOG of 0.  She has a mother and sister accompanied to her initial visit.  Family history of lung cancer in her grandfather and liver cancer in 1 of her uncles.  No history of breast cancer in the family.    Interval History:     07/05/2023, patient denies any acute concerns.  She is doing well. She has a breast MRI and f/u on 8/7/23 with Dr. Burgess prior to scheduling breast surgery.  She denies any fevers but does report hot flashes.  She denies any tingling numbness, mouth sores, diarrhea.  She denies any new medications, ER or hospital visits since last follow-up visit with us.     LABS / IMAGING        Pathology  01/03/2023 right breast core biopsy and right axillary lymph node core biopsy:  Invasive ductal carcinoma, grade 3, right axillary lymph node biopsy with fragments of poorly differentiated malignant tumor consistent with ductal carcinoma.  Normal lymph node identified.  ER  negative, MD negative, HER2 IHC 0, Ki-67 85.3%.    Imagin2023 CT abdomen pelvis with contrast:  No evidence of neoplastic disease, small umbilical hernia.    2022 echo EF 60%     01/10/2023 PET-CT:  Hypermetabolic mass in the superior right breast in keeping with the known cancer popliteal additional areas of mild-to-moderate uptake in the superior and retroareolar right breast are understood with definite discrete mass on CT but worrisome for infiltrative breast cancer.  Multiple hypermetabolic right axillary subpectoral and right internal mammary lymph nodes consistent with metastatic disease.  No other suspicious uptake.  Right breast mass measures 3 by 2.8 cm with an SUV of 13.7.  Inferior to the mass there was mild diffuse uptake associated with non-mass like dense breast tissue with an SUV of 4.0.  There is also abnormal uptake in the retroareolar right breast with definitive discrete mass can not be  from the dense glandular tissue with an SUV of 5.7 and measuring 1.5 x 1.1 cm.  These are concerning for infiltrative malignancies.    Laboratory   2023: CMP unremarkable, TSH 0.50, cortisol 8.6, wbc 5.83, hgb 9.7, plt 307, ANC 3.57  2023:  Creatinine 0.87, albumin 3.7, LFTs within normal limits, calcium 9.5, magnesium 1.8, phosphorus 4.2, TSH 0.33, WBC count 3.9, hemoglobin 9.6, MCV 95, platelet count 327, ANC 1.87.   23 cr 0.81, alb 3.8, ca 9.6, LFTs WNL, mag 1.8, phosphorus 3.8, wbc 6.99, hgb 9.6, plt 297, ANC 3.95  23: Cr 0.85, alb 3.8, ca 9.3, AST 45, ALT 70, mag 1.7, phosphorus 3.6, wbc 4.85, hgb 10.2, plt 197, ANC 2.20  2023:  Creatinine 0.8, albumin 3.6, alkaline phosphatase 77, total bili 0.4, AST 39, ALT 87, magnesium 1.7, phosphorus 3.3, WBC count 9.6, ANC 4.9, platelet count 196, hemoglobin 9.6.  3/23/23: cr 0.79 , Mg 1.7 TSH 1.8  AST 32  wbc 7.92 hgb 10.0 Plt 215  03/15/23: cr 0.81, TB 0.7, mag 2.0, TSH 0.7533, cortisol 17.9, ,  , wbc 4.62, hgb 11.1, plt 238, ANC 1.89, UPT neg  03/08/2023:  Creatinine 0.83, AST 73, , bilirubin 0.5, TSH 0.75, WBC 5.7, hemoglobin 11, platelets 222  03/02/2023:  WBC count 5.17, hemoglobin 10.2, MCV 91.1, platelet count 265, ANC 1.89, urine pregnancy negative, creatinine 0.9, albu3.5, ca 9.2, alkaline phosphatase 76, total bilirubin 0.3, AST 53, , magnesium 1.8, phosphorus 2.7, TSH 0.9.  02/22/2023: WBC 3.02, Hgb 10.9, MCV 88.0, , ANC 1.4 3, UPT negative, creatinine 0.82 albumin 3.7 magnesium 2.1 phosphorus 2.1, TSH 0.6540, cortisol 8.7  02/15/23 Cr 0.83, alb 3.7, LFTs WNL, mag 1.7, phosphorus 2.0, TSH 0.4434, cortisol 10.6, wbc 3.32, hgb 11.3, plt 266, ANC 1.42 UPT negative   02/08/23: cr 0.79, alb 3.5, ca 9.3, AST 36, ALT 45, mag 1.9, phosphorus 2.1, wbc 4.47, hgb 11.1, plt 264, ANC 2.28  02/01/2023:  Creatinine 0.83, albumin 3.7, LFTs within normal limits, TSH 1.09, cortisol 8.8, free T4 0.98, WBC 7.7, Hb 11.5, MCV 87.9, platelet count 283, ANC 4.09, urine pregnancy negative.    Past Medical History:   Diagnosis Date    Breast cancer     GERD (gastroesophageal reflux disease)      Past Surgical History:   Procedure Laterality Date    ABLATION      MEDIPORT INSERTION, DOUBLE Left 01/26/2023    TUBAL LIGATION         Family History   Problem Relation Age of Onset    Diabetes Maternal Grandmother     Lung cancer Maternal Grandfather     Liver cancer Maternal Uncle        Social History     Socioeconomic History    Marital status: Single   Tobacco Use    Smoking status: Never    Smokeless tobacco: Never   Substance and Sexual Activity    Alcohol use: Yes     Comment: occasionally    Drug use: Never    Sexual activity: Yes     Partners: Male       Current Outpatient Medications   Medication Sig Dispense Refill    boric acid (PH-D) 600 mg vaginal suppository INSERT 1 SUPPOSITORY VAGINALLY DAILY AS NEEDED      dexAMETHasone (DECADRON) 4 MG Tab Take 5 tablets by mouth at 12 hours and 6  "hours prior to chemotherapy. 10 tablet 5    hydrOXYzine pamoate (VISTARIL) 25 MG Cap Take 1 capsule (25 mg total) by mouth every 8 (eight) hours as needed (itching). 30 capsule 1    lactulose (CHRONULAC) 10 gram/15 mL solution SMARTSIG:Milliliter(s) By Mouth      LINZESS 290 mcg Cap capsule Take 290 mcg by mouth.      OLANZapine (ZYPREXA) 10 MG tablet Take by mouth.      ondansetron (ZOFRAN) 8 MG tablet Take 1 tablet (8 mg total) by mouth every 8 (eight) hours as needed for Nausea. 30 tablet 1    predniSONE (DELTASONE) 20 MG tablet Take 3 (20 mg) tablets by mouth daily for 5 days 15 tablet 0    promethazine (PHENERGAN) 12.5 MG Tab Take 1 tablet (12.5 mg total) by mouth every 4 (four) hours as needed. 60 tablet 1    traMADoL (ULTRAM) 50 mg tablet Take 50 mg by mouth every 4 (four) hours.      OLANZapine (ZYPREXA) 5 MG tablet Take 1 tablet (5 mg total) by mouth As instructed. Take on days 1 to 4 of chemotherapy cycle at night. (Patient not taking: Reported on 7/5/2023) 8 tablet 0     No current facility-administered medications for this visit.     Review of patient's allergies indicates:  No Known Allergies         Physical Exam:   Blood pressure 116/78, pulse 85, temperature 98.1 °F (36.7 °C), temperature source Oral, resp. rate 20, height 5' 6" (1.676 m), weight 91.7 kg (202 lb 3.2 oz), SpO2 100 %.     ECOG PS 0  GA: AAOx3, NAD  HEENT: NCAT, no tenderness to head with palpation.  PERRL, EOMI, good dentition, moist oral mucous membranes. One superficial oral ulcer mid region of upper gum line. No tenderness per patient report.   LYMPH: no cervical, axillary or supraclavicular adenopathy  CVS: s1s2 RRR, no M/R/G  RESP: CTA b/l, no crackles, no wheezes or rhonchi  BREAST:  Deferred  ABD: soft, NT, ND, BS+, no hepatosplenomegaly  EXT: no deformities, no pedal edema  SKIN: no rashes, warm and dry  NEURO: normal mentation, strength 5/5 on all 4 extremities, no sensory deficits      Assessment:       # Right breast " invasive ductal carcinoma, triple negative  ER negative, AL negative, HER2 IHC 0, Ki-67 85%   [id number], stage IIIC   Discussed with patient the diagnosis of triple negative breast cancer, treatment recommendations including neoadjuvant chemotherapy followed by surgery followed by adjuvant chemotherapy + radiation therapy   Reviewed PET-CT without evidence of distant metastatic disease.  Will plan for treatment with neoadjuvant carbo Taxol pembrolizumab followed by AC pembrolizumab prior to surgical resection   S/p MRI bilateral breasts   Patient is not interested in fertility preservation  She is status post tubal ligation, discuss the need for dual contraception while on chemotherapy.  Baseline Echo 60%  S/p port placement and chemo ed  Genetic counseling done, negative.      Plan:     Reviewed labs, okay to proceed with cycle 4 AC plus pembrolizumab today  Plan to follow-up in 3 weeks, labs prior to breast surgery   She has a breast  MRI and f/u with Dr. Burgess prior to breast surgery. Appt 8/7/23  Patient will contact office once breast surgery complete and to resume q 3 week pembrolizumab per Dr. Burgess office

## 2023-07-24 ENCOUNTER — APPOINTMENT (OUTPATIENT)
Dept: RADIOLOGY | Facility: HOSPITAL | Age: 38
End: 2023-07-24
Attending: SURGERY
Payer: MEDICAID

## 2023-07-24 DIAGNOSIS — C50.911 BREAST CANCER, RIGHT: ICD-10-CM

## 2023-07-24 PROCEDURE — 77049 MRI BREAST C-+ W/CAD BI: CPT | Mod: 26,,, | Performed by: STUDENT IN AN ORGANIZED HEALTH CARE EDUCATION/TRAINING PROGRAM

## 2023-07-24 PROCEDURE — 25500020 PHARM REV CODE 255

## 2023-07-24 PROCEDURE — 77049 MRI BREAST C-+ W/CAD BI: CPT | Mod: TC

## 2023-07-24 PROCEDURE — A9577 INJ MULTIHANCE: HCPCS

## 2023-07-24 PROCEDURE — 77049 MRI BREAST W/WO CONTRAST, W/CAD, BILATERAL: ICD-10-PCS | Mod: 26,,, | Performed by: STUDENT IN AN ORGANIZED HEALTH CARE EDUCATION/TRAINING PROGRAM

## 2023-07-24 RX ADMIN — GADOBENATE DIMEGLUMINE 20 ML: 529 INJECTION, SOLUTION INTRAVENOUS at 03:07

## 2023-07-26 ENCOUNTER — OFFICE VISIT (OUTPATIENT)
Dept: HEMATOLOGY/ONCOLOGY | Facility: CLINIC | Age: 38
End: 2023-07-26
Payer: MEDICAID

## 2023-07-26 VITALS — WEIGHT: 198 LBS | BODY MASS INDEX: 31.82 KG/M2 | HEIGHT: 66 IN

## 2023-07-26 DIAGNOSIS — Z17.1 MALIGNANT NEOPLASM OF UPPER-OUTER QUADRANT OF RIGHT BREAST IN FEMALE, ESTROGEN RECEPTOR NEGATIVE: Primary | ICD-10-CM

## 2023-07-26 DIAGNOSIS — C50.411 MALIGNANT NEOPLASM OF UPPER-OUTER QUADRANT OF RIGHT BREAST IN FEMALE, ESTROGEN RECEPTOR NEGATIVE: Primary | ICD-10-CM

## 2023-07-26 PROCEDURE — 3008F BODY MASS INDEX DOCD: CPT | Mod: CPTII,95,,

## 2023-07-26 PROCEDURE — 1159F MED LIST DOCD IN RCRD: CPT | Mod: CPTII,95,,

## 2023-07-26 PROCEDURE — 3008F PR BODY MASS INDEX (BMI) DOCUMENTED: ICD-10-PCS | Mod: CPTII,95,,

## 2023-07-26 PROCEDURE — 99214 OFFICE O/P EST MOD 30 MIN: CPT | Mod: 95,,,

## 2023-07-26 PROCEDURE — 1159F PR MEDICATION LIST DOCUMENTED IN MEDICAL RECORD: ICD-10-PCS | Mod: CPTII,95,,

## 2023-07-26 PROCEDURE — 99214 PR OFFICE/OUTPT VISIT, EST, LEVL IV, 30-39 MIN: ICD-10-PCS | Mod: 95,,,

## 2023-07-26 NOTE — PROGRESS NOTES
Established Patient - Audio Only Telehealth Visit     The patient location is: Home  The chief complaint leading to consultation is: breast cancer  Visit type: Virtual visit with audio only (telephone)  Total time spent with patient: 12 minutes       The reason for the audio only service rather than synchronous audio and video virtual visit was related to technical difficulties or patient preference/necessity.     Each patient to whom I provide medical services by telemedicine is:  (1) informed of the relationship between the physician and patient and the respective role of any other health care provider with respect to management of the patient; and (2) notified that they may decline to receive medical services by telemedicine and may withdraw from such care at any time. Patient verbally consented to receive this service via voice-only telephone call.       Referring provider:  Dr. Burgess  Reason for consultation:  Triple negative right breast cancer     Diagnosis :   Right breast invasive ductal carcinoma, triple negative  ER negative, MD negative, HER2 IHC 0, Ki-67 85%   [id number], stage IIIC       Current Treatment  02/02/2023-current:  Weekly Carbo Taxol plus q. 3 weeks pembrolizumab followed by q. 3 weeks AC plus pembro      HPI  Patient is a 37-year-old with no significant medical history who noticed a mass right breast in November 2022 which led to further workup including mammogram, ultrasound and biopsy, pathology from biopsy in January 2023 revealed triple negative breast cancer, grade 3.  Patient had further workup including a PET-CT with Dr. Burgess not reveal any distant metastatic disease.  She presented to our clinic on 01/23/2023 to establish care for further disease management.      Patient denies any history of smoking, alcohol use or recreational drug use.  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  She has an ECOG of 0.  She has a mother and  sister accompanied to her initial visit.  Family history of lung cancer in her grandfather and liver cancer in 1 of her uncles.  No history of breast cancer in the family.    Interval History:     2023, patient denies any acute concerns.  She is doing well. She has an appt w/ Dr. Burgess on 23 prior to scheduling breast surgery.  She denies any fevers but does report hot flashes.  She denies any tingling numbness, mouth sores, diarrhea.  She denies any new medications, ER or hospital visits since last follow-up visit with us.     LABS / IMAGING        Pathology  2023 right breast core biopsy and right axillary lymph node core biopsy:  Invasive ductal carcinoma, grade 3, right axillary lymph node biopsy with fragments of poorly differentiated malignant tumor consistent with ductal carcinoma.  Normal lymph node identified.  ER negative, MT negative, HER2 IHC 0, Ki-67 85.3%.    Imagin2023 CT abdomen pelvis with contrast:  No evidence of neoplastic disease, small umbilical hernia.    2022 echo EF 60%     01/10/2023 PET-CT:  Hypermetabolic mass in the superior right breast in keeping with the known cancer popliteal additional areas of mild-to-moderate uptake in the superior and retroareolar right breast are understood with definite discrete mass on CT but worrisome for infiltrative breast cancer.  Multiple hypermetabolic right axillary subpectoral and right internal mammary lymph nodes consistent with metastatic disease.  No other suspicious uptake.  Right breast mass measures 3 by 2.8 cm with an SUV of 13.7.  Inferior to the mass there was mild diffuse uptake associated with non-mass like dense breast tissue with an SUV of 4.0.  There is also abnormal uptake in the retroareolar right breast with definitive discrete mass can not be  from the dense glandular tissue with an SUV of 5.7 and measuring 1.5 x 1.1 cm.  These are concerning for infiltrative malignancies.    Laboratory    07/25/23: CMP WNL, wbc 3.83, hgb 9.4, plt 297, ANC 1.55, mag 1.8, phosphorus 4.2  07/04/2023: CMP unremarkable, TSH 0.50, cortisol 8.6, wbc 5.83, hgb 9.7, plt 307, ANC 3.57  06/12/2023:  Creatinine 0.87, albumin 3.7, LFTs within normal limits, calcium 9.5, magnesium 1.8, phosphorus 4.2, TSH 0.33, WBC count 3.9, hemoglobin 9.6, MCV 95, platelet count 327, ANC 1.87.   05/22/23 cr 0.81, alb 3.8, ca 9.6, LFTs WNL, mag 1.8, phosphorus 3.8, wbc 6.99, hgb 9.6, plt 297, ANC 3.95  05/01/23: Cr 0.85, alb 3.8, ca 9.3, AST 45, ALT 70, mag 1.7, phosphorus 3.6, wbc 4.85, hgb 10.2, plt 197, ANC 2.20  04/24/2023:  Creatinine 0.8, albumin 3.6, alkaline phosphatase 77, total bili 0.4, AST 39, ALT 87, magnesium 1.7, phosphorus 3.3, WBC count 9.6, ANC 4.9, platelet count 196, hemoglobin 9.6.  3/23/23: cr 0.79 , Mg 1.7 TSH 1.8  AST 32  wbc 7.92 hgb 10.0 Plt 215  03/15/23: cr 0.81, TB 0.7, mag 2.0, TSH 0.7533, cortisol 17.9, , , wbc 4.62, hgb 11.1, plt 238, ANC 1.89, UPT neg  03/08/2023:  Creatinine 0.83, AST 73, , bilirubin 0.5, TSH 0.75, WBC 5.7, hemoglobin 11, platelets 222  03/02/2023:  WBC count 5.17, hemoglobin 10.2, MCV 91.1, platelet count 265, ANC 1.89, urine pregnancy negative, creatinine 0.9, albu3.5, ca 9.2, alkaline phosphatase 76, total bilirubin 0.3, AST 53, , magnesium 1.8, phosphorus 2.7, TSH 0.9.  02/22/2023: WBC 3.02, Hgb 10.9, MCV 88.0, , ANC 1.4 3, UPT negative, creatinine 0.82 albumin 3.7 magnesium 2.1 phosphorus 2.1, TSH 0.6540, cortisol 8.7  02/15/23 Cr 0.83, alb 3.7, LFTs WNL, mag 1.7, phosphorus 2.0, TSH 0.4434, cortisol 10.6, wbc 3.32, hgb 11.3, plt 266, ANC 1.42 UPT negative   02/08/23: cr 0.79, alb 3.5, ca 9.3, AST 36, ALT 45, mag 1.9, phosphorus 2.1, wbc 4.47, hgb 11.1, plt 264, ANC 2.28  02/01/2023:  Creatinine 0.83, albumin 3.7, LFTs within normal limits, TSH 1.09, cortisol 8.8, free T4 0.98, WBC 7.7, Hb 11.5, MCV 87.9, platelet count 283, ANC 4.09, urine  pregnancy negative.    Past Medical History:   Diagnosis Date    Breast cancer     GERD (gastroesophageal reflux disease)      Past Surgical History:   Procedure Laterality Date    ABLATION      MEDIPORT INSERTION, DOUBLE Left 01/26/2023    TUBAL LIGATION         Family History   Problem Relation Age of Onset    Diabetes Maternal Grandmother     Lung cancer Maternal Grandfather     Liver cancer Maternal Uncle        Social History     Socioeconomic History    Marital status: Single   Tobacco Use    Smoking status: Never    Smokeless tobacco: Never   Substance and Sexual Activity    Alcohol use: Yes     Comment: occasionally    Drug use: Never    Sexual activity: Yes     Partners: Male       Current Outpatient Medications   Medication Sig Dispense Refill    boric acid (PH-D) 600 mg vaginal suppository INSERT 1 SUPPOSITORY VAGINALLY DAILY AS NEEDED      dexAMETHasone (DECADRON) 4 MG Tab Take 5 tablets by mouth at 12 hours and 6 hours prior to chemotherapy. 10 tablet 5    hydrOXYzine pamoate (VISTARIL) 25 MG Cap Take 1 capsule (25 mg total) by mouth every 8 (eight) hours as needed (itching). 30 capsule 1    lactulose (CHRONULAC) 10 gram/15 mL solution SMARTSIG:Milliliter(s) By Mouth      LINZESS 290 mcg Cap capsule Take 290 mcg by mouth.      OLANZapine (ZYPREXA) 10 MG tablet Take by mouth.      ondansetron (ZOFRAN) 8 MG tablet Take 1 tablet (8 mg total) by mouth every 8 (eight) hours as needed for Nausea. 30 tablet 1    predniSONE (DELTASONE) 20 MG tablet Take 3 (20 mg) tablets by mouth daily for 5 days 15 tablet 0    promethazine (PHENERGAN) 12.5 MG Tab Take 1 tablet (12.5 mg total) by mouth every 4 (four) hours as needed. 60 tablet 1    traMADoL (ULTRAM) 50 mg tablet Take 50 mg by mouth every 4 (four) hours.      OLANZapine (ZYPREXA) 5 MG tablet Take 1 tablet (5 mg total) by mouth As instructed. Take on days 1 to 4 of chemotherapy cycle at night. (Patient not taking: Reported on 7/5/2023) 8 tablet 0     No current  "facility-administered medications for this visit.     Review of patient's allergies indicates:  No Known Allergies         Physical Exam:   Height 5' 6" (1.676 m), weight 89.8 kg (198 lb).     ECOG PS 0  GA: AAOx3, NAD  HEENT: NCAT, no tenderness to head with palpation.  PERRL, EOMI, good dentition, moist oral mucous membranes. One superficial oral ulcer mid region of upper gum line. No tenderness per patient report.   LYMPH: no cervical, axillary or supraclavicular adenopathy  CVS: s1s2 RRR, no M/R/G  RESP: CTA b/l, no crackles, no wheezes or rhonchi  BREAST:  Deferred  ABD: soft, NT, ND, BS+, no hepatosplenomegaly  EXT: no deformities, no pedal edema  SKIN: no rashes, warm and dry  NEURO: normal mentation, strength 5/5 on all 4 extremities, no sensory deficits      Assessment:       # Right breast invasive ductal carcinoma, triple negative  ER negative, DC negative, HER2 IHC 0, Ki-67 85%   [id number], stage IIIC   Discussed with patient the diagnosis of triple negative breast cancer, treatment recommendations including neoadjuvant chemotherapy followed by surgery followed by adjuvant chemotherapy + radiation therapy   Reviewed PET-CT without evidence of distant metastatic disease.  Will plan for treatment with neoadjuvant carbo Taxol pembrolizumab followed by AC pembrolizumab prior to surgical resection   S/p MRI bilateral breasts   Patient is not interested in fertility preservation  She is status post tubal ligation, discuss the need for dual contraception while on chemotherapy.  Baseline Echo 60%  S/p port placement and chemo ed  Genetic counseling done, negative.      Plan:   Labs stable post chemotherapy. She has appt with Dr. Burgess on 8/7/23 to review MRI results and surgical evaluation.   Patient will contact office once breast surgery complete, to resume q 3 week pembrolizumab per Dr. Burgess office                   This service was not originating from a related E/M service provided within the previous 7 " days nor will  to an E/M service or procedure within the next 24 hours or my soonest available appointment.  Prevailing standard of care was able to be met in this audio-only visit.

## 2023-07-31 ENCOUNTER — PATIENT MESSAGE (OUTPATIENT)
Dept: RESEARCH | Facility: HOSPITAL | Age: 38
End: 2023-07-31
Payer: MEDICAID

## 2023-10-06 NOTE — PROGRESS NOTES
Referring provider:  Dr. Burgess  Reason for consultation:  Triple negative right breast cancer     Diagnosis :   Right breast invasive ductal carcinoma, triple negative  ER negative, [place] negative, HER2 IHC 0, Ki-67 85%   [id number], stage IIIC       Current Treatment  02/02/2023-current:  Weekly Carbo Taxol plus q. 3 weeks pembrolizumab followed by q. 3 weeks AC plus pembro      HPI  Patient is a 37-year-old with no significant medical history who noticed a mass right breast in November 2022 which led to further workup including mammogram, ultrasound and biopsy, pathology from biopsy in January 2023 revealed triple negative breast cancer, grade 3.  Patient had further workup including a PET-CT with Dr. Burgess not reveal any distant metastatic disease.  She presented to our clinic on 01/23/2023 to establish care for further disease management.      Patient denies any history of smoking, alcohol use or recreational drug use.  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  She has an ECOG of 0.  She has a mother and sister accompanied to her initial visit.  Family history of lung cancer in her grandfather and liver cancer in 1 of her uncles.  No history of breast cancer in the family.    Interval History:     10/09/2023, Patient returns for follow-up pst b/l mastectomy. She has had both drains removed. She recently had a right breast seroma drained with surgeon. She had f/u with radiation oncology and will have scan done on 10/16/23 to determine radiation treatment plan. he is doing well. She has an appt w/ Dr. Burgess at  end of October. She denies any fevers but does report hot flashes.  She denies any tingling numbness, mouth sores, diarrhea.  She denies any new medications, ER or hospital visits since last follow-up visit with us.     LABS / IMAGING        Pathology  01/03/2023 right breast core biopsy and right axillary lymph node core biopsy:  Invasive ductal  carcinoma, grade 3, right axillary lymph node biopsy with fragments of poorly differentiated malignant tumor consistent with ductal carcinoma.  Normal lymph node identified.  ER negative, [place] negative, HER2 IHC 0, Ki-67 85.3%.    Imagin2023 CT abdomen pelvis with contrast:  No evidence of neoplastic disease, small umbilical hernia.    2022 echo EF 60%     01/10/2023 PET-CT:  Hypermetabolic mass in the superior right breast in keeping with the known cancer popliteal additional areas of mild-to-moderate uptake in the superior and retroareolar right breast are understood with definite discrete mass on CT but worrisome for infiltrative breast cancer.  Multiple hypermetabolic right axillary subpectoral and right internal mammary lymph nodes consistent with metastatic disease.  No other suspicious uptake.  Right breast mass measures 3 by 2.8 cm with an SUV of 13.7.  Inferior to the mass there was mild diffuse uptake associated with non-mass like dense breast tissue with an SUV of 4.0.  There is also abnormal uptake in the retroareolar right breast with definitive discrete mass can not be  from the dense glandular tissue with an SUV of 5.7 and measuring 1.5 x 1.1 cm.  These are concerning for infiltrative malignancies.    Laboratory   10/09/23: cr 0.82, alb 3.2, ca 8.9, LFTs WNL, TSH 1.5, wbc 6.93, hgb 9.8, plt 258, ANC 3.10  23: CMP WNL, wbc 3.83, hgb 9.4, plt 297, ANC 1.55, mag 1.8, phosphorus 4.2  2023: CMP unremarkable, TSH 0.50, cortisol 8.6, wbc 5.83, hgb 9.7, plt 307, ANC 3.57  2023:  Creatinine 0.87, albumin 3.7, LFTs within normal limits, calcium 9.5, magnesium 1.8, phosphorus 4.2, TSH 0.33, WBC count 3.9, hemoglobin 9.6, MCV 95, platelet count 327, ANC 1.87.   23 cr 0.81, alb 3.8, ca 9.6, LFTs WNL, mag 1.8, phosphorus 3.8, wbc 6.99, hgb 9.6, plt 297, ANC 3.95  23: Cr 0.85, alb 3.8, ca 9.3, AST 45, ALT 70, mag 1.7, phosphorus 3.6, wbc 4.85, hgb 10.2, plt  197, ANC 2.20  04/24/2023:  Creatinine 0.8, albumin 3.6, alkaline phosphatase 77, total bili 0.4, AST 39, ALT 87, magnesium 1.7, phosphorus 3.3, WBC count 9.6, ANC 4.9, platelet count 196, hemoglobin 9.6.  3/23/23: cr 0.79 , Mg 1.7 TSH 1.8  AST 32  wbc 7.92 hgb 10.0 Plt 215  03/15/23: cr 0.81, TB 0.7, mag 2.0, TSH 0.7533, cortisol 17.9, , , wbc 4.62, hgb 11.1, plt 238, ANC 1.89, UPT neg  03/08/2023:  Creatinine 0.83, AST 73, , bilirubin 0.5, TSH 0.75, WBC 5.7, hemoglobin 11, platelets 222  03/02/2023:  WBC count 5.17, hemoglobin 10.2, MCV 91.1, platelet count 265, ANC 1.89, urine pregnancy negative, creatinine 0.9, albu3.5, ca 9.2, alkaline phosphatase 76, total bilirubin 0.3, AST 53, , magnesium 1.8, phosphorus 2.7, TSH 0.9.  02/22/2023: WBC 3.02, Hgb 10.9, MCV 88.0, , ANC 1.4 3, UPT negative, creatinine 0.82 albumin 3.7 magnesium 2.1 phosphorus 2.1, TSH 0.6540, cortisol 8.7  02/15/23 Cr 0.83, alb 3.7, LFTs WNL, mag 1.7, phosphorus 2.0, TSH 0.4434, cortisol 10.6, wbc 3.32, hgb 11.3, plt 266, ANC 1.42 UPT negative   02/08/23: cr 0.79, alb 3.5, ca 9.3, AST 36, ALT 45, mag 1.9, phosphorus 2.1, wbc 4.47, hgb 11.1, plt 264, ANC 2.28  02/01/2023:  Creatinine 0.83, albumin 3.7, LFTs within normal limits, TSH 1.09, cortisol 8.8, free T4 0.98, WBC 7.7, Hb 11.5, MCV 87.9, platelet count 283, ANC 4.09, urine pregnancy negative.    Past Medical History:   Diagnosis Date    Breast cancer     GERD (gastroesophageal reflux disease)      Past Surgical History:   Procedure Laterality Date    ABLATION      MEDIPORT INSERTION, DOUBLE Left 01/26/2023    TUBAL LIGATION         Family History   Problem Relation Age of Onset    Diabetes Maternal Grandmother     Lung cancer Maternal Grandfather     Liver cancer Maternal Uncle        Social History     Socioeconomic History    Marital status: Single   Tobacco Use    Smoking status: Never    Smokeless tobacco: Never   Substance and Sexual Activity  "   Alcohol use: Yes     Comment: occasionally    Drug use: Never    Sexual activity: Yes     Partners: Male       Current Outpatient Medications   Medication Sig Dispense Refill    boric acid (PH-D) 600 mg vaginal suppository INSERT 1 SUPPOSITORY VAGINALLY DAILY AS NEEDED      LINZESS 290 mcg Cap capsule Take 290 mcg by mouth.      promethazine-dextromethorphan (PROMETHAZINE-DM) 6.25-15 mg/5 mL Syrp Take by mouth.      dexAMETHasone (DECADRON) 4 MG Tab Take 5 tablets by mouth at 12 hours and 6 hours prior to chemotherapy. (Patient not taking: Reported on 10/9/2023) 10 tablet 5    hydrOXYzine pamoate (VISTARIL) 25 MG Cap Take 1 capsule (25 mg total) by mouth every 8 (eight) hours as needed (itching). (Patient not taking: Reported on 10/9/2023) 30 capsule 1    lactulose (CHRONULAC) 10 gram/15 mL solution SMARTSIG:Milliliter(s) By Mouth      OLANZapine (ZYPREXA) 10 MG tablet Take by mouth.      OLANZapine (ZYPREXA) 5 MG tablet Take 1 tablet (5 mg total) by mouth As instructed. Take on days 1 to 4 of chemotherapy cycle at night. (Patient not taking: Reported on 7/5/2023) 8 tablet 0    ondansetron (ZOFRAN) 8 MG tablet Take 1 tablet (8 mg total) by mouth every 8 (eight) hours as needed for Nausea. (Patient not taking: Reported on 10/9/2023) 30 tablet 1    predniSONE (DELTASONE) 20 MG tablet Take 3 (20 mg) tablets by mouth daily for 5 days (Patient not taking: Reported on 10/9/2023) 15 tablet 0    promethazine (PHENERGAN) 12.5 MG Tab Take 1 tablet (12.5 mg total) by mouth every 4 (four) hours as needed. (Patient not taking: Reported on 10/9/2023) 60 tablet 1    traMADoL (ULTRAM) 50 mg tablet Take 50 mg by mouth every 4 (four) hours.       No current facility-administered medications for this visit.     Review of patient's allergies indicates:  No Known Allergies         Physical Exam:   Blood pressure 110/73, pulse 81, temperature 98.2 °F (36.8 °C), temperature source Oral, resp. rate 20, height 5' 6" (1.676 m), weight " 86.8 kg (191 lb 6.4 oz), SpO2 97 %.     ECOG PS 0  GA: AAOx3, NAD  HEENT: NCAT, no tenderness to head with palpation.  PERRL, EOMI, good dentition, moist oral mucous membranes. One superficial oral ulcer mid region of upper gum line. No tenderness per patient report.   LYMPH: no cervical, axillary or supraclavicular adenopathy  CVS: s1s2 RRR, no M/R/G  RESP: CTA b/l, no crackles, no wheezes or rhonchi  BREAST:  Deferred  ABD: soft, NT, ND, BS+, no hepatosplenomegaly  EXT: no deformities, no pedal edema  SKIN: no rashes, warm and dry  NEURO: normal mentation, strength 5/5 on all 4 extremities, no sensory deficits      Assessment:       # Right breast invasive ductal carcinoma, triple negative  ER negative, [place] negative, HER2 IHC 0, Ki-67 85%   [id number], stage IIIC   Discussed with patient the diagnosis of triple negative breast cancer, treatment recommendations including neoadjuvant chemotherapy followed by surgery followed by adjuvant chemotherapy + radiation therapy   Reviewed PET-CT without evidence of distant metastatic disease.  Will plan for treatment with neoadjuvant carbo Taxol pembrolizumab followed by AC pembrolizumab prior to surgical resection   S/p MRI bilateral breasts   Patient is not interested in fertility preservation  She is status post tubal ligation, discuss the need for dual contraception while on chemotherapy.  Baseline Echo 60%  S/p port placement and chemo ed  Genetic counseling done, negative.  S/P Bilateral Mastecomy      Plan  Will f/u in about 5 weeks w/ cbc cmp  Continue follow-up with Dr. Burgess 10/23  Follow-up with radiation oncology 10/16/23 for XRT

## 2023-10-09 ENCOUNTER — OFFICE VISIT (OUTPATIENT)
Dept: HEMATOLOGY/ONCOLOGY | Facility: CLINIC | Age: 38
End: 2023-10-09
Payer: MEDICAID

## 2023-10-09 VITALS
HEIGHT: 66 IN | WEIGHT: 191.38 LBS | RESPIRATION RATE: 20 BRPM | OXYGEN SATURATION: 97 % | BODY MASS INDEX: 30.76 KG/M2 | DIASTOLIC BLOOD PRESSURE: 73 MMHG | TEMPERATURE: 98 F | SYSTOLIC BLOOD PRESSURE: 110 MMHG | HEART RATE: 81 BPM

## 2023-10-09 DIAGNOSIS — Z17.1 MALIGNANT NEOPLASM OF UPPER-OUTER QUADRANT OF RIGHT BREAST IN FEMALE, ESTROGEN RECEPTOR NEGATIVE: Primary | ICD-10-CM

## 2023-10-09 DIAGNOSIS — C50.411 MALIGNANT NEOPLASM OF UPPER-OUTER QUADRANT OF RIGHT BREAST IN FEMALE, ESTROGEN RECEPTOR NEGATIVE: Primary | ICD-10-CM

## 2023-10-09 PROCEDURE — 1159F PR MEDICATION LIST DOCUMENTED IN MEDICAL RECORD: ICD-10-PCS | Mod: CPTII,,,

## 2023-10-09 PROCEDURE — 1159F MED LIST DOCD IN RCRD: CPT | Mod: CPTII,,,

## 2023-10-09 PROCEDURE — 3008F PR BODY MASS INDEX (BMI) DOCUMENTED: ICD-10-PCS | Mod: CPTII,,,

## 2023-10-09 PROCEDURE — 3074F SYST BP LT 130 MM HG: CPT | Mod: CPTII,,,

## 2023-10-09 PROCEDURE — 99214 OFFICE O/P EST MOD 30 MIN: CPT | Mod: ,,,

## 2023-10-09 PROCEDURE — 3008F BODY MASS INDEX DOCD: CPT | Mod: CPTII,,,

## 2023-10-09 PROCEDURE — 3074F PR MOST RECENT SYSTOLIC BLOOD PRESSURE < 130 MM HG: ICD-10-PCS | Mod: CPTII,,,

## 2023-10-09 PROCEDURE — 3078F PR MOST RECENT DIASTOLIC BLOOD PRESSURE < 80 MM HG: ICD-10-PCS | Mod: CPTII,,,

## 2023-10-09 PROCEDURE — 3078F DIAST BP <80 MM HG: CPT | Mod: CPTII,,,

## 2023-10-09 PROCEDURE — 99214 PR OFFICE/OUTPT VISIT, EST, LEVL IV, 30-39 MIN: ICD-10-PCS | Mod: ,,,

## 2023-10-09 RX ORDER — PROMETHAZINE HYDROCHLORIDE AND DEXTROMETHORPHAN HYDROBROMIDE 6.25; 15 MG/5ML; MG/5ML
SYRUP ORAL
COMMUNITY
Start: 2023-09-27

## 2023-11-10 NOTE — PROGRESS NOTES
Referring provider:  Dr. Burgess  Reason for consultation:  Triple negative right breast cancer     Diagnosis :   Right breast invasive ductal carcinoma, triple negative  ER negative, [place] negative, HER2 IHC 0, Ki-67 85%   [id number], stage IIIC     Past Treatment  02/02/2023-7/26/23:  Weekly Carbo Taxol plus q. 3 weeks pembrolizumab followed by q. 3 weeks AC plus pembro     Current Treatment:  Radiation started on 10/24/23   25 cycles planned  Last treatment planned for 11/29/23    HPI  Patient is a 37-year-old with no significant medical history who noticed a mass right breast in November 2022 which led to further workup including mammogram, ultrasound and biopsy, pathology from biopsy in January 2023 revealed triple negative breast cancer, grade 3.  Patient had further workup including a PET-CT with Dr. Burgess not reveal any distant metastatic disease.  She presented to our clinic on 01/23/2023 to establish care for further disease management.      Patient denies any history of smoking, alcohol use or recreational drug use.  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  She has an ECOG of 0.  She has a mother and sister accompanied to her initial visit.  Family history of lung cancer in her grandfather and liver cancer in 1 of her uncles.  No history of breast cancer in the family.    Interval History:     11/13/2023, Currently undergoing radiation started 10/24/23, 25 cycles planned. She is doing well. She denies any fevers or hot flashes.  She denies any tingling numbness, mouth sores, diarrhea.  She denies any new medications, ER or hospital visits since last follow-up visit with us. Had a scan at [facility] 10/23 will request records.     LABS / IMAGING        Pathology  01/03/2023 right breast core biopsy and right axillary lymph node core biopsy:  Invasive ductal carcinoma, grade 3, right axillary lymph node biopsy with fragments of poorly differentiated malignant  tumor consistent with ductal carcinoma.  Normal lymph node identified.  ER negative, [place] negative, HER2 IHC 0, Ki-67 85.3%.    23 Right Breast radical mastectomy- Right breast found to have no residual invasive or in situ carcinoma. Benign breast Parenchyma with changes consistent with previous biopsy, fibrosis and ductal hyperplasia without atypia. All margins negative for invasive and in situ carcinoma. 3 lymph nodes excised and found to be negative for metastatic carcinoma.     Imagin2023 CT abdomen pelvis with contrast:  No evidence of neoplastic disease, small umbilical hernia.    2022 echo EF 60%     01/10/2023 PET-CT:  Hypermetabolic mass in the superior right breast in keeping with the known cancer popliteal additional areas of mild-to-moderate uptake in the superior and retroareolar right breast are understood with definite discrete mass on CT but worrisome for infiltrative breast cancer.  Multiple hypermetabolic right axillary subpectoral and right internal mammary lymph nodes consistent with metastatic disease.  No other suspicious uptake.  Right breast mass measures 3 by 2.8 cm with an SUV of 13.7.  Inferior to the mass there was mild diffuse uptake associated with non-mass like dense breast tissue with an SUV of 4.0.  There is also abnormal uptake in the retroareolar right breast with definitive discrete mass can not be  from the dense glandular tissue with an SUV of 5.7 and measuring 1.5 x 1.1 cm.  These are concerning for infiltrative malignancies.    Had a scan at [facility] 10/23 will request records.       Laboratory     23: cr 0.78, alb 3.7, ca 9.3, LFTs WNL, TSH 1.5, wbc 8, hgb 11.1, plt 227, ANC 6.08  10/09/23: cr 0.82, alb 3.2, ca 8.9, LFTs WNL, TSH 1.5, wbc 6.93, hgb 9.8, plt 258, ANC 3.10  23: CMP WNL, wbc 3.83, hgb 9.4, plt 297, ANC 1.55, mag 1.8, phosphorus 4.2  2023: CMP unremarkable, TSH 0.50, cortisol 8.6, wbc 5.83, hgb 9.7, plt 307, ANC  3.57  06/12/2023:  Creatinine 0.87, albumin 3.7, LFTs within normal limits, calcium 9.5, magnesium 1.8, phosphorus 4.2, TSH 0.33, WBC count 3.9, hemoglobin 9.6, MCV 95, platelet count 327, ANC 1.87.   05/22/23 cr 0.81, alb 3.8, ca 9.6, LFTs WNL, mag 1.8, phosphorus 3.8, wbc 6.99, hgb 9.6, plt 297, ANC 3.95  05/01/23: Cr 0.85, alb 3.8, ca 9.3, AST 45, ALT 70, mag 1.7, phosphorus 3.6, wbc 4.85, hgb 10.2, plt 197, ANC 2.20  04/24/2023:  Creatinine 0.8, albumin 3.6, alkaline phosphatase 77, total bili 0.4, AST 39, ALT 87, magnesium 1.7, phosphorus 3.3, WBC count 9.6, ANC 4.9, platelet count 196, hemoglobin 9.6.  3/23/23: cr 0.79 , Mg 1.7 TSH 1.8  AST 32  wbc 7.92 hgb 10.0 Plt 215  03/15/23: cr 0.81, TB 0.7, mag 2.0, TSH 0.7533, cortisol 17.9, , , wbc 4.62, hgb 11.1, plt 238, ANC 1.89, UPT neg  03/08/2023:  Creatinine 0.83, AST 73, , bilirubin 0.5, TSH 0.75, WBC 5.7, hemoglobin 11, platelets 222  03/02/2023:  WBC count 5.17, hemoglobin 10.2, MCV 91.1, platelet count 265, ANC 1.89, urine pregnancy negative, creatinine 0.9, albu3.5, ca 9.2, alkaline phosphatase 76, total bilirubin 0.3, AST 53, , magnesium 1.8, phosphorus 2.7, TSH 0.9.  02/22/2023: WBC 3.02, Hgb 10.9, MCV 88.0, , ANC 1.4 3, UPT negative, creatinine 0.82 albumin 3.7 magnesium 2.1 phosphorus 2.1, TSH 0.6540, cortisol 8.7  02/15/23 Cr 0.83, alb 3.7, LFTs WNL, mag 1.7, phosphorus 2.0, TSH 0.4434, cortisol 10.6, wbc 3.32, hgb 11.3, plt 266, ANC 1.42 UPT negative   02/08/23: cr 0.79, alb 3.5, ca 9.3, AST 36, ALT 45, mag 1.9, phosphorus 2.1, wbc 4.47, hgb 11.1, plt 264, ANC 2.28  02/01/2023:  Creatinine 0.83, albumin 3.7, LFTs within normal limits, TSH 1.09, cortisol 8.8, free T4 0.98, WBC 7.7, Hb 11.5, MCV 87.9, platelet count 283, ANC 4.09, urine pregnancy negative.    Past Medical History:   Diagnosis Date    Breast cancer     GERD (gastroesophageal reflux disease)      Past Surgical History:   Procedure Laterality Date     ABLATION      MEDIPORT INSERTION, DOUBLE Left 01/26/2023    TUBAL LIGATION         Family History   Problem Relation Age of Onset    Diabetes Maternal Grandmother     Lung cancer Maternal Grandfather     Liver cancer Maternal Uncle        Social History     Socioeconomic History    Marital status: Single   Tobacco Use    Smoking status: Never    Smokeless tobacco: Never   Substance and Sexual Activity    Alcohol use: Yes     Comment: occasionally    Drug use: Never    Sexual activity: Yes     Partners: Male       Current Outpatient Medications   Medication Sig Dispense Refill    boric acid (PH-D) 600 mg vaginal suppository INSERT 1 SUPPOSITORY VAGINALLY DAILY AS NEEDED      hydrOXYzine pamoate (VISTARIL) 25 MG Cap Take 1 capsule (25 mg total) by mouth every 8 (eight) hours as needed (itching). (Patient taking differently: Take 25 mg by mouth as needed.) 30 capsule 1    LINZESS 290 mcg Cap capsule Take 290 mcg by mouth.      dexAMETHasone (DECADRON) 4 MG Tab Take 5 tablets by mouth at 12 hours and 6 hours prior to chemotherapy. (Patient not taking: Reported on 10/9/2023) 10 tablet 5    lactulose (CHRONULAC) 10 gram/15 mL solution SMARTSIG:Milliliter(s) By Mouth      OLANZapine (ZYPREXA) 10 MG tablet Take by mouth.      OLANZapine (ZYPREXA) 5 MG tablet Take 1 tablet (5 mg total) by mouth As instructed. Take on days 1 to 4 of chemotherapy cycle at night. (Patient not taking: Reported on 7/5/2023) 8 tablet 0    ondansetron (ZOFRAN) 8 MG tablet Take 1 tablet (8 mg total) by mouth every 8 (eight) hours as needed for Nausea. (Patient not taking: Reported on 10/9/2023) 30 tablet 1    predniSONE (DELTASONE) 20 MG tablet Take 3 (20 mg) tablets by mouth daily for 5 days (Patient not taking: Reported on 10/9/2023) 15 tablet 0    promethazine (PHENERGAN) 12.5 MG Tab Take 1 tablet (12.5 mg total) by mouth every 4 (four) hours as needed. (Patient not taking: Reported on 10/9/2023) 60 tablet 1    promethazine-dextromethorphan  "(PROMETHAZINE-DM) 6.25-15 mg/5 mL Syrp Take by mouth.      traMADoL (ULTRAM) 50 mg tablet Take 50 mg by mouth every 4 (four) hours.       No current facility-administered medications for this visit.     Review of patient's allergies indicates:  No Known Allergies         Physical Exam:   Blood pressure 116/78, pulse 78, temperature 98.2 °F (36.8 °C), temperature source Oral, resp. rate 20, height 5' 6" (1.676 m), weight 87.1 kg (192 lb), SpO2 100 %.     ECOG PS 0  GA: AAOx3, NAD  HEENT: NCAT, no tenderness to head with palpation.  PERRL, EOMI, good dentition, moist oral mucous membranes. One superficial oral ulcer mid region of upper gum line. No tenderness per patient report.   LYMPH: no cervical, axillary or supraclavicular adenopathy  CVS: s1s2 RRR, no M/R/G  RESP: CTA b/l, no crackles, no wheezes or rhonchi  BREAST:  Deferred  ABD: soft, NT, ND, BS+, no hepatosplenomegaly  EXT: no deformities, no pedal edema  SKIN: no rashes, warm and dry  NEURO: normal mentation, strength 5/5 on all 4 extremities, no sensory deficits      Assessment:       # Right breast invasive ductal carcinoma, triple negative  ER negative, [place] negative, HER2 IHC 0, Ki-67 85%   [id number], stage IIIC   Discussed with patient the diagnosis of triple negative breast cancer, treatment recommendations including neoadjuvant chemotherapy followed by surgery followed by adjuvant chemotherapy + radiation therapy   Reviewed PET-CT without evidence of distant metastatic disease.  Will plan for treatment with neoadjuvant carbo Taxol pembrolizumab followed by AC pembrolizumab prior to surgical resection   S/p MRI bilateral breasts   Patient is not interested in fertility preservation  She is status post tubal ligation, discuss the need for dual contraception while on chemotherapy.  Baseline Echo 60%  S/p port placement and chemo ed  Genetic counseling done, negative.  S/P Bilateral Mastecomy      Plan  Will f/u in about 3 weeks (week of 12/4/23) to discuss " plan post radiation and resuming adjuvant pembrolizumab X 9 cycles.  Radiation is complete 11/29/23.  CBC, CMP, TSH prior to visit.  Had a scan at [facility] 10/23 will request records.

## 2023-11-13 ENCOUNTER — OFFICE VISIT (OUTPATIENT)
Dept: HEMATOLOGY/ONCOLOGY | Facility: CLINIC | Age: 38
End: 2023-11-13
Payer: MEDICAID

## 2023-11-13 VITALS
HEART RATE: 78 BPM | HEIGHT: 66 IN | SYSTOLIC BLOOD PRESSURE: 116 MMHG | DIASTOLIC BLOOD PRESSURE: 78 MMHG | WEIGHT: 192 LBS | BODY MASS INDEX: 30.86 KG/M2 | TEMPERATURE: 98 F | RESPIRATION RATE: 20 BRPM | OXYGEN SATURATION: 100 %

## 2023-11-13 DIAGNOSIS — Z17.1 MALIGNANT NEOPLASM OF UPPER-OUTER QUADRANT OF RIGHT BREAST IN FEMALE, ESTROGEN RECEPTOR NEGATIVE: Primary | ICD-10-CM

## 2023-11-13 DIAGNOSIS — C50.411 MALIGNANT NEOPLASM OF UPPER-OUTER QUADRANT OF RIGHT BREAST IN FEMALE, ESTROGEN RECEPTOR NEGATIVE: Primary | ICD-10-CM

## 2023-11-13 PROCEDURE — 3078F PR MOST RECENT DIASTOLIC BLOOD PRESSURE < 80 MM HG: ICD-10-PCS | Mod: CPTII,,,

## 2023-11-13 PROCEDURE — 3074F PR MOST RECENT SYSTOLIC BLOOD PRESSURE < 130 MM HG: ICD-10-PCS | Mod: CPTII,,,

## 2023-11-13 PROCEDURE — 3074F SYST BP LT 130 MM HG: CPT | Mod: CPTII,,,

## 2023-11-13 PROCEDURE — 99214 PR OFFICE/OUTPT VISIT, EST, LEVL IV, 30-39 MIN: ICD-10-PCS | Mod: ,,,

## 2023-11-13 PROCEDURE — 3008F BODY MASS INDEX DOCD: CPT | Mod: CPTII,,,

## 2023-11-13 PROCEDURE — 3078F DIAST BP <80 MM HG: CPT | Mod: CPTII,,,

## 2023-11-13 PROCEDURE — 3008F PR BODY MASS INDEX (BMI) DOCUMENTED: ICD-10-PCS | Mod: CPTII,,,

## 2023-11-13 PROCEDURE — 1159F PR MEDICATION LIST DOCUMENTED IN MEDICAL RECORD: ICD-10-PCS | Mod: CPTII,,,

## 2023-11-13 PROCEDURE — 1159F MED LIST DOCD IN RCRD: CPT | Mod: CPTII,,,

## 2023-11-13 PROCEDURE — 99214 OFFICE O/P EST MOD 30 MIN: CPT | Mod: ,,,

## 2023-12-04 ENCOUNTER — OFFICE VISIT (OUTPATIENT)
Dept: HEMATOLOGY/ONCOLOGY | Facility: CLINIC | Age: 38
End: 2023-12-04
Payer: MEDICAID

## 2023-12-04 VITALS
TEMPERATURE: 99 F | OXYGEN SATURATION: 99 % | HEART RATE: 71 BPM | SYSTOLIC BLOOD PRESSURE: 110 MMHG | HEIGHT: 66 IN | BODY MASS INDEX: 31.1 KG/M2 | RESPIRATION RATE: 18 BRPM | DIASTOLIC BLOOD PRESSURE: 73 MMHG | WEIGHT: 193.5 LBS

## 2023-12-04 DIAGNOSIS — C50.411 MALIGNANT NEOPLASM OF UPPER-OUTER QUADRANT OF RIGHT BREAST IN FEMALE, ESTROGEN RECEPTOR NEGATIVE: Primary | ICD-10-CM

## 2023-12-04 DIAGNOSIS — Z17.1 MALIGNANT NEOPLASM OF UPPER-OUTER QUADRANT OF RIGHT BREAST IN FEMALE, ESTROGEN RECEPTOR NEGATIVE: Primary | ICD-10-CM

## 2023-12-04 PROCEDURE — 3074F PR MOST RECENT SYSTOLIC BLOOD PRESSURE < 130 MM HG: ICD-10-PCS | Mod: CPTII,,, | Performed by: STUDENT IN AN ORGANIZED HEALTH CARE EDUCATION/TRAINING PROGRAM

## 2023-12-04 PROCEDURE — 1159F MED LIST DOCD IN RCRD: CPT | Mod: CPTII,,, | Performed by: STUDENT IN AN ORGANIZED HEALTH CARE EDUCATION/TRAINING PROGRAM

## 2023-12-04 PROCEDURE — 3008F PR BODY MASS INDEX (BMI) DOCUMENTED: ICD-10-PCS | Mod: CPTII,,, | Performed by: STUDENT IN AN ORGANIZED HEALTH CARE EDUCATION/TRAINING PROGRAM

## 2023-12-04 PROCEDURE — 3078F DIAST BP <80 MM HG: CPT | Mod: CPTII,,, | Performed by: STUDENT IN AN ORGANIZED HEALTH CARE EDUCATION/TRAINING PROGRAM

## 2023-12-04 PROCEDURE — 3008F BODY MASS INDEX DOCD: CPT | Mod: CPTII,,, | Performed by: STUDENT IN AN ORGANIZED HEALTH CARE EDUCATION/TRAINING PROGRAM

## 2023-12-04 PROCEDURE — 3078F PR MOST RECENT DIASTOLIC BLOOD PRESSURE < 80 MM HG: ICD-10-PCS | Mod: CPTII,,, | Performed by: STUDENT IN AN ORGANIZED HEALTH CARE EDUCATION/TRAINING PROGRAM

## 2023-12-04 PROCEDURE — 3074F SYST BP LT 130 MM HG: CPT | Mod: CPTII,,, | Performed by: STUDENT IN AN ORGANIZED HEALTH CARE EDUCATION/TRAINING PROGRAM

## 2023-12-04 PROCEDURE — 1159F PR MEDICATION LIST DOCUMENTED IN MEDICAL RECORD: ICD-10-PCS | Mod: CPTII,,, | Performed by: STUDENT IN AN ORGANIZED HEALTH CARE EDUCATION/TRAINING PROGRAM

## 2023-12-04 PROCEDURE — 99214 PR OFFICE/OUTPT VISIT, EST, LEVL IV, 30-39 MIN: ICD-10-PCS | Mod: ,,, | Performed by: STUDENT IN AN ORGANIZED HEALTH CARE EDUCATION/TRAINING PROGRAM

## 2023-12-04 PROCEDURE — 99214 OFFICE O/P EST MOD 30 MIN: CPT | Mod: ,,, | Performed by: STUDENT IN AN ORGANIZED HEALTH CARE EDUCATION/TRAINING PROGRAM

## 2023-12-04 NOTE — PROGRESS NOTES
Referring provider:  Dr. Burgess  Reason for consultation:  Triple negative right breast cancer     Diagnosis :     Right breast invasive ductal carcinoma, triple negative  ER negative, IL negative, HER2 IHC 0, Ki-67 85%   [id number], stage IIIC     Treatment history  02/02/2023-7/26/23:  Weekly Carbo Taxol plus q. 3 weeks pembrolizumab followed by q. 3 weeks AC plus pembro   09/12/2023 - Bilateral mastectomy  10/24/23-11/29/23: Adjuvant radiation therapy    Current Treatment:  Plan to initiate adjuvant pembrolizumab on 12/18/2023      HPI  Patient is a 37-year-old with no significant medical history who noticed a mass right breast in November 2022 which led to further workup including mammogram, ultrasound and biopsy, pathology from biopsy in January 2023 revealed triple negative breast cancer, grade 3.  Patient had further workup including a PET-CT with Dr. Burgess not reveal any distant metastatic disease.  She presented to our clinic on 01/23/2023 to establish care for further disease management.      Patient denies any history of smoking, alcohol use or recreational drug use.  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  She has an ECOG of 0.  She has a mother and sister accompanied to her initial visit.  Family history of lung cancer in her grandfather and liver cancer in 1 of her uncles.  No history of breast cancer in the family.    Interval History:     Today, 12/04/2023, patient reports redness and peeling of skin around her radiation therapy site.  She denies any new foci of pain, decreased appetite, weight loss, fatigue.  She denies any fevers, chills.       Pathology  01/03/2023 right breast core biopsy and right axillary lymph node core biopsy:  Invasive ductal carcinoma, grade 3, right axillary lymph node biopsy with fragments of poorly differentiated malignant tumor consistent with ductal carcinoma.  Normal lymph node identified.  ER negative, IL  negative, HER2 IHC 0, Ki-67 85.3%.    23 Right Breast radical mastectomy- Right breast found to have no residual invasive or in situ carcinoma. Benign breast Parenchyma with changes consistent with previous biopsy, fibrosis and ductal hyperplasia without atypia. All margins negative for invasive and in situ carcinoma. 3 lymph nodes excised and found to be negative for metastatic carcinoma.     Imagin2023 CT abdomen pelvis with contrast:  No evidence of neoplastic disease, small umbilical hernia.    2022 echo EF 60%     01/10/2023 PET-CT:  Hypermetabolic mass in the superior right breast in keeping with the known cancer popliteal additional areas of mild-to-moderate uptake in the superior and retroareolar right breast are understood with definite discrete mass on CT but worrisome for infiltrative breast cancer.  Multiple hypermetabolic right axillary subpectoral and right internal mammary lymph nodes consistent with metastatic disease.  No other suspicious uptake.  Right breast mass measures 3 by 2.8 cm with an SUV of 13.7.  Inferior to the mass there was mild diffuse uptake associated with non-mass like dense breast tissue with an SUV of 4.0.  There is also abnormal uptake in the retroareolar right breast with definitive discrete mass can not be  from the dense glandular tissue with an SUV of 5.7 and measuring 1.5 x 1.1 cm.  These are concerning for infiltrative malignancies.        Laboratory       2023 CMP unremarkable, WBC count 3.46, hemoglobin 11.6, platelet count 2 1 1.  TSH 1.9.  23: cr 0.78, alb 3.7, ca 9.3, LFTs WNL, TSH 1.5, wbc 8, hgb 11.1, plt 227, ANC 6.08  10/09/23: cr 0.82, alb 3.2, ca 8.9, LFTs WNL, TSH 1.5, wbc 6.93, hgb 9.8, plt 258, ANC 3.10  23: CMP WNL, wbc 3.83, hgb 9.4, plt 297, ANC 1.55, mag 1.8, phosphorus 4.2  2023: CMP unremarkable, TSH 0.50, cortisol 8.6, wbc 5.83, hgb 9.7, plt 307, ANC 3.57  2023:  Creatinine 0.87, albumin  3.7, LFTs within normal limits, calcium 9.5, magnesium 1.8, phosphorus 4.2, TSH 0.33, WBC count 3.9, hemoglobin 9.6, MCV 95, platelet count 327, ANC 1.87.   05/22/23 cr 0.81, alb 3.8, ca 9.6, LFTs WNL, mag 1.8, phosphorus 3.8, wbc 6.99, hgb 9.6, plt 297, ANC 3.95  05/01/23: Cr 0.85, alb 3.8, ca 9.3, AST 45, ALT 70, mag 1.7, phosphorus 3.6, wbc 4.85, hgb 10.2, plt 197, ANC 2.20  04/24/2023:  Creatinine 0.8, albumin 3.6, alkaline phosphatase 77, total bili 0.4, AST 39, ALT 87, magnesium 1.7, phosphorus 3.3, WBC count 9.6, ANC 4.9, platelet count 196, hemoglobin 9.6.  3/23/23: cr 0.79 , Mg 1.7 TSH 1.8  AST 32  wbc 7.92 hgb 10.0 Plt 215  03/15/23: cr 0.81, TB 0.7, mag 2.0, TSH 0.7533, cortisol 17.9, , , wbc 4.62, hgb 11.1, plt 238, ANC 1.89, UPT neg  03/08/2023:  Creatinine 0.83, AST 73, , bilirubin 0.5, TSH 0.75, WBC 5.7, hemoglobin 11, platelets 222  03/02/2023:  WBC count 5.17, hemoglobin 10.2, MCV 91.1, platelet count 265, ANC 1.89, urine pregnancy negative, creatinine 0.9, albu3.5, ca 9.2, alkaline phosphatase 76, total bilirubin 0.3, AST 53, , magnesium 1.8, phosphorus 2.7, TSH 0.9.  02/22/2023: WBC 3.02, Hgb 10.9, MCV 88.0, , ANC 1.4 3, UPT negative, creatinine 0.82 albumin 3.7 magnesium 2.1 phosphorus 2.1, TSH 0.6540, cortisol 8.7  02/15/23 Cr 0.83, alb 3.7, LFTs WNL, mag 1.7, phosphorus 2.0, TSH 0.4434, cortisol 10.6, wbc 3.32, hgb 11.3, plt 266, ANC 1.42 UPT negative   02/08/23: cr 0.79, alb 3.5, ca 9.3, AST 36, ALT 45, mag 1.9, phosphorus 2.1, wbc 4.47, hgb 11.1, plt 264, ANC 2.28  02/01/2023:  Creatinine 0.83, albumin 3.7, LFTs within normal limits, TSH 1.09, cortisol 8.8, free T4 0.98, WBC 7.7, Hb 11.5, MCV 87.9, platelet count 283, ANC 4.09, urine pregnancy negative.    Past Medical History:   Diagnosis Date    Breast cancer     GERD (gastroesophageal reflux disease)      Past Surgical History:   Procedure Laterality Date    ABLATION      MEDIPORT INSERTION, DOUBLE  Left 01/26/2023    TUBAL LIGATION         Family History   Problem Relation Age of Onset    Diabetes Maternal Grandmother     Lung cancer Maternal Grandfather     Liver cancer Maternal Uncle        Social History     Socioeconomic History    Marital status: Single   Tobacco Use    Smoking status: Never    Smokeless tobacco: Never   Substance and Sexual Activity    Alcohol use: Yes     Comment: occasionally    Drug use: Never    Sexual activity: Yes     Partners: Male       Current Outpatient Medications   Medication Sig Dispense Refill    boric acid (PH-D) 600 mg vaginal suppository INSERT 1 SUPPOSITORY VAGINALLY DAILY AS NEEDED      hydrOXYzine pamoate (VISTARIL) 25 MG Cap Take 1 capsule (25 mg total) by mouth every 8 (eight) hours as needed (itching). (Patient taking differently: Take 25 mg by mouth as needed.) 30 capsule 1    lactulose (CHRONULAC) 10 gram/15 mL solution SMARTSIG:Milliliter(s) By Mouth      LINZESS 290 mcg Cap capsule Take 290 mcg by mouth.      OLANZapine (ZYPREXA) 10 MG tablet Take by mouth.      promethazine-dextromethorphan (PROMETHAZINE-DM) 6.25-15 mg/5 mL Syrp Take by mouth.      traMADoL (ULTRAM) 50 mg tablet Take 50 mg by mouth every 4 (four) hours.      dexAMETHasone (DECADRON) 4 MG Tab Take 5 tablets by mouth at 12 hours and 6 hours prior to chemotherapy. (Patient not taking: Reported on 10/9/2023) 10 tablet 5    OLANZapine (ZYPREXA) 5 MG tablet Take 1 tablet (5 mg total) by mouth As instructed. Take on days 1 to 4 of chemotherapy cycle at night. (Patient not taking: Reported on 7/5/2023) 8 tablet 0    ondansetron (ZOFRAN) 8 MG tablet Take 1 tablet (8 mg total) by mouth every 8 (eight) hours as needed for Nausea. (Patient not taking: Reported on 10/9/2023) 30 tablet 1    predniSONE (DELTASONE) 20 MG tablet Take 3 (20 mg) tablets by mouth daily for 5 days (Patient not taking: Reported on 10/9/2023) 15 tablet 0    promethazine (PHENERGAN) 12.5 MG Tab Take 1 tablet (12.5 mg total) by mouth  "every 4 (four) hours as needed. (Patient not taking: Reported on 10/9/2023) 60 tablet 1     No current facility-administered medications for this visit.     Review of patient's allergies indicates:  No Known Allergies         Physical Exam:   Blood pressure 110/73, pulse 71, temperature 98.7 °F (37.1 °C), temperature source Oral, resp. rate 18, height 5' 6" (1.676 m), weight 87.8 kg (193 lb 8 oz), SpO2 99 %.     ECOG PS 0  GA: AAOx3, NAD  HEENT: NCAT, no tenderness to head with palpation.  PERRL, EOMI, good dentition, moist oral mucous membranes. One superficial oral ulcer mid region of upper gum line. No tenderness per patient report.   LYMPH: no cervical, axillary or supraclavicular adenopathy  CVS: s1s2 RRR, no M/R/G  RESP: CTA b/l, no crackles, no wheezes or rhonchi  BREAST:  Deferred  ABD: soft, NT, ND, BS+, no hepatosplenomegaly  EXT: no deformities, no pedal edema  SKIN: no rashes, warm and dry  NEURO: normal mentation, strength 5/5 on all 4 extremities, no sensory deficits      Assessment:       # Right breast invasive ductal carcinoma, triple negative  ER negative, CO negative, HER2 IHC 0, Ki-67 85%   [id number], stage IIIC   Discussed with patient the diagnosis of triple negative breast cancer, treatment recommendations including neoadjuvant chemotherapy followed by surgery followed by adjuvant chemotherapy + radiation therapy   Reviewed PET-CT without evidence of distant metastatic disease.  Will plan for treatment with neoadjuvant carbo Taxol pembrolizumab followed by AC pembrolizumab prior to surgical resection   S/p MRI bilateral breasts   Patient is not interested in fertility preservation  She is status post tubal ligation, discuss the need for dual contraception while on chemotherapy.  Baseline Echo 60%  S/p port placement and chemo ed  Status post neoadjuvant chemotherapy and immunotherapy  Genetic counseling done, negative.  S/P Bilateral Mastectomy, with complete pathological response  Status post " adjuvant radiation therapy on 11/29/2023      Plan  Plan to initiate adjuvant pembrolizumab for 9 cycles starting 12/18/2023, labs the day prior.  Plan to follow-up in clinic prior to next cycle of pembrolizumab.      A total of  30 minutes were spent in review of records, interpretation of test, coordination of care, discussion and counseling with the patient.        Portions of the record may have been created with voice recognition software. Occasional wrong-word or sound-a-like substitutions may have occurred due to the inherent limitations of voice recognition software.

## 2023-12-18 ENCOUNTER — OFFICE VISIT (OUTPATIENT)
Dept: HEMATOLOGY/ONCOLOGY | Facility: CLINIC | Age: 38
End: 2023-12-18
Payer: MEDICAID

## 2023-12-18 VITALS
SYSTOLIC BLOOD PRESSURE: 116 MMHG | OXYGEN SATURATION: 95 % | TEMPERATURE: 99 F | HEIGHT: 66 IN | WEIGHT: 197.69 LBS | DIASTOLIC BLOOD PRESSURE: 79 MMHG | RESPIRATION RATE: 18 BRPM | HEART RATE: 78 BPM | BODY MASS INDEX: 31.77 KG/M2

## 2023-12-18 DIAGNOSIS — Z17.1 MALIGNANT NEOPLASM OF UPPER-OUTER QUADRANT OF RIGHT BREAST IN FEMALE, ESTROGEN RECEPTOR NEGATIVE: Primary | ICD-10-CM

## 2023-12-18 DIAGNOSIS — Z51.11 ENCOUNTER FOR ANTINEOPLASTIC CHEMOTHERAPY: ICD-10-CM

## 2023-12-18 DIAGNOSIS — C50.411 MALIGNANT NEOPLASM OF UPPER-OUTER QUADRANT OF RIGHT BREAST IN FEMALE, ESTROGEN RECEPTOR NEGATIVE: Primary | ICD-10-CM

## 2023-12-18 PROCEDURE — 3008F PR BODY MASS INDEX (BMI) DOCUMENTED: ICD-10-PCS | Mod: CPTII,,, | Performed by: STUDENT IN AN ORGANIZED HEALTH CARE EDUCATION/TRAINING PROGRAM

## 2023-12-18 PROCEDURE — 3078F PR MOST RECENT DIASTOLIC BLOOD PRESSURE < 80 MM HG: ICD-10-PCS | Mod: CPTII,,, | Performed by: STUDENT IN AN ORGANIZED HEALTH CARE EDUCATION/TRAINING PROGRAM

## 2023-12-18 PROCEDURE — 1159F PR MEDICATION LIST DOCUMENTED IN MEDICAL RECORD: ICD-10-PCS | Mod: CPTII,,, | Performed by: STUDENT IN AN ORGANIZED HEALTH CARE EDUCATION/TRAINING PROGRAM

## 2023-12-18 PROCEDURE — 1159F MED LIST DOCD IN RCRD: CPT | Mod: CPTII,,, | Performed by: STUDENT IN AN ORGANIZED HEALTH CARE EDUCATION/TRAINING PROGRAM

## 2023-12-18 PROCEDURE — 99215 PR OFFICE/OUTPT VISIT, EST, LEVL V, 40-54 MIN: ICD-10-PCS | Mod: ,,, | Performed by: STUDENT IN AN ORGANIZED HEALTH CARE EDUCATION/TRAINING PROGRAM

## 2023-12-18 PROCEDURE — 3074F SYST BP LT 130 MM HG: CPT | Mod: CPTII,,, | Performed by: STUDENT IN AN ORGANIZED HEALTH CARE EDUCATION/TRAINING PROGRAM

## 2023-12-18 PROCEDURE — 3008F BODY MASS INDEX DOCD: CPT | Mod: CPTII,,, | Performed by: STUDENT IN AN ORGANIZED HEALTH CARE EDUCATION/TRAINING PROGRAM

## 2023-12-18 PROCEDURE — 3078F DIAST BP <80 MM HG: CPT | Mod: CPTII,,, | Performed by: STUDENT IN AN ORGANIZED HEALTH CARE EDUCATION/TRAINING PROGRAM

## 2023-12-18 PROCEDURE — 3074F PR MOST RECENT SYSTOLIC BLOOD PRESSURE < 130 MM HG: ICD-10-PCS | Mod: CPTII,,, | Performed by: STUDENT IN AN ORGANIZED HEALTH CARE EDUCATION/TRAINING PROGRAM

## 2023-12-18 PROCEDURE — 99215 OFFICE O/P EST HI 40 MIN: CPT | Mod: ,,, | Performed by: STUDENT IN AN ORGANIZED HEALTH CARE EDUCATION/TRAINING PROGRAM

## 2023-12-18 RX ORDER — EPINEPHRINE 0.3 MG/.3ML
0.3 INJECTION SUBCUTANEOUS ONCE AS NEEDED
Status: CANCELLED | OUTPATIENT
Start: 2023-12-18

## 2023-12-18 RX ORDER — HEPARIN 100 UNIT/ML
500 SYRINGE INTRAVENOUS
Status: CANCELLED | OUTPATIENT
Start: 2023-12-18

## 2023-12-18 RX ORDER — DIPHENHYDRAMINE HYDROCHLORIDE 50 MG/ML
50 INJECTION INTRAMUSCULAR; INTRAVENOUS ONCE AS NEEDED
Status: CANCELLED | OUTPATIENT
Start: 2023-12-18

## 2023-12-18 RX ORDER — SODIUM CHLORIDE 0.9 % (FLUSH) 0.9 %
10 SYRINGE (ML) INJECTION
Status: CANCELLED | OUTPATIENT
Start: 2023-12-18

## 2023-12-18 NOTE — PROGRESS NOTES
Referring provider:  Dr. Burgess  Reason for consultation:  Triple negative right breast cancer     Diagnosis:     Right breast invasive ductal carcinoma, triple negative  ER negative, MD negative, HER2 IHC 0, Ki-67 85%   [id number], stage IIIC     Treatment history:    02/02/2023-7/26/23:  Weekly Carbo Taxol plus q. 3 weeks pembrolizumab followed by q. 3 weeks AC plus pembro   09/12/2023 - Bilateral mastectomy  10/24/23-11/29/23: Adjuvant radiation therapy    Current Treatment:    12/18/2023- current: Pembrolizumab      HPI  Patient is a 37-year-old with no significant medical history who noticed a mass right breast in November 2022 which led to further workup including mammogram, ultrasound and biopsy, pathology from biopsy in January 2023 revealed triple negative breast cancer, grade 3.  Patient had further workup including a PET-CT with Dr. Burgess not reveal any distant metastatic disease.  She presented to our clinic on 01/23/2023 to establish care for further disease management.      Patient denies any history of smoking, alcohol use or recreational drug use.  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  She has an ECOG of 0.  She has a mother and sister accompanied to her initial visit.  Family history of lung cancer in her grandfather and liver cancer in 1 of her uncles.  No history of breast cancer in the family.    Interval History:     Today, 12/18/23, patient denies any acute concerns today.  She denies any new lumps or bumps, decreased appetite, weight loss, fevers, chills, drenching night sweats, shortness of breath.  She denies any new foci of pain.  She denies any new medications, ER or hospital visits since last follow-up with us.    Pathology  01/03/2023 right breast core biopsy and right axillary lymph node core biopsy:  Invasive ductal carcinoma, grade 3, right axillary lymph node biopsy with fragments of poorly differentiated malignant tumor  consistent with ductal carcinoma.  Normal lymph node identified.  ER negative, ID negative, HER2 IHC 0, Ki-67 85.3%.    23 Right Breast radical mastectomy- Right breast found to have no residual invasive or in situ carcinoma. Benign breast Parenchyma with changes consistent with previous biopsy, fibrosis and ductal hyperplasia without atypia. All margins negative for invasive and in situ carcinoma. 3 lymph nodes excised and found to be negative for metastatic carcinoma.     Imagin2023 CT abdomen pelvis with contrast:  No evidence of neoplastic disease, small umbilical hernia.    2022 echo EF 60%     01/10/2023 PET-CT:  Hypermetabolic mass in the superior right breast in keeping with the known cancer popliteal additional areas of mild-to-moderate uptake in the superior and retroareolar right breast are understood with definite discrete mass on CT but worrisome for infiltrative breast cancer.  Multiple hypermetabolic right axillary subpectoral and right internal mammary lymph nodes consistent with metastatic disease.  No other suspicious uptake.  Right breast mass measures 3 by 2.8 cm with an SUV of 13.7.  Inferior to the mass there was mild diffuse uptake associated with non-mass like dense breast tissue with an SUV of 4.0.  There is also abnormal uptake in the retroareolar right breast with definitive discrete mass can not be  from the dense glandular tissue with an SUV of 5.7 and measuring 1.5 x 1.1 cm.  These are concerning for infiltrative malignancies.        Laboratory     23: CMP unremarkable, TSH 0.514, cortisol 11.9, WBC 5.89, Hb 12.1, , ANC 3.59    2023 CMP unremarkable, WBC count 3.46, hemoglobin 11.6, platelet count 2 1 1.  TSH 1.9.  23: cr 0.78, alb 3.7, ca 9.3, LFTs WNL, TSH 1.5, wbc 8, hgb 11.1, plt 227, ANC 6.08  10/09/23: cr 0.82, alb 3.2, ca 8.9, LFTs WNL, TSH 1.5, wbc 6.93, hgb 9.8, plt 258, ANC 3.10  23: CMP WNL, wbc 3.83, hgb 9.4,  plt 297, ANC 1.55, mag 1.8, phosphorus 4.2  07/04/2023: CMP unremarkable, TSH 0.50, cortisol 8.6, wbc 5.83, hgb 9.7, plt 307, ANC 3.57  06/12/2023:  Creatinine 0.87, albumin 3.7, LFTs within normal limits, calcium 9.5, magnesium 1.8, phosphorus 4.2, TSH 0.33, WBC count 3.9, hemoglobin 9.6, MCV 95, platelet count 327, ANC 1.87.   05/22/23 cr 0.81, alb 3.8, ca 9.6, LFTs WNL, mag 1.8, phosphorus 3.8, wbc 6.99, hgb 9.6, plt 297, ANC 3.95  05/01/23: Cr 0.85, alb 3.8, ca 9.3, AST 45, ALT 70, mag 1.7, phosphorus 3.6, wbc 4.85, hgb 10.2, plt 197, ANC 2.20  04/24/2023:  Creatinine 0.8, albumin 3.6, alkaline phosphatase 77, total bili 0.4, AST 39, ALT 87, magnesium 1.7, phosphorus 3.3, WBC count 9.6, ANC 4.9, platelet count 196, hemoglobin 9.6.  3/23/23: cr 0.79 , Mg 1.7 TSH 1.8  AST 32  wbc 7.92 hgb 10.0 Plt 215  03/15/23: cr 0.81, TB 0.7, mag 2.0, TSH 0.7533, cortisol 17.9, , , wbc 4.62, hgb 11.1, plt 238, ANC 1.89, UPT neg  03/08/2023:  Creatinine 0.83, AST 73, , bilirubin 0.5, TSH 0.75, WBC 5.7, hemoglobin 11, platelets 222  03/02/2023:  WBC count 5.17, hemoglobin 10.2, MCV 91.1, platelet count 265, ANC 1.89, urine pregnancy negative, creatinine 0.9, albu3.5, ca 9.2, alkaline phosphatase 76, total bilirubin 0.3, AST 53, , magnesium 1.8, phosphorus 2.7, TSH 0.9.  02/22/2023: WBC 3.02, Hgb 10.9, MCV 88.0, , ANC 1.4 3, UPT negative, creatinine 0.82 albumin 3.7 magnesium 2.1 phosphorus 2.1, TSH 0.6540, cortisol 8.7  02/15/23 Cr 0.83, alb 3.7, LFTs WNL, mag 1.7, phosphorus 2.0, TSH 0.4434, cortisol 10.6, wbc 3.32, hgb 11.3, plt 266, ANC 1.42 UPT negative   02/08/23: cr 0.79, alb 3.5, ca 9.3, AST 36, ALT 45, mag 1.9, phosphorus 2.1, wbc 4.47, hgb 11.1, plt 264, ANC 2.28  02/01/2023:  Creatinine 0.83, albumin 3.7, LFTs within normal limits, TSH 1.09, cortisol 8.8, free T4 0.98, WBC 7.7, Hb 11.5, MCV 87.9, platelet count 283, ANC 4.09, urine pregnancy negative.    Past Medical History:    Diagnosis Date    Breast cancer     GERD (gastroesophageal reflux disease)      Past Surgical History:   Procedure Laterality Date    ABLATION      MEDIPORT INSERTION, DOUBLE Left 01/26/2023    TUBAL LIGATION         Family History   Problem Relation Age of Onset    Diabetes Maternal Grandmother     Lung cancer Maternal Grandfather     Liver cancer Maternal Uncle        Social History     Socioeconomic History    Marital status: Single   Tobacco Use    Smoking status: Never    Smokeless tobacco: Never   Substance and Sexual Activity    Alcohol use: Yes     Comment: occasionally    Drug use: Never    Sexual activity: Yes     Partners: Male       Current Outpatient Medications   Medication Sig Dispense Refill    boric acid (PH-D) 600 mg vaginal suppository INSERT 1 SUPPOSITORY VAGINALLY DAILY AS NEEDED      hydrOXYzine pamoate (VISTARIL) 25 MG Cap Take 1 capsule (25 mg total) by mouth every 8 (eight) hours as needed (itching). (Patient taking differently: Take 25 mg by mouth as needed.) 30 capsule 1    lactulose (CHRONULAC) 10 gram/15 mL solution SMARTSIG:Milliliter(s) By Mouth      LINZESS 290 mcg Cap capsule Take 290 mcg by mouth.      OLANZapine (ZYPREXA) 10 MG tablet Take by mouth.      promethazine (PHENERGAN) 12.5 MG Tab Take 1 tablet (12.5 mg total) by mouth every 4 (four) hours as needed. 60 tablet 1    promethazine-dextromethorphan (PROMETHAZINE-DM) 6.25-15 mg/5 mL Syrp Take by mouth.      traMADoL (ULTRAM) 50 mg tablet Take 50 mg by mouth every 4 (four) hours.      dexAMETHasone (DECADRON) 4 MG Tab Take 5 tablets by mouth at 12 hours and 6 hours prior to chemotherapy. (Patient not taking: Reported on 10/9/2023) 10 tablet 5    OLANZapine (ZYPREXA) 5 MG tablet Take 1 tablet (5 mg total) by mouth As instructed. Take on days 1 to 4 of chemotherapy cycle at night. (Patient not taking: Reported on 7/5/2023) 8 tablet 0    ondansetron (ZOFRAN) 8 MG tablet Take 1 tablet (8 mg total) by mouth every 8 (eight) hours  "as needed for Nausea. (Patient not taking: Reported on 10/9/2023) 30 tablet 1    predniSONE (DELTASONE) 20 MG tablet Take 3 (20 mg) tablets by mouth daily for 5 days (Patient not taking: Reported on 10/9/2023) 15 tablet 0     No current facility-administered medications for this visit.     Review of patient's allergies indicates:  No Known Allergies         Physical Exam:   Blood pressure 116/79, pulse 78, temperature 99.1 °F (37.3 °C), temperature source Oral, resp. rate 18, height 5' 6" (1.676 m), weight 89.7 kg (197 lb 11.2 oz), SpO2 95 %.     ECOG PS 0  GA: AAOx3, NAD  HEENT: NCAT, no tenderness to head with palpation. good dentition, moist oral mucous membranes.  LYMPH: no cervical, axillary or supraclavicular adenopathy  CVS: s1s2 RRR, no M/R/G  RESP: CTA b/l, no crackles, no wheezes or rhonchi  BREAST:  Deferred  ABD: soft, NT, ND, BS+, no hepatosplenomegaly  EXT: no deformities, no pedal edema  SKIN: no rashes, warm and dry  NEURO: normal mentation, strength 5/5 on all 4 extremities, no sensory deficits      Assessment:       # Right breast invasive ductal carcinoma, triple negative  ER negative, DC negative, HER2 IHC 0, Ki-67 85%   [id number], stage IIIC   Discussed with patient the diagnosis of triple negative breast cancer, treatment recommendations including neoadjuvant chemotherapy followed by surgery followed by adjuvant chemotherapy + radiation therapy   Reviewed PET-CT without evidence of distant metastatic disease.  Will plan for treatment with neoadjuvant carbo Taxol pembrolizumab followed by AC pembrolizumab prior to surgical resection   S/p MRI bilateral breasts   Patient is not interested in fertility preservation  She is status post tubal ligation, discuss the need for dual contraception while on chemotherapy.  Baseline Echo 60%  S/p port placement and chemo ed  Status post neoadjuvant chemotherapy and immunotherapy  Genetic counseling done, negative.  S/P Bilateral Mastectomy, with complete " pathological response  Status post adjuvant radiation therapy on 11/29/2023  Initiated adjuvant pembrolizumab on 12/18/2023    Plan  Reviewed labs, proceed with cycle 1 adjuvant pembrolizumab today  Plan to follow-up in 3 weeks, labs the day prior to treatment to assess treatment tolerance.          Portions of the record may have been created with voice recognition software. Occasional wrong-word or sound-a-like substitutions may have occurred due to the inherent limitations of voice recognition software.

## 2024-01-08 ENCOUNTER — OFFICE VISIT (OUTPATIENT)
Dept: HEMATOLOGY/ONCOLOGY | Facility: CLINIC | Age: 39
End: 2024-01-08
Payer: MEDICAID

## 2024-01-08 VITALS
RESPIRATION RATE: 20 BRPM | BODY MASS INDEX: 31.93 KG/M2 | TEMPERATURE: 98 F | HEART RATE: 71 BPM | SYSTOLIC BLOOD PRESSURE: 121 MMHG | HEIGHT: 66 IN | OXYGEN SATURATION: 100 % | DIASTOLIC BLOOD PRESSURE: 80 MMHG | WEIGHT: 198.69 LBS

## 2024-01-08 DIAGNOSIS — Z17.1 MALIGNANT NEOPLASM OF UPPER-OUTER QUADRANT OF RIGHT BREAST IN FEMALE, ESTROGEN RECEPTOR NEGATIVE: Primary | ICD-10-CM

## 2024-01-08 DIAGNOSIS — C50.411 MALIGNANT NEOPLASM OF UPPER-OUTER QUADRANT OF RIGHT BREAST IN FEMALE, ESTROGEN RECEPTOR NEGATIVE: Primary | ICD-10-CM

## 2024-01-08 PROCEDURE — 3008F BODY MASS INDEX DOCD: CPT | Mod: CPTII,,,

## 2024-01-08 PROCEDURE — 3079F DIAST BP 80-89 MM HG: CPT | Mod: CPTII,,,

## 2024-01-08 PROCEDURE — 3074F SYST BP LT 130 MM HG: CPT | Mod: CPTII,,,

## 2024-01-08 PROCEDURE — 99215 OFFICE O/P EST HI 40 MIN: CPT | Mod: ,,,

## 2024-01-08 PROCEDURE — 1159F MED LIST DOCD IN RCRD: CPT | Mod: CPTII,,,

## 2024-01-08 RX ORDER — EPINEPHRINE 0.3 MG/.3ML
0.3 INJECTION SUBCUTANEOUS ONCE AS NEEDED
Status: CANCELLED | OUTPATIENT
Start: 2024-01-08

## 2024-01-08 RX ORDER — HEPARIN 100 UNIT/ML
500 SYRINGE INTRAVENOUS
Status: CANCELLED | OUTPATIENT
Start: 2024-01-08

## 2024-01-08 RX ORDER — DIPHENHYDRAMINE HYDROCHLORIDE 50 MG/ML
50 INJECTION, SOLUTION INTRAMUSCULAR; INTRAVENOUS ONCE AS NEEDED
Status: CANCELLED | OUTPATIENT
Start: 2024-01-08

## 2024-01-08 RX ORDER — SODIUM CHLORIDE 0.9 % (FLUSH) 0.9 %
10 SYRINGE (ML) INJECTION
Status: CANCELLED | OUTPATIENT
Start: 2024-01-08

## 2024-01-08 NOTE — PROGRESS NOTES
Referring provider:  Dr. Burgess  Reason for consultation:  Triple negative right breast cancer     Diagnosis:     Right breast invasive ductal carcinoma, triple negative  ER negative, NV negative, HER2 IHC 0, Ki-67 85%   [id number], stage IIIC     Treatment history:    02/02/2023-7/26/23:  Weekly Carbo Taxol plus q. 3 weeks pembrolizumab followed by q. 3 weeks AC plus pembro   09/12/2023 - Bilateral mastectomy  10/24/23-11/29/23: Adjuvant radiation therapy    Current Treatment:    12/18/2023- current: Pembrolizumab      HPI  Patient is a 37-year-old with no significant medical history who noticed a mass right breast in November 2022 which led to further workup including mammogram, ultrasound and biopsy, pathology from biopsy in January 2023 revealed triple negative breast cancer, grade 3.  Patient had further workup including a PET-CT with Dr. Burgess not reveal any distant metastatic disease.  She presented to our clinic on 01/23/2023 to establish care for further disease management.      Patient denies any history of smoking, alcohol use or recreational drug use.  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  She has an ECOG of 0.  She has a mother and sister accompanied to her initial visit.  Family history of lung cancer in her grandfather and liver cancer in 1 of her uncles.  No history of breast cancer in the family.    Interval History:     Today, 01/08/2024, patient denies any acute concerns today.  She is due for cycle 2/9 of Pembrolizumab. She is having some difficulty with sleeping. She denies any new lumps or bumps, decreased appetite, weight loss, fevers, chills, drenching night sweats, shortness of breath.  She denies any new foci of pain.  She denies any new medications, ER or hospital visits since last follow-up with us.    Pathology  01/03/2023 right breast core biopsy and right axillary lymph node core biopsy:  Invasive ductal carcinoma, grade 3, right  axillary lymph node biopsy with fragments of poorly differentiated malignant tumor consistent with ductal carcinoma.  Normal lymph node identified.  ER negative, MN negative, HER2 IHC 0, Ki-67 85.3%.    23 Right Breast radical mastectomy- Right breast found to have no residual invasive or in situ carcinoma. Benign breast Parenchyma with changes consistent with previous biopsy, fibrosis and ductal hyperplasia without atypia. All margins negative for invasive and in situ carcinoma. 3 lymph nodes excised and found to be negative for metastatic carcinoma.     Imagin2023 CT abdomen pelvis with contrast:  No evidence of neoplastic disease, small umbilical hernia.    2022 echo EF 60%     01/10/2023 PET-CT:  Hypermetabolic mass in the superior right breast in keeping with the known cancer popliteal additional areas of mild-to-moderate uptake in the superior and retroareolar right breast are understood with definite discrete mass on CT but worrisome for infiltrative breast cancer.  Multiple hypermetabolic right axillary subpectoral and right internal mammary lymph nodes consistent with metastatic disease.  No other suspicious uptake.  Right breast mass measures 3 by 2.8 cm with an SUV of 13.7.  Inferior to the mass there was mild diffuse uptake associated with non-mass like dense breast tissue with an SUV of 4.0.  There is also abnormal uptake in the retroareolar right breast with definitive discrete mass can not be  from the dense glandular tissue with an SUV of 5.7 and measuring 1.5 x 1.1 cm.  These are concerning for infiltrative malignancies.        Laboratory     2024: CMP WNL, TSH 0.8660, cortisol 7.7, wbc 5.20, hgb 11.7, plt 197, ANC 2.89  23: CMP unremarkable, TSH 0.514, cortisol 11.9, WBC 5.89, Hb 12.1, , ANC 3.59    2023 CMP unremarkable, WBC count 3.46, hemoglobin 11.6, platelet count 2 1 1.  TSH 1.9.  23: cr 0.78, alb 3.7, ca 9.3, LFTs WNL, TSH 1.5,  wbc 8, hgb 11.1, plt 227, ANC 6.08  10/09/23: cr 0.82, alb 3.2, ca 8.9, LFTs WNL, TSH 1.5, wbc 6.93, hgb 9.8, plt 258, ANC 3.10  07/25/23: CMP WNL, wbc 3.83, hgb 9.4, plt 297, ANC 1.55, mag 1.8, phosphorus 4.2  07/04/2023: CMP unremarkable, TSH 0.50, cortisol 8.6, wbc 5.83, hgb 9.7, plt 307, ANC 3.57  06/12/2023:  Creatinine 0.87, albumin 3.7, LFTs within normal limits, calcium 9.5, magnesium 1.8, phosphorus 4.2, TSH 0.33, WBC count 3.9, hemoglobin 9.6, MCV 95, platelet count 327, ANC 1.87.   05/22/23 cr 0.81, alb 3.8, ca 9.6, LFTs WNL, mag 1.8, phosphorus 3.8, wbc 6.99, hgb 9.6, plt 297, ANC 3.95  05/01/23: Cr 0.85, alb 3.8, ca 9.3, AST 45, ALT 70, mag 1.7, phosphorus 3.6, wbc 4.85, hgb 10.2, plt 197, ANC 2.20  04/24/2023:  Creatinine 0.8, albumin 3.6, alkaline phosphatase 77, total bili 0.4, AST 39, ALT 87, magnesium 1.7, phosphorus 3.3, WBC count 9.6, ANC 4.9, platelet count 196, hemoglobin 9.6.  3/23/23: cr 0.79 , Mg 1.7 TSH 1.8  AST 32  wbc 7.92 hgb 10.0 Plt 215  03/15/23: cr 0.81, TB 0.7, mag 2.0, TSH 0.7533, cortisol 17.9, , , wbc 4.62, hgb 11.1, plt 238, ANC 1.89, UPT neg  03/08/2023:  Creatinine 0.83, AST 73, , bilirubin 0.5, TSH 0.75, WBC 5.7, hemoglobin 11, platelets 222  03/02/2023:  WBC count 5.17, hemoglobin 10.2, MCV 91.1, platelet count 265, ANC 1.89, urine pregnancy negative, creatinine 0.9, albu3.5, ca 9.2, alkaline phosphatase 76, total bilirubin 0.3, AST 53, , magnesium 1.8, phosphorus 2.7, TSH 0.9.  02/22/2023: WBC 3.02, Hgb 10.9, MCV 88.0, , ANC 1.4 3, UPT negative, creatinine 0.82 albumin 3.7 magnesium 2.1 phosphorus 2.1, TSH 0.6540, cortisol 8.7  02/15/23 Cr 0.83, alb 3.7, LFTs WNL, mag 1.7, phosphorus 2.0, TSH 0.4434, cortisol 10.6, wbc 3.32, hgb 11.3, plt 266, ANC 1.42 UPT negative   02/08/23: cr 0.79, alb 3.5, ca 9.3, AST 36, ALT 45, mag 1.9, phosphorus 2.1, wbc 4.47, hgb 11.1, plt 264, ANC 2.28  02/01/2023:  Creatinine 0.83, albumin 3.7, LFTs  within normal limits, TSH 1.09, cortisol 8.8, free T4 0.98, WBC 7.7, Hb 11.5, MCV 87.9, platelet count 283, ANC 4.09, urine pregnancy negative.    Past Medical History:   Diagnosis Date    Breast cancer     GERD (gastroesophageal reflux disease)      Past Surgical History:   Procedure Laterality Date    ABLATION      MEDIPORT INSERTION, DOUBLE Left 01/26/2023    TUBAL LIGATION         Family History   Problem Relation Age of Onset    Diabetes Maternal Grandmother     Lung cancer Maternal Grandfather     Liver cancer Maternal Uncle        Social History     Socioeconomic History    Marital status: Single   Tobacco Use    Smoking status: Never    Smokeless tobacco: Never   Substance and Sexual Activity    Alcohol use: Yes     Comment: occasionally    Drug use: Never    Sexual activity: Yes     Partners: Male       Current Outpatient Medications   Medication Sig Dispense Refill    boric acid (PH-D) 600 mg vaginal suppository INSERT 1 SUPPOSITORY VAGINALLY DAILY AS NEEDED      dexAMETHasone (DECADRON) 4 MG Tab Take 5 tablets by mouth at 12 hours and 6 hours prior to chemotherapy. (Patient not taking: Reported on 10/9/2023) 10 tablet 5    hydrOXYzine pamoate (VISTARIL) 25 MG Cap Take 1 capsule (25 mg total) by mouth every 8 (eight) hours as needed (itching). (Patient taking differently: Take 25 mg by mouth as needed.) 30 capsule 1    lactulose (CHRONULAC) 10 gram/15 mL solution SMARTSIG:Milliliter(s) By Mouth      LINZESS 290 mcg Cap capsule Take 290 mcg by mouth.      OLANZapine (ZYPREXA) 10 MG tablet Take by mouth.      OLANZapine (ZYPREXA) 5 MG tablet Take 1 tablet (5 mg total) by mouth As instructed. Take on days 1 to 4 of chemotherapy cycle at night. (Patient not taking: Reported on 7/5/2023) 8 tablet 0    ondansetron (ZOFRAN) 8 MG tablet Take 1 tablet (8 mg total) by mouth every 8 (eight) hours as needed for Nausea. (Patient not taking: Reported on 10/9/2023) 30 tablet 1    predniSONE (DELTASONE) 20 MG tablet Take  3 (20 mg) tablets by mouth daily for 5 days (Patient not taking: Reported on 10/9/2023) 15 tablet 0    promethazine (PHENERGAN) 12.5 MG Tab Take 1 tablet (12.5 mg total) by mouth every 4 (four) hours as needed. 60 tablet 1    promethazine-dextromethorphan (PROMETHAZINE-DM) 6.25-15 mg/5 mL Syrp Take by mouth.      traMADoL (ULTRAM) 50 mg tablet Take 50 mg by mouth every 4 (four) hours.       No current facility-administered medications for this visit.     Review of patient's allergies indicates:  No Known Allergies         Physical Exam:   There were no vitals taken for this visit.     ECOG PS 0  GA: AAOx3, NAD  HEENT: NCAT, no tenderness to head with palpation. good dentition, moist oral mucous membranes.  LYMPH: no cervical, axillary or supraclavicular adenopathy  CVS: s1s2 RRR, no M/R/G  RESP: CTA b/l, no crackles, no wheezes or rhonchi  BREAST:  Deferred  ABD: soft, NT, ND, BS+, no hepatosplenomegaly  EXT: no deformities, no pedal edema  SKIN: no rashes, warm and dry  NEURO: normal mentation, strength 5/5 on all 4 extremities, no sensory deficits      Assessment:       # Right breast invasive ductal carcinoma, triple negative  ER negative, OK negative, HER2 IHC 0, Ki-67 85%   [id number], stage IIIC   Discussed with patient the diagnosis of triple negative breast cancer, treatment recommendations including neoadjuvant chemotherapy followed by surgery followed by adjuvant chemotherapy + radiation therapy   Reviewed PET-CT without evidence of distant metastatic disease.  Will plan for treatment with neoadjuvant carbo Taxol pembrolizumab followed by AC pembrolizumab prior to surgical resection   S/p MRI bilateral breasts   Patient is not interested in fertility preservation  She is status post tubal ligation, discuss the need for dual contraception while on chemotherapy.  Baseline Echo 60%  S/p port placement and chemo ed  Status post neoadjuvant chemotherapy and immunotherapy  Genetic counseling done, negative.  S/P  Bilateral Mastectomy, with complete pathological response  Status post adjuvant radiation therapy on 11/29/2023  Initiated adjuvant pembrolizumab on 12/18/2023    Plan  Reviewed labs, proceed with cycle 2/9 adjuvant pembrolizumab today  Recommend trial of OTC natural sleep aid or contact PCP for prescription sleep aid.   Plan to follow-up in 3 weeks, labs the day prior to treatment to assess treatment tolerance.

## 2024-01-25 NOTE — PROGRESS NOTES
Referring provider:  Dr. Burgess  Reason for consultation:  Triple negative right breast cancer     Diagnosis:     Right breast invasive ductal carcinoma, triple negative  ER negative, [place] negative, HER2 IHC 0, Ki-67 85%   [id number], stage IIIC     Treatment history:    02/02/2023-7/26/23:  Weekly Carbo Taxol plus q. 3 weeks pembrolizumab followed by q. 3 weeks AC plus pembro   09/12/2023 - Bilateral mastectomy  10/24/23-11/29/23: Adjuvant radiation therapy    Current Treatment:    12/18/2023- current: Pembrolizumab      HPI  Patient is a 37-year-old with no significant medical history who noticed a mass right breast in November 2022 which led to further workup including mammogram, ultrasound and biopsy, pathology from biopsy in January 2023 revealed triple negative breast cancer, grade 3.  Patient had further workup including a PET-CT with Dr. Burgess not reveal any distant metastatic disease.  She presented to our clinic on 01/23/2023 to establish care for further disease management.      Patient denies any history of smoking, alcohol use or recreational drug use.  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  She has an ECOG of 0.  She has a mother and sister accompanied to her initial visit.  Family history of lung cancer in her grandfather and liver cancer in 1 of her uncles.  No history of breast cancer in the family.    Interval History:     Today, 01/29/2024, patient denies any acute concerns today.  She is due for cycle 3/9 of Pembrolizumab. She denies any new lumps or bumps, decreased appetite, weight loss, fevers, chills, drenching night sweats, shortness of breath.  She denies any new foci of pain.  She denies any new medications, ER or hospital visits since last follow-up with us.    Pathology  01/03/2023 right breast core biopsy and right axillary lymph node core biopsy:  Invasive ductal carcinoma, grade 3, right axillary lymph node biopsy with fragments of  poorly differentiated malignant tumor consistent with ductal carcinoma.  Normal lymph node identified.  ER negative, MT negative, HER2 IHC 0, Ki-67 85.3%.    23 Right Breast radical mastectomy- Right breast found to have no residual invasive or in situ carcinoma. Benign breast Parenchyma with changes consistent with previous biopsy, fibrosis and ductal hyperplasia without atypia. All margins negative for invasive and in situ carcinoma. 3 lymph nodes excised and found to be negative for metastatic carcinoma.     Imagin2023 CT abdomen pelvis with contrast:  No evidence of neoplastic disease, small umbilical hernia.    2022 echo EF 60%     01/10/2023 PET-CT:  Hypermetabolic mass in the superior right breast in keeping with the known cancer popliteal additional areas of mild-to-moderate uptake in the superior and retroareolar right breast are understood with definite discrete mass on CT but worrisome for infiltrative breast cancer.  Multiple hypermetabolic right axillary subpectoral and right internal mammary lymph nodes consistent with metastatic disease.  No other suspicious uptake.  Right breast mass measures 3 by 2.8 cm with an SUV of 13.7.  Inferior to the mass there was mild diffuse uptake associated with non-mass like dense breast tissue with an SUV of 4.0.  There is also abnormal uptake in the retroareolar right breast with definitive discrete mass can not be  from the dense glandular tissue with an SUV of 5.7 and measuring 1.5 x 1.1 cm.  These are concerning for infiltrative malignancies.    Laboratory   24: CMP and CBC WNL TSH 1.6687, Cortisol 9.6  2024: CMP WNL, TSH 0.8660, cortisol 7.7, wbc 5.20, hgb 11.7, plt 197, ANC 2.89  23: CMP unremarkable, TSH 0.514, cortisol 11.9, WBC 5.89, Hb 12.1, , ANC 3.59    2023 CMP unremarkable, WBC count 3.46, hemoglobin 11.6, platelet count 2 1 1.  TSH 1.9.  23: cr 0.78, alb 3.7, ca 9.3, LFTs WNL, TSH 1.5,  wbc 8, hgb 11.1, plt 227, ANC 6.08  10/09/23: cr 0.82, alb 3.2, ca 8.9, LFTs WNL, TSH 1.5, wbc 6.93, hgb 9.8, plt 258, ANC 3.10  07/25/23: CMP WNL, wbc 3.83, hgb 9.4, plt 297, ANC 1.55, mag 1.8, phosphorus 4.2  07/04/2023: CMP unremarkable, TSH 0.50, cortisol 8.6, wbc 5.83, hgb 9.7, plt 307, ANC 3.57  06/12/2023:  Creatinine 0.87, albumin 3.7, LFTs within normal limits, calcium 9.5, magnesium 1.8, phosphorus 4.2, TSH 0.33, WBC count 3.9, hemoglobin 9.6, MCV 95, platelet count 327, ANC 1.87.   05/22/23 cr 0.81, alb 3.8, ca 9.6, LFTs WNL, mag 1.8, phosphorus 3.8, wbc 6.99, hgb 9.6, plt 297, ANC 3.95  05/01/23: Cr 0.85, alb 3.8, ca 9.3, AST 45, ALT 70, mag 1.7, phosphorus 3.6, wbc 4.85, hgb 10.2, plt 197, ANC 2.20  04/24/2023:  Creatinine 0.8, albumin 3.6, alkaline phosphatase 77, total bili 0.4, AST 39, ALT 87, magnesium 1.7, phosphorus 3.3, WBC count 9.6, ANC 4.9, platelet count 196, hemoglobin 9.6.  3/23/23: cr 0.79 , Mg 1.7 TSH 1.8  AST 32  wbc 7.92 hgb 10.0 Plt 215  03/15/23: cr 0.81, TB 0.7, mag 2.0, TSH 0.7533, cortisol 17.9, , , wbc 4.62, hgb 11.1, plt 238, ANC 1.89, UPT neg  03/08/2023:  Creatinine 0.83, AST 73, , bilirubin 0.5, TSH 0.75, WBC 5.7, hemoglobin 11, platelets 222  03/02/2023:  WBC count 5.17, hemoglobin 10.2, MCV 91.1, platelet count 265, ANC 1.89, urine pregnancy negative, creatinine 0.9, albu3.5, ca 9.2, alkaline phosphatase 76, total bilirubin 0.3, AST 53, , magnesium 1.8, phosphorus 2.7, TSH 0.9.  02/22/2023: WBC 3.02, Hgb 10.9, MCV 88.0, , ANC 1.4 3, UPT negative, creatinine 0.82 albumin 3.7 magnesium 2.1 phosphorus 2.1, TSH 0.6540, cortisol 8.7  02/15/23 Cr 0.83, alb 3.7, LFTs WNL, mag 1.7, phosphorus 2.0, TSH 0.4434, cortisol 10.6, wbc 3.32, hgb 11.3, plt 266, ANC 1.42 UPT negative   02/08/23: cr 0.79, alb 3.5, ca 9.3, AST 36, ALT 45, mag 1.9, phosphorus 2.1, wbc 4.47, hgb 11.1, plt 264, ANC 2.28  02/01/2023:  Creatinine 0.83, albumin 3.7, LFTs  within normal limits, TSH 1.09, cortisol 8.8, free T4 0.98, WBC 7.7, Hb 11.5, MCV 87.9, platelet count 283, ANC 4.09, urine pregnancy negative.    Past Medical History:   Diagnosis Date    Breast cancer     GERD (gastroesophageal reflux disease)      Past Surgical History:   Procedure Laterality Date    ABLATION      MEDIPORT INSERTION, DOUBLE Left 01/26/2023    TUBAL LIGATION         Family History   Problem Relation Age of Onset    Diabetes Maternal Grandmother     Lung cancer Maternal Grandfather     Liver cancer Maternal Uncle        Social History     Socioeconomic History    Marital status: Single   Tobacco Use    Smoking status: Never    Smokeless tobacco: Never   Substance and Sexual Activity    Alcohol use: Yes     Comment: occasionally    Drug use: Never    Sexual activity: Yes     Partners: Male       Current Outpatient Medications   Medication Sig Dispense Refill    boric acid (PH-D) 600 mg vaginal suppository INSERT 1 SUPPOSITORY VAGINALLY DAILY AS NEEDED      dexAMETHasone (DECADRON) 4 MG Tab Take 5 tablets by mouth at 12 hours and 6 hours prior to chemotherapy. (Patient not taking: Reported on 10/9/2023) 10 tablet 5    hydrOXYzine pamoate (VISTARIL) 25 MG Cap Take 1 capsule (25 mg total) by mouth every 8 (eight) hours as needed (itching). (Patient taking differently: Take 25 mg by mouth as needed.) 30 capsule 1    lactulose (CHRONULAC) 10 gram/15 mL solution SMARTSIG:Milliliter(s) By Mouth      LINZESS 290 mcg Cap capsule Take 290 mcg by mouth.      OLANZapine (ZYPREXA) 10 MG tablet Take by mouth.      OLANZapine (ZYPREXA) 5 MG tablet Take 1 tablet (5 mg total) by mouth As instructed. Take on days 1 to 4 of chemotherapy cycle at night. (Patient not taking: Reported on 7/5/2023) 8 tablet 0    ondansetron (ZOFRAN) 8 MG tablet Take 1 tablet (8 mg total) by mouth every 8 (eight) hours as needed for Nausea. (Patient not taking: Reported on 10/9/2023) 30 tablet 1    predniSONE (DELTASONE) 20 MG tablet Take  3 (20 mg) tablets by mouth daily for 5 days (Patient not taking: Reported on 10/9/2023) 15 tablet 0    promethazine (PHENERGAN) 12.5 MG Tab Take 1 tablet (12.5 mg total) by mouth every 4 (four) hours as needed. 60 tablet 1    promethazine-dextromethorphan (PROMETHAZINE-DM) 6.25-15 mg/5 mL Syrp Take by mouth.      traMADoL (ULTRAM) 50 mg tablet Take 50 mg by mouth every 4 (four) hours.       No current facility-administered medications for this visit.     Review of patient's allergies indicates:  No Known Allergies         Physical Exam:   There were no vitals taken for this visit.     ECOG PS 0  GA: AAOx3, NAD  HEENT: NCAT, no tenderness to head with palpation. good dentition, moist oral mucous membranes.  LYMPH: no cervical, axillary or supraclavicular adenopathy  CVS: s1s2 RRR, no M/R/G  RESP: CTA b/l, no crackles, no wheezes or rhonchi  BREAST:  Deferred  ABD: soft, NT, ND, BS+, no hepatosplenomegaly  EXT: no deformities, no pedal edema  SKIN: no rashes, warm and dry  NEURO: normal mentation, strength 5/5 on all 4 extremities, no sensory deficits      Assessment:       # Right breast invasive ductal carcinoma, triple negative  ER negative, [place] negative, HER2 IHC 0, Ki-67 85%   [id number], stage IIIC   Discussed with patient the diagnosis of triple negative breast cancer, treatment recommendations including neoadjuvant chemotherapy followed by surgery followed by adjuvant chemotherapy + radiation therapy   Reviewed PET-CT without evidence of distant metastatic disease.  Will plan for treatment with neoadjuvant carbo Taxol pembrolizumab followed by AC pembrolizumab prior to surgical resection   S/p MRI bilateral breasts   Patient is not interested in fertility preservation  She is status post tubal ligation, discuss the need for dual contraception while on chemotherapy.  Baseline Echo 60%  S/p port placement and chemo ed  Status post neoadjuvant chemotherapy and immunotherapy  Genetic counseling done, negative.  S/P  Bilateral Mastectomy, with complete pathological response  Status post adjuvant radiation therapy on 11/29/2023  Initiated adjuvant pembrolizumab on 12/18/2023    Plan  1/29/24  Reviewed labs, proceed with cycle 3/9 adjuvant pembrolizumab today.   Plan to follow-up in 3 weeks, labs the day prior to treatment to assess treatment tolerance.    Brunilda [id number]

## 2024-01-29 ENCOUNTER — OFFICE VISIT (OUTPATIENT)
Dept: HEMATOLOGY/ONCOLOGY | Facility: CLINIC | Age: 39
End: 2024-01-29
Payer: MEDICAID

## 2024-01-29 VITALS
OXYGEN SATURATION: 100 % | SYSTOLIC BLOOD PRESSURE: 107 MMHG | DIASTOLIC BLOOD PRESSURE: 63 MMHG | BODY MASS INDEX: 31.96 KG/M2 | TEMPERATURE: 98 F | HEIGHT: 66 IN | HEART RATE: 72 BPM | RESPIRATION RATE: 20 BRPM | WEIGHT: 198.88 LBS

## 2024-01-29 DIAGNOSIS — Z17.1 MALIGNANT NEOPLASM OF UPPER-OUTER QUADRANT OF RIGHT BREAST IN FEMALE, ESTROGEN RECEPTOR NEGATIVE: Primary | ICD-10-CM

## 2024-01-29 DIAGNOSIS — Z51.11 ENCOUNTER FOR ANTINEOPLASTIC CHEMOTHERAPY: ICD-10-CM

## 2024-01-29 DIAGNOSIS — C50.411 MALIGNANT NEOPLASM OF UPPER-OUTER QUADRANT OF RIGHT BREAST IN FEMALE, ESTROGEN RECEPTOR NEGATIVE: Primary | ICD-10-CM

## 2024-01-29 PROCEDURE — 3008F BODY MASS INDEX DOCD: CPT | Mod: CPTII,,,

## 2024-01-29 PROCEDURE — 3078F DIAST BP <80 MM HG: CPT | Mod: CPTII,,,

## 2024-01-29 PROCEDURE — 1159F MED LIST DOCD IN RCRD: CPT | Mod: CPTII,,,

## 2024-01-29 PROCEDURE — 99215 OFFICE O/P EST HI 40 MIN: CPT | Mod: ,,,

## 2024-01-29 PROCEDURE — 3074F SYST BP LT 130 MM HG: CPT | Mod: CPTII,,,

## 2024-01-29 PROCEDURE — 1160F RVW MEDS BY RX/DR IN RCRD: CPT | Mod: CPTII,,,

## 2024-01-29 RX ORDER — SODIUM CHLORIDE 0.9 % (FLUSH) 0.9 %
10 SYRINGE (ML) INJECTION
Status: CANCELLED | OUTPATIENT
Start: 2024-01-29

## 2024-01-29 RX ORDER — EPINEPHRINE 0.3 MG/.3ML
0.3 INJECTION SUBCUTANEOUS ONCE AS NEEDED
Status: CANCELLED | OUTPATIENT
Start: 2024-01-29

## 2024-01-29 RX ORDER — HEPARIN 100 UNIT/ML
500 SYRINGE INTRAVENOUS
Status: CANCELLED | OUTPATIENT
Start: 2024-01-29

## 2024-01-29 RX ORDER — DIPHENHYDRAMINE HYDROCHLORIDE 50 MG/ML
50 INJECTION INTRAMUSCULAR; INTRAVENOUS ONCE AS NEEDED
Status: CANCELLED | OUTPATIENT
Start: 2024-01-29

## 2024-02-19 ENCOUNTER — OFFICE VISIT (OUTPATIENT)
Dept: HEMATOLOGY/ONCOLOGY | Facility: CLINIC | Age: 39
End: 2024-02-19
Payer: MEDICAID

## 2024-02-19 VITALS
SYSTOLIC BLOOD PRESSURE: 111 MMHG | HEART RATE: 72 BPM | DIASTOLIC BLOOD PRESSURE: 78 MMHG | OXYGEN SATURATION: 98 % | WEIGHT: 201.5 LBS | HEIGHT: 66 IN | TEMPERATURE: 99 F | BODY MASS INDEX: 32.38 KG/M2 | RESPIRATION RATE: 18 BRPM

## 2024-02-19 DIAGNOSIS — C50.411 MALIGNANT NEOPLASM OF UPPER-OUTER QUADRANT OF RIGHT BREAST IN FEMALE, ESTROGEN RECEPTOR NEGATIVE: Primary | ICD-10-CM

## 2024-02-19 DIAGNOSIS — Z17.1 MALIGNANT NEOPLASM OF UPPER-OUTER QUADRANT OF RIGHT BREAST IN FEMALE, ESTROGEN RECEPTOR NEGATIVE: Primary | ICD-10-CM

## 2024-02-19 DIAGNOSIS — Z51.11 ENCOUNTER FOR ANTINEOPLASTIC CHEMOTHERAPY: ICD-10-CM

## 2024-02-19 PROCEDURE — 3074F SYST BP LT 130 MM HG: CPT | Mod: CPTII,,, | Performed by: STUDENT IN AN ORGANIZED HEALTH CARE EDUCATION/TRAINING PROGRAM

## 2024-02-19 PROCEDURE — 1159F MED LIST DOCD IN RCRD: CPT | Mod: CPTII,,, | Performed by: STUDENT IN AN ORGANIZED HEALTH CARE EDUCATION/TRAINING PROGRAM

## 2024-02-19 PROCEDURE — 99215 OFFICE O/P EST HI 40 MIN: CPT | Mod: ,,, | Performed by: STUDENT IN AN ORGANIZED HEALTH CARE EDUCATION/TRAINING PROGRAM

## 2024-02-19 PROCEDURE — 3008F BODY MASS INDEX DOCD: CPT | Mod: CPTII,,, | Performed by: STUDENT IN AN ORGANIZED HEALTH CARE EDUCATION/TRAINING PROGRAM

## 2024-02-19 PROCEDURE — 3078F DIAST BP <80 MM HG: CPT | Mod: CPTII,,, | Performed by: STUDENT IN AN ORGANIZED HEALTH CARE EDUCATION/TRAINING PROGRAM

## 2024-02-19 RX ORDER — SODIUM CHLORIDE 0.9 % (FLUSH) 0.9 %
10 SYRINGE (ML) INJECTION
Status: CANCELLED | OUTPATIENT
Start: 2024-02-19

## 2024-02-19 RX ORDER — DIPHENHYDRAMINE HYDROCHLORIDE 50 MG/ML
50 INJECTION INTRAMUSCULAR; INTRAVENOUS ONCE AS NEEDED
Status: CANCELLED | OUTPATIENT
Start: 2024-02-19

## 2024-02-19 RX ORDER — HEPARIN 100 UNIT/ML
500 SYRINGE INTRAVENOUS
Status: CANCELLED | OUTPATIENT
Start: 2024-02-19

## 2024-02-19 RX ORDER — EPINEPHRINE 0.3 MG/.3ML
0.3 INJECTION SUBCUTANEOUS ONCE AS NEEDED
Status: CANCELLED | OUTPATIENT
Start: 2024-02-19

## 2024-02-19 NOTE — PROGRESS NOTES
Referring provider:  Dr. Burgess  Reason for consultation:  Triple negative right breast cancer     Diagnosis:     Right breast invasive ductal carcinoma, triple negative  ER negative, AK negative, HER2 IHC 0, Ki-67 85%   [id number], stage IIIC     Treatment history:    02/02/2023-7/26/23:  Weekly Carbo Taxol plus q. 3 weeks pembrolizumab followed by q. 3 weeks AC plus pembro   09/12/2023 - Bilateral mastectomy  10/24/23-11/29/23: Adjuvant radiation therapy    Current Treatment:    12/18/2023- current: Pembrolizumab      HPI  Patient is a 37-year-old with no significant medical history who noticed a mass right breast in November 2022 which led to further workup including mammogram, ultrasound and biopsy, pathology from biopsy in January 2023 revealed triple negative breast cancer, grade 3.  Patient had further workup including a PET-CT with Dr. Burgess not reveal any distant metastatic disease.  She presented to our clinic on 01/23/2023 to establish care for further disease management.      Patient denies any history of smoking, alcohol use or recreational drug use.  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  She has an ECOG of 0.  She has a mother and sister accompanied to her initial visit.  Family history of lung cancer in her grandfather and liver cancer in 1 of her uncles.  No history of breast cancer in the family.    Interval History:     Today, 02/19/2024, patient denies any acute concerns today she reports tolerating her last cycle of treatment fairly well without any significant side effects.  She denies any new medications, ER or hospital visits.  She does report intermittent pulling sensation/pain around bilateral breast.  She denies any obvious exacerbating or relieving factors.  She denies any new lumps or bumps or new masses.    Pathology  01/03/2023 right breast core biopsy and right axillary lymph node core biopsy:  Invasive ductal carcinoma, grade 3,  right axillary lymph node biopsy with fragments of poorly differentiated malignant tumor consistent with ductal carcinoma.  Normal lymph node identified.  ER negative, WI negative, HER2 IHC 0, Ki-67 85.3%.    23 Right Breast radical mastectomy- Right breast found to have no residual invasive or in situ carcinoma. Benign breast Parenchyma with changes consistent with previous biopsy, fibrosis and ductal hyperplasia without atypia. All margins negative for invasive and in situ carcinoma. 3 lymph nodes excised and found to be negative for metastatic carcinoma.     Imagin2023 CT abdomen pelvis with contrast:  No evidence of neoplastic disease, small umbilical hernia.    2022 echo EF 60%     01/10/2023 PET-CT:  Hypermetabolic mass in the superior right breast in keeping with the known cancer popliteal additional areas of mild-to-moderate uptake in the superior and retroareolar right breast are understood with definite discrete mass on CT but worrisome for infiltrative breast cancer.  Multiple hypermetabolic right axillary subpectoral and right internal mammary lymph nodes consistent with metastatic disease.  No other suspicious uptake.  Right breast mass measures 3 by 2.8 cm with an SUV of 13.7.  Inferior to the mass there was mild diffuse uptake associated with non-mass like dense breast tissue with an SUV of 4.0.  There is also abnormal uptake in the retroareolar right breast with definitive discrete mass can not be  from the dense glandular tissue with an SUV of 5.7 and measuring 1.5 x 1.1 cm.  These are concerning for infiltrative malignancies.    Laboratory     02/15/24 creatinine 0.74, albumin 2.6, LFTs unremarkable, WBC count 7.7, hemoglobin 14.3, .6, platelet count 380, ANC 6.5.    24: CMP and CBC WNL TSH 1.6687, Cortisol 9.6  2024: CMP WNL, TSH 0.8660, cortisol 7.7, wbc 5.20, hgb 11.7, plt 197, ANC 2.89  23: CMP unremarkable, TSH 0.514, cortisol 11.9, WBC  5.89, Hb 12.1, , ANC 3.59    12/04/2023 CMP unremarkable, WBC count 3.46, hemoglobin 11.6, platelet count 2 1 1.  TSH 1.9.  11/13/23: cr 0.78, alb 3.7, ca 9.3, LFTs WNL, TSH 1.5, wbc 8, hgb 11.1, plt 227, ANC 6.08  10/09/23: cr 0.82, alb 3.2, ca 8.9, LFTs WNL, TSH 1.5, wbc 6.93, hgb 9.8, plt 258, ANC 3.10  07/25/23: CMP WNL, wbc 3.83, hgb 9.4, plt 297, ANC 1.55, mag 1.8, phosphorus 4.2  07/04/2023: CMP unremarkable, TSH 0.50, cortisol 8.6, wbc 5.83, hgb 9.7, plt 307, ANC 3.57  06/12/2023:  Creatinine 0.87, albumin 3.7, LFTs within normal limits, calcium 9.5, magnesium 1.8, phosphorus 4.2, TSH 0.33, WBC count 3.9, hemoglobin 9.6, MCV 95, platelet count 327, ANC 1.87.   05/22/23 cr 0.81, alb 3.8, ca 9.6, LFTs WNL, mag 1.8, phosphorus 3.8, wbc 6.99, hgb 9.6, plt 297, ANC 3.95  05/01/23: Cr 0.85, alb 3.8, ca 9.3, AST 45, ALT 70, mag 1.7, phosphorus 3.6, wbc 4.85, hgb 10.2, plt 197, ANC 2.20  04/24/2023:  Creatinine 0.8, albumin 3.6, alkaline phosphatase 77, total bili 0.4, AST 39, ALT 87, magnesium 1.7, phosphorus 3.3, WBC count 9.6, ANC 4.9, platelet count 196, hemoglobin 9.6.  3/23/23: cr 0.79 , Mg 1.7 TSH 1.8  AST 32  wbc 7.92 hgb 10.0 Plt 215  03/15/23: cr 0.81, TB 0.7, mag 2.0, TSH 0.7533, cortisol 17.9, , , wbc 4.62, hgb 11.1, plt 238, ANC 1.89, UPT neg  03/08/2023:  Creatinine 0.83, AST 73, , bilirubin 0.5, TSH 0.75, WBC 5.7, hemoglobin 11, platelets 222  03/02/2023:  WBC count 5.17, hemoglobin 10.2, MCV 91.1, platelet count 265, ANC 1.89, urine pregnancy negative, creatinine 0.9, albu3.5, ca 9.2, alkaline phosphatase 76, total bilirubin 0.3, AST 53, , magnesium 1.8, phosphorus 2.7, TSH 0.9.  02/22/2023: WBC 3.02, Hgb 10.9, MCV 88.0, , ANC 1.4 3, UPT negative, creatinine 0.82 albumin 3.7 magnesium 2.1 phosphorus 2.1, TSH 0.6540, cortisol 8.7  02/15/23 Cr 0.83, alb 3.7, LFTs WNL, mag 1.7, phosphorus 2.0, TSH 0.4434, cortisol 10.6, wbc 3.32, hgb 11.3, plt 266, ANC  1.42 UPT negative   02/08/23: cr 0.79, alb 3.5, ca 9.3, AST 36, ALT 45, mag 1.9, phosphorus 2.1, wbc 4.47, hgb 11.1, plt 264, ANC 2.28  02/01/2023:  Creatinine 0.83, albumin 3.7, LFTs within normal limits, TSH 1.09, cortisol 8.8, free T4 0.98, WBC 7.7, Hb 11.5, MCV 87.9, platelet count 283, ANC 4.09, urine pregnancy negative.    Past Medical History:   Diagnosis Date    Breast cancer     GERD (gastroesophageal reflux disease)      Past Surgical History:   Procedure Laterality Date    ABLATION      MEDIPORT INSERTION, DOUBLE Left 01/26/2023    TUBAL LIGATION         Family History   Problem Relation Age of Onset    Diabetes Maternal Grandmother     Lung cancer Maternal Grandfather     Liver cancer Maternal Uncle        Social History     Socioeconomic History    Marital status: Single   Tobacco Use    Smoking status: Never    Smokeless tobacco: Never   Substance and Sexual Activity    Alcohol use: Yes     Comment: occasionally    Drug use: Never    Sexual activity: Yes     Partners: Male       Current Outpatient Medications   Medication Sig Dispense Refill    boric acid (PH-D) 600 mg vaginal suppository INSERT 1 SUPPOSITORY VAGINALLY DAILY AS NEEDED      hydrOXYzine pamoate (VISTARIL) 25 MG Cap Take 1 capsule (25 mg total) by mouth every 8 (eight) hours as needed (itching). (Patient taking differently: Take 25 mg by mouth as needed.) 30 capsule 1    LINZESS 290 mcg Cap capsule Take 290 mcg by mouth.      OLANZapine (ZYPREXA) 10 MG tablet Take by mouth.      promethazine (PHENERGAN) 12.5 MG Tab Take 1 tablet (12.5 mg total) by mouth every 4 (four) hours as needed. 60 tablet 1    promethazine-dextromethorphan (PROMETHAZINE-DM) 6.25-15 mg/5 mL Syrp Take by mouth.      traMADoL (ULTRAM) 50 mg tablet Take 50 mg by mouth every 4 (four) hours.      dexAMETHasone (DECADRON) 4 MG Tab Take 5 tablets by mouth at 12 hours and 6 hours prior to chemotherapy. (Patient not taking: Reported on 10/9/2023) 10 tablet 5    lactulose  "(CHRONULAC) 10 gram/15 mL solution SMARTSIG:Milliliter(s) By Mouth      OLANZapine (ZYPREXA) 5 MG tablet Take 1 tablet (5 mg total) by mouth As instructed. Take on days 1 to 4 of chemotherapy cycle at night. (Patient not taking: Reported on 7/5/2023) 8 tablet 0    ondansetron (ZOFRAN) 8 MG tablet Take 1 tablet (8 mg total) by mouth every 8 (eight) hours as needed for Nausea. (Patient not taking: Reported on 10/9/2023) 30 tablet 1    predniSONE (DELTASONE) 20 MG tablet Take 3 (20 mg) tablets by mouth daily for 5 days (Patient not taking: Reported on 10/9/2023) 15 tablet 0     No current facility-administered medications for this visit.     Review of patient's allergies indicates:  No Known Allergies         Physical Exam:   Blood pressure 111/78, pulse 72, temperature 98.7 °F (37.1 °C), temperature source Oral, resp. rate 18, height 5' 6" (1.676 m), weight 91.4 kg (201 lb 8 oz), SpO2 98 %.     ECOG PS 0  GA: AAOx3, NAD  HEENT: NCAT, no tenderness to head with palpation. good dentition, moist oral mucous membranes.  LYMPH: no cervical, axillary or supraclavicular adenopathy  CVS: s1s2 RRR, no M/R/G  RESP: CTA b/l, no crackles, no wheezes or rhonchi  BREAST:  Right breast with well-healed incision without nodularity, new lumps or bumps or skin changes.  Right axilla without evidence of adenopathy.  ABD: soft, NT, ND, BS+, no hepatosplenomegaly  EXT: no deformities, no pedal edema  SKIN: no rashes, warm and dry  NEURO: normal mentation, strength 5/5 on all 4 extremities, no sensory deficits      Assessment:       # Right breast invasive ductal carcinoma, triple negative  ER negative, AR negative, HER2 IHC 0, Ki-67 85%   [id number], stage IIIC   Discussed with patient the diagnosis of triple negative breast cancer, treatment recommendations including neoadjuvant chemotherapy followed by surgery followed by adjuvant chemotherapy + radiation therapy   Reviewed PET-CT without evidence of distant metastatic disease.  Will plan " for treatment with neoadjuvant carbo Taxol pembrolizumab followed by AC pembrolizumab prior to surgical resection   S/p MRI bilateral breasts   Patient is not interested in fertility preservation  She is status post tubal ligation, discuss the need for dual contraception while on chemotherapy.  Baseline Echo 60%  S/p port placement and chemo ed  Status post neoadjuvant chemotherapy and immunotherapy  Genetic counseling done, negative.  S/P Bilateral Mastectomy, with complete pathological response  Status post adjuvant radiation therapy on 11/29/2023  Initiated adjuvant pembrolizumab on 12/18/2023    Plan  Reviewed labs, proceed with cycle 4/9 adjuvant pembrolizumab today.   Plan to follow-up in 3 weeks, labs the day prior to treatment to assess treatment tolerance.      Portions of the record may have been created with voice recognition software. Occasional wrong-word or sound-a-like substitutions may have occurred due to the inherent limitations of voice recognition software.

## 2024-03-11 ENCOUNTER — OFFICE VISIT (OUTPATIENT)
Dept: HEMATOLOGY/ONCOLOGY | Facility: CLINIC | Age: 39
End: 2024-03-11
Payer: MEDICAID

## 2024-03-11 VITALS
SYSTOLIC BLOOD PRESSURE: 112 MMHG | TEMPERATURE: 98 F | DIASTOLIC BLOOD PRESSURE: 77 MMHG | HEART RATE: 65 BPM | WEIGHT: 207.13 LBS | OXYGEN SATURATION: 100 % | RESPIRATION RATE: 18 BRPM | BODY MASS INDEX: 33.29 KG/M2 | HEIGHT: 66 IN

## 2024-03-11 DIAGNOSIS — C50.411 MALIGNANT NEOPLASM OF UPPER-OUTER QUADRANT OF RIGHT BREAST IN FEMALE, ESTROGEN RECEPTOR NEGATIVE: Primary | ICD-10-CM

## 2024-03-11 DIAGNOSIS — Z17.1 MALIGNANT NEOPLASM OF UPPER-OUTER QUADRANT OF RIGHT BREAST IN FEMALE, ESTROGEN RECEPTOR NEGATIVE: Primary | ICD-10-CM

## 2024-03-11 PROCEDURE — 99215 OFFICE O/P EST HI 40 MIN: CPT | Mod: ,,,

## 2024-03-11 PROCEDURE — 3074F SYST BP LT 130 MM HG: CPT | Mod: CPTII,,,

## 2024-03-11 PROCEDURE — 3078F DIAST BP <80 MM HG: CPT | Mod: CPTII,,,

## 2024-03-11 PROCEDURE — 3008F BODY MASS INDEX DOCD: CPT | Mod: CPTII,,,

## 2024-03-11 PROCEDURE — 1159F MED LIST DOCD IN RCRD: CPT | Mod: CPTII,,,

## 2024-03-11 RX ORDER — SODIUM CHLORIDE 0.9 % (FLUSH) 0.9 %
10 SYRINGE (ML) INJECTION
Status: CANCELLED | OUTPATIENT
Start: 2024-03-11

## 2024-03-11 RX ORDER — HEPARIN 100 UNIT/ML
500 SYRINGE INTRAVENOUS
Status: CANCELLED | OUTPATIENT
Start: 2024-03-11

## 2024-03-11 RX ORDER — DIPHENHYDRAMINE HYDROCHLORIDE 50 MG/ML
50 INJECTION INTRAMUSCULAR; INTRAVENOUS ONCE AS NEEDED
Status: CANCELLED | OUTPATIENT
Start: 2024-03-11

## 2024-03-11 RX ORDER — EPINEPHRINE 0.3 MG/.3ML
0.3 INJECTION SUBCUTANEOUS ONCE AS NEEDED
Status: CANCELLED | OUTPATIENT
Start: 2024-03-11

## 2024-03-11 NOTE — PROGRESS NOTES
Referring provider:  Dr. Burgess  Reason for consultation:  Triple negative right breast cancer     Diagnosis:     Right breast invasive ductal carcinoma, triple negative  ER negative, TX negative, HER2 IHC 0, Ki-67 85%   [id number], stage IIIC     Treatment history:    02/02/2023-7/26/23:  Weekly Carbo Taxol plus q. 3 weeks pembrolizumab followed by q. 3 weeks AC plus pembro   09/12/2023 - Bilateral mastectomy  10/24/23-11/29/23: Adjuvant radiation therapy    Current Treatment:    12/18/2023- current: Pembrolizumab      HPI  Patient is a 37-year-old with no significant medical history who noticed a mass right breast in November 2022 which led to further workup including mammogram, ultrasound and biopsy, pathology from biopsy in January 2023 revealed triple negative breast cancer, grade 3.  Patient had further workup including a PET-CT with Dr. Burgess not reveal any distant metastatic disease.  She presented to our clinic on 01/23/2023 to establish care for further disease management.      Patient denies any history of smoking, alcohol use or recreational drug use.  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  She has an ECOG of 0.  She has a mother and sister accompanied to her initial visit.  Family history of lung cancer in her grandfather and liver cancer in 1 of her uncles.  No history of breast cancer in the family.    Interval History:     Today, 03/11/2024, patient denies any acute concerns today she reports tolerating her last cycle of treatment fairly well without any significant side effects.  She denies any new medications, ER or hospital visits. She denies any new lumps or bumps or new masses.    Pathology  01/03/2023 right breast core biopsy and right axillary lymph node core biopsy:  Invasive ductal carcinoma, grade 3, right axillary lymph node biopsy with fragments of poorly differentiated malignant tumor consistent with ductal carcinoma.  Normal lymph  node identified.  ER negative, RI negative, HER2 IHC 0, Ki-67 85.3%.    23 Right Breast radical mastectomy- Right breast found to have no residual invasive or in situ carcinoma. Benign breast Parenchyma with changes consistent with previous biopsy, fibrosis and ductal hyperplasia without atypia. All margins negative for invasive and in situ carcinoma. 3 lymph nodes excised and found to be negative for metastatic carcinoma.     Imagin2023 CT abdomen pelvis with contrast:  No evidence of neoplastic disease, small umbilical hernia.    2022 echo EF 60%     01/10/2023 PET-CT:  Hypermetabolic mass in the superior right breast in keeping with the known cancer popliteal additional areas of mild-to-moderate uptake in the superior and retroareolar right breast are understood with definite discrete mass on CT but worrisome for infiltrative breast cancer.  Multiple hypermetabolic right axillary subpectoral and right internal mammary lymph nodes consistent with metastatic disease.  No other suspicious uptake.  Right breast mass measures 3 by 2.8 cm with an SUV of 13.7.  Inferior to the mass there was mild diffuse uptake associated with non-mass like dense breast tissue with an SUV of 4.0.  There is also abnormal uptake in the retroareolar right breast with definitive discrete mass can not be  from the dense glandular tissue with an SUV of 5.7 and measuring 1.5 x 1.1 cm.  These are concerning for infiltrative malignancies.    Laboratory     03/10/24: CMP WNL, TSH 0.58, cortisol 13.3, wbc 7.25, hgb 11.0, plt 208, ANC 5.06  02/15/24 creatinine 0.74, albumin 2.6, LFTs unremarkable, WBC count 7.7, hemoglobin 14.3, .6, platelet count 380, ANC 6.5.    24: CMP and CBC WNL TSH 1.6687, Cortisol 9.6  2024: CMP WNL, TSH 0.8660, cortisol 7.7, wbc 5.20, hgb 11.7, plt 197, ANC 2.89  23: CMP unremarkable, TSH 0.514, cortisol 11.9, WBC 5.89, Hb 12.1, , ANC 3.59    2023 CMP  unremarkable, WBC count 3.46, hemoglobin 11.6, platelet count 2 1 1.  TSH 1.9.  11/13/23: cr 0.78, alb 3.7, ca 9.3, LFTs WNL, TSH 1.5, wbc 8, hgb 11.1, plt 227, ANC 6.08  10/09/23: cr 0.82, alb 3.2, ca 8.9, LFTs WNL, TSH 1.5, wbc 6.93, hgb 9.8, plt 258, ANC 3.10  07/25/23: CMP WNL, wbc 3.83, hgb 9.4, plt 297, ANC 1.55, mag 1.8, phosphorus 4.2  07/04/2023: CMP unremarkable, TSH 0.50, cortisol 8.6, wbc 5.83, hgb 9.7, plt 307, ANC 3.57  06/12/2023:  Creatinine 0.87, albumin 3.7, LFTs within normal limits, calcium 9.5, magnesium 1.8, phosphorus 4.2, TSH 0.33, WBC count 3.9, hemoglobin 9.6, MCV 95, platelet count 327, ANC 1.87.   05/22/23 cr 0.81, alb 3.8, ca 9.6, LFTs WNL, mag 1.8, phosphorus 3.8, wbc 6.99, hgb 9.6, plt 297, ANC 3.95  05/01/23: Cr 0.85, alb 3.8, ca 9.3, AST 45, ALT 70, mag 1.7, phosphorus 3.6, wbc 4.85, hgb 10.2, plt 197, ANC 2.20  04/24/2023:  Creatinine 0.8, albumin 3.6, alkaline phosphatase 77, total bili 0.4, AST 39, ALT 87, magnesium 1.7, phosphorus 3.3, WBC count 9.6, ANC 4.9, platelet count 196, hemoglobin 9.6.  3/23/23: cr 0.79 , Mg 1.7 TSH 1.8  AST 32  wbc 7.92 hgb 10.0 Plt 215  03/15/23: cr 0.81, TB 0.7, mag 2.0, TSH 0.7533, cortisol 17.9, , , wbc 4.62, hgb 11.1, plt 238, ANC 1.89, UPT neg  03/08/2023:  Creatinine 0.83, AST 73, , bilirubin 0.5, TSH 0.75, WBC 5.7, hemoglobin 11, platelets 222  03/02/2023:  WBC count 5.17, hemoglobin 10.2, MCV 91.1, platelet count 265, ANC 1.89, urine pregnancy negative, creatinine 0.9, albu3.5, ca 9.2, alkaline phosphatase 76, total bilirubin 0.3, AST 53, , magnesium 1.8, phosphorus 2.7, TSH 0.9.  02/22/2023: WBC 3.02, Hgb 10.9, MCV 88.0, , ANC 1.4 3, UPT negative, creatinine 0.82 albumin 3.7 magnesium 2.1 phosphorus 2.1, TSH 0.6540, cortisol 8.7  02/15/23 Cr 0.83, alb 3.7, LFTs WNL, mag 1.7, phosphorus 2.0, TSH 0.4434, cortisol 10.6, wbc 3.32, hgb 11.3, plt 266, ANC 1.42 UPT negative   02/08/23: cr 0.79, alb 3.5, ca  9.3, AST 36, ALT 45, mag 1.9, phosphorus 2.1, wbc 4.47, hgb 11.1, plt 264, ANC 2.28  02/01/2023:  Creatinine 0.83, albumin 3.7, LFTs within normal limits, TSH 1.09, cortisol 8.8, free T4 0.98, WBC 7.7, Hb 11.5, MCV 87.9, platelet count 283, ANC 4.09, urine pregnancy negative.    Past Medical History:   Diagnosis Date    Breast cancer     GERD (gastroesophageal reflux disease)      Past Surgical History:   Procedure Laterality Date    ABLATION      MEDIPORT INSERTION, DOUBLE Left 01/26/2023    TUBAL LIGATION         Family History   Problem Relation Age of Onset    Diabetes Maternal Grandmother     Lung cancer Maternal Grandfather     Liver cancer Maternal Uncle        Social History     Socioeconomic History    Marital status: Single   Tobacco Use    Smoking status: Never    Smokeless tobacco: Never   Substance and Sexual Activity    Alcohol use: Yes     Comment: occasionally    Drug use: Never    Sexual activity: Yes     Partners: Male       Current Outpatient Medications   Medication Sig Dispense Refill    boric acid (PH-D) 600 mg vaginal suppository INSERT 1 SUPPOSITORY VAGINALLY DAILY AS NEEDED      hydrOXYzine pamoate (VISTARIL) 25 MG Cap Take 1 capsule (25 mg total) by mouth every 8 (eight) hours as needed (itching). (Patient taking differently: Take 25 mg by mouth as needed.) 30 capsule 1    LINZESS 290 mcg Cap capsule Take 290 mcg by mouth.      OLANZapine (ZYPREXA) 10 MG tablet Take by mouth.      promethazine (PHENERGAN) 12.5 MG Tab Take 1 tablet (12.5 mg total) by mouth every 4 (four) hours as needed. 60 tablet 1    promethazine-dextromethorphan (PROMETHAZINE-DM) 6.25-15 mg/5 mL Syrp Take by mouth.      traMADoL (ULTRAM) 50 mg tablet Take 50 mg by mouth every 4 (four) hours.      dexAMETHasone (DECADRON) 4 MG Tab Take 5 tablets by mouth at 12 hours and 6 hours prior to chemotherapy. (Patient not taking: Reported on 10/9/2023) 10 tablet 5    lactulose (CHRONULAC) 10 gram/15 mL solution  "SMARTSIG:Milliliter(s) By Mouth      OLANZapine (ZYPREXA) 5 MG tablet Take 1 tablet (5 mg total) by mouth As instructed. Take on days 1 to 4 of chemotherapy cycle at night. (Patient not taking: Reported on 7/5/2023) 8 tablet 0    ondansetron (ZOFRAN) 8 MG tablet Take 1 tablet (8 mg total) by mouth every 8 (eight) hours as needed for Nausea. (Patient not taking: Reported on 10/9/2023) 30 tablet 1    predniSONE (DELTASONE) 20 MG tablet Take 3 (20 mg) tablets by mouth daily for 5 days (Patient not taking: Reported on 10/9/2023) 15 tablet 0     No current facility-administered medications for this visit.     Review of patient's allergies indicates:  No Known Allergies         Physical Exam:   Blood pressure 112/77, pulse 65, temperature 98 °F (36.7 °C), temperature source Oral, resp. rate 18, height 5' 6" (1.676 m), weight 93.9 kg (207 lb 1.6 oz), SpO2 100 %.     ECOG PS 0  GA: AAOx3, NAD  HEENT: NCAT, no tenderness to head with palpation. good dentition, moist oral mucous membranes.  LYMPH: no cervical, axillary or supraclavicular adenopathy  CVS: s1s2 RRR, no M/R/G  RESP: CTA b/l, no crackles, no wheezes or rhonchi  BREAST:  Right breast with well-healed incision without nodularity, new lumps or bumps or skin changes.  Right axilla without evidence of adenopathy.  ABD: soft, NT, ND, BS+, no hepatosplenomegaly  EXT: no deformities, no pedal edema  SKIN: no rashes, warm and dry  NEURO: normal mentation, strength 5/5 on all 4 extremities, no sensory deficits      Assessment:       # Right breast invasive ductal carcinoma, triple negative  ER negative, HI negative, HER2 IHC 0, Ki-67 85%   [id number], stage IIIC   Discussed with patient the diagnosis of triple negative breast cancer, treatment recommendations including neoadjuvant chemotherapy followed by surgery followed by adjuvant chemotherapy + radiation therapy   Reviewed PET-CT without evidence of distant metastatic disease.  Will plan for treatment with neoadjuvant " carbo Taxol pembrolizumab followed by AC pembrolizumab prior to surgical resection   S/p MRI bilateral breasts   Patient is not interested in fertility preservation  She is status post tubal ligation, discuss the need for dual contraception while on chemotherapy.  Baseline Echo 60%  S/p port placement and chemo ed  Status post neoadjuvant chemotherapy and immunotherapy  Genetic counseling done, negative.  S/P Bilateral Mastectomy, with complete pathological response  Status post adjuvant radiation therapy on 11/29/2023  Initiated adjuvant pembrolizumab on 12/18/2023    Plan  Reviewed labs, proceed with cycle 5/9 adjuvant pembrolizumab today.   Plan to follow-up in 3 weeks, labs the day prior to treatment to assess treatment tolerance.

## 2024-04-01 ENCOUNTER — OFFICE VISIT (OUTPATIENT)
Dept: HEMATOLOGY/ONCOLOGY | Facility: CLINIC | Age: 39
End: 2024-04-01
Payer: MEDICAID

## 2024-04-01 VITALS
DIASTOLIC BLOOD PRESSURE: 71 MMHG | SYSTOLIC BLOOD PRESSURE: 105 MMHG | WEIGHT: 205.19 LBS | OXYGEN SATURATION: 100 % | HEART RATE: 75 BPM | TEMPERATURE: 98 F | BODY MASS INDEX: 32.98 KG/M2 | HEIGHT: 66 IN | RESPIRATION RATE: 18 BRPM

## 2024-04-01 DIAGNOSIS — C50.411 MALIGNANT NEOPLASM OF UPPER-OUTER QUADRANT OF RIGHT BREAST IN FEMALE, ESTROGEN RECEPTOR NEGATIVE: ICD-10-CM

## 2024-04-01 DIAGNOSIS — Z17.1 MALIGNANT NEOPLASM OF UPPER-OUTER QUADRANT OF RIGHT BREAST IN FEMALE, ESTROGEN RECEPTOR NEGATIVE: ICD-10-CM

## 2024-04-01 DIAGNOSIS — Z29.89 ENCOUNTER FOR IMMUNOTHERAPY: Primary | ICD-10-CM

## 2024-04-01 PROCEDURE — 3078F DIAST BP <80 MM HG: CPT | Mod: CPTII,,, | Performed by: NURSE PRACTITIONER

## 2024-04-01 PROCEDURE — 1160F RVW MEDS BY RX/DR IN RCRD: CPT | Mod: CPTII,,, | Performed by: NURSE PRACTITIONER

## 2024-04-01 PROCEDURE — 1159F MED LIST DOCD IN RCRD: CPT | Mod: CPTII,,, | Performed by: NURSE PRACTITIONER

## 2024-04-01 PROCEDURE — 3008F BODY MASS INDEX DOCD: CPT | Mod: CPTII,,, | Performed by: NURSE PRACTITIONER

## 2024-04-01 PROCEDURE — 99215 OFFICE O/P EST HI 40 MIN: CPT | Mod: ,,, | Performed by: NURSE PRACTITIONER

## 2024-04-01 PROCEDURE — 3074F SYST BP LT 130 MM HG: CPT | Mod: CPTII,,, | Performed by: NURSE PRACTITIONER

## 2024-04-01 RX ORDER — SODIUM CHLORIDE 0.9 % (FLUSH) 0.9 %
10 SYRINGE (ML) INJECTION
Status: CANCELLED | OUTPATIENT
Start: 2024-04-01

## 2024-04-01 RX ORDER — HEPARIN 100 UNIT/ML
500 SYRINGE INTRAVENOUS
Status: CANCELLED | OUTPATIENT
Start: 2024-04-01

## 2024-04-01 RX ORDER — DIPHENHYDRAMINE HYDROCHLORIDE 50 MG/ML
50 INJECTION INTRAMUSCULAR; INTRAVENOUS ONCE AS NEEDED
Status: CANCELLED | OUTPATIENT
Start: 2024-04-01

## 2024-04-01 RX ORDER — EPINEPHRINE 0.3 MG/.3ML
0.3 INJECTION SUBCUTANEOUS ONCE AS NEEDED
Status: CANCELLED | OUTPATIENT
Start: 2024-04-01

## 2024-04-01 NOTE — PROGRESS NOTES
Referring provider:  Dr. Burgess  Reason for consultation:  Triple negative right breast cancer     Diagnosis:     Right breast invasive ductal carcinoma, triple negative  ER negative, MI negative, HER2 IHC 0, Ki-67 85%   [id number], stage IIIC     Treatment history:    02/02/2023-7/26/23:  Weekly Carbo Taxol plus q. 3 weeks pembrolizumab followed by q. 3 weeks AC plus pembro   09/12/2023 - Bilateral mastectomy  10/24/23-11/29/23: Adjuvant radiation therapy    Current Treatment:    12/18/2023- current: Pembrolizumab      HPI  Patient is a 37-year-old with no significant medical history who noticed a mass right breast in November 2022 which led to further workup including mammogram, ultrasound and biopsy, pathology from biopsy in January 2023 revealed triple negative breast cancer, grade 3.  Patient had further workup including a PET-CT with Dr. Burgess not reveal any distant metastatic disease.  She presented to our clinic on 01/23/2023 to establish care for further disease management.      Patient denies any history of smoking, alcohol use or recreational drug use.  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  She has an ECOG of 0.  She has a mother and sister accompanied to her initial visit.  Family history of lung cancer in her grandfather and liver cancer in 1 of her uncles.  No history of breast cancer in the family.    Interval History:     4/1/2024:  Ms. Cordoba is here today for follow-up, labs, and cycle 6/9, day 1 of Pembrolizumab for her stage IIIC triple negative right breast cancer.  Today, she reports feeling well with the exception of a nonproductive cough.  She went to the ER and had a CXR, which was negative.  Tested negative for flu, RSV, and Covid-19.  She is tolerating Pembrolizumab without any untoward side effects.  She reports no new lumps, masses, or skin changes of the chest wall and axillae.   She denies any new onset of headaches, cough, SOB,  chest pain, abdominal pain, or bone pain.   Labs reviewed with patient dated 3/30/2024:  Creatinine 0.89, liver enzymes WNL, T. Bili WNL, WBC 6.19, Hgb 11.6, Hct 34.8, Plt 225,000, TSH 0.99, cortisol 14.5.  Eating and drinking.  Weight is stable,  No recent hospitalizations, infections, or illnesses.     Pathology  2023 right breast core biopsy and right axillary lymph node core biopsy:  Invasive ductal carcinoma, grade 3, right axillary lymph node biopsy with fragments of poorly differentiated malignant tumor consistent with ductal carcinoma.  Normal lymph node identified.  ER negative, KS negative, HER2 IHC 0, Ki-67 85.3%.    23 Right Breast radical mastectomy- Right breast found to have no residual invasive or in situ carcinoma. Benign breast Parenchyma with changes consistent with previous biopsy, fibrosis and ductal hyperplasia without atypia. All margins negative for invasive and in situ carcinoma. 3 lymph nodes excised and found to be negative for metastatic carcinoma.     Imagin2023 CT abdomen pelvis with contrast:  No evidence of neoplastic disease, small umbilical hernia.    2022 echo EF 60%     01/10/2023 PET-CT:  Hypermetabolic mass in the superior right breast in keeping with the known cancer popliteal additional areas of mild-to-moderate uptake in the superior and retroareolar right breast are understood with definite discrete mass on CT but worrisome for infiltrative breast cancer.  Multiple hypermetabolic right axillary subpectoral and right internal mammary lymph nodes consistent with metastatic disease.  No other suspicious uptake.  Right breast mass measures 3 by 2.8 cm with an SUV of 13.7.  Inferior to the mass there was mild diffuse uptake associated with non-mass like dense breast tissue with an SUV of 4.0.  There is also abnormal uptake in the retroareolar right breast with definitive discrete mass can not be  from the dense glandular tissue with an SUV  of 5.7 and measuring 1.5 x 1.1 cm.  These are concerning for infiltrative malignancies.    Laboratory   3/30/2024:  Creatinine 0.89, liver enzymes WNL, T. Bili WNL, WBC 6.19, Hgb 11.6, Hct 34.8, Plt 225,000, TSH 0.99, cortisol 14.5.  03/10/24: CMP WNL, TSH 0.58, cortisol 13.3, wbc 7.25, hgb 11.0, plt 208, ANC 5.06  02/15/24 creatinine 0.74, albumin 2.6, LFTs unremarkable, WBC count 7.7, hemoglobin 14.3, .6, platelet count 380, ANC 6.5.    1/28/24: CMP and CBC WNL TSH 1.6687, Cortisol 9.6  01/07/2024: CMP WNL, TSH 0.8660, cortisol 7.7, wbc 5.20, hgb 11.7, plt 197, ANC 2.89  12/17/23: CMP unremarkable, TSH 0.514, cortisol 11.9, WBC 5.89, Hb 12.1, , ANC 3.59    12/04/2023 CMP unremarkable, WBC count 3.46, hemoglobin 11.6, platelet count 2 1 1.  TSH 1.9.  11/13/23: cr 0.78, alb 3.7, ca 9.3, LFTs WNL, TSH 1.5, wbc 8, hgb 11.1, plt 227, ANC 6.08  10/09/23: cr 0.82, alb 3.2, ca 8.9, LFTs WNL, TSH 1.5, wbc 6.93, hgb 9.8, plt 258, ANC 3.10  07/25/23: CMP WNL, wbc 3.83, hgb 9.4, plt 297, ANC 1.55, mag 1.8, phosphorus 4.2  07/04/2023: CMP unremarkable, TSH 0.50, cortisol 8.6, wbc 5.83, hgb 9.7, plt 307, ANC 3.57  06/12/2023:  Creatinine 0.87, albumin 3.7, LFTs within normal limits, calcium 9.5, magnesium 1.8, phosphorus 4.2, TSH 0.33, WBC count 3.9, hemoglobin 9.6, MCV 95, platelet count 327, ANC 1.87.   05/22/23 cr 0.81, alb 3.8, ca 9.6, LFTs WNL, mag 1.8, phosphorus 3.8, wbc 6.99, hgb 9.6, plt 297, ANC 3.95  05/01/23: Cr 0.85, alb 3.8, ca 9.3, AST 45, ALT 70, mag 1.7, phosphorus 3.6, wbc 4.85, hgb 10.2, plt 197, ANC 2.20  04/24/2023:  Creatinine 0.8, albumin 3.6, alkaline phosphatase 77, total bili 0.4, AST 39, ALT 87, magnesium 1.7, phosphorus 3.3, WBC count 9.6, ANC 4.9, platelet count 196, hemoglobin 9.6.  3/23/23: cr 0.79 , Mg 1.7 TSH 1.8  AST 32  wbc 7.92 hgb 10.0 Plt 215  03/15/23: cr 0.81, TB 0.7, mag 2.0, TSH 0.7533, cortisol 17.9, , , wbc 4.62, hgb 11.1, plt 238, ANC 1.89, UPT  neg  03/08/2023:  Creatinine 0.83, AST 73, , bilirubin 0.5, TSH 0.75, WBC 5.7, hemoglobin 11, platelets 222  03/02/2023:  WBC count 5.17, hemoglobin 10.2, MCV 91.1, platelet count 265, ANC 1.89, urine pregnancy negative, creatinine 0.9, albu3.5, ca 9.2, alkaline phosphatase 76, total bilirubin 0.3, AST 53, , magnesium 1.8, phosphorus 2.7, TSH 0.9.  02/22/2023: WBC 3.02, Hgb 10.9, MCV 88.0, , ANC 1.4 3, UPT negative, creatinine 0.82 albumin 3.7 magnesium 2.1 phosphorus 2.1, TSH 0.6540, cortisol 8.7  02/15/23 Cr 0.83, alb 3.7, LFTs WNL, mag 1.7, phosphorus 2.0, TSH 0.4434, cortisol 10.6, wbc 3.32, hgb 11.3, plt 266, ANC 1.42 UPT negative   02/08/23: cr 0.79, alb 3.5, ca 9.3, AST 36, ALT 45, mag 1.9, phosphorus 2.1, wbc 4.47, hgb 11.1, plt 264, ANC 2.28  02/01/2023:  Creatinine 0.83, albumin 3.7, LFTs within normal limits, TSH 1.09, cortisol 8.8, free T4 0.98, WBC 7.7, Hb 11.5, MCV 87.9, platelet count 283, ANC 4.09, urine pregnancy negative.    Past Medical History:   Diagnosis Date    Breast cancer     GERD (gastroesophageal reflux disease)      Past Surgical History:   Procedure Laterality Date    ABLATION      MEDIPORT INSERTION, DOUBLE Left 01/26/2023    TUBAL LIGATION         Family History   Problem Relation Age of Onset    Diabetes Maternal Grandmother     Lung cancer Maternal Grandfather     Liver cancer Maternal Uncle        Social History     Socioeconomic History    Marital status: Single   Tobacco Use    Smoking status: Never    Smokeless tobacco: Never   Substance and Sexual Activity    Alcohol use: Yes     Comment: occasionally    Drug use: Never    Sexual activity: Yes     Partners: Male       Current Outpatient Medications   Medication Sig Dispense Refill    boric acid (PH-D) 600 mg vaginal suppository INSERT 1 SUPPOSITORY VAGINALLY DAILY AS NEEDED      hydrOXYzine pamoate (VISTARIL) 25 MG Cap Take 1 capsule (25 mg total) by mouth every 8 (eight) hours as needed (itching). (Patient  "taking differently: Take 25 mg by mouth as needed.) 30 capsule 1    LINZESS 290 mcg Cap capsule Take 290 mcg by mouth.      OLANZapine (ZYPREXA) 10 MG tablet Take by mouth.      promethazine (PHENERGAN) 12.5 MG Tab Take 1 tablet (12.5 mg total) by mouth every 4 (four) hours as needed. 60 tablet 1    promethazine-dextromethorphan (PROMETHAZINE-DM) 6.25-15 mg/5 mL Syrp Take by mouth.      traMADoL (ULTRAM) 50 mg tablet Take 50 mg by mouth every 4 (four) hours.       No current facility-administered medications for this visit.     Review of patient's allergies indicates:  No Known Allergies         Physical Exam:   Blood pressure 105/71, pulse 75, temperature 97.8 °F (36.6 °C), temperature source Oral, resp. rate 18, height 5' 6" (1.676 m), weight 93.1 kg (205 lb 3.2 oz), SpO2 100 %.     ECOG PS 0  GA: AAOx3, NAD  HEENT: NCAT, no tenderness to head with palpation. good dentition, moist oral mucous membranes.  LYMPH: no cervical, axillary or supraclavicular adenopathy  CVS: s1s2 RRR, no M/R/G  RESP: CTA b/l, no crackles, no wheezes or rhonchi  BREAST:  Right breast with well-healed incision without nodularity, new lumps or bumps or skin changes.  Right axilla without evidence of adenopathy.  ABD: soft, NT, ND, BS+, no hepatosplenomegaly  EXT: no deformities, no pedal edema  SKIN: no rashes, warm and dry  NEURO: normal mentation, strength 5/5 on all 4 extremities, no sensory deficits      Assessment:       # Right breast invasive ductal carcinoma, triple negative  ER negative, [place] negative, HER2 IHC 0, Ki-67 85%   [id number], stage IIIC   Discussed with patient the diagnosis of triple negative breast cancer, treatment recommendations including neoadjuvant chemotherapy followed by surgery followed by adjuvant chemotherapy + radiation therapy   Reviewed PET-CT without evidence of distant metastatic disease.  Will plan for treatment with neoadjuvant carbo Taxol pembrolizumab followed by AC pembrolizumab prior to surgical " resection   S/p MRI bilateral breasts   Patient is not interested in fertility preservation  She is status post tubal ligation, discuss the need for dual contraception while on chemotherapy.  Baseline Echo 60%  S/p port placement and chemo ed  Status post neoadjuvant chemotherapy and immunotherapy  Genetic counseling done, negative.  S/P Bilateral Mastectomy, with complete pathological response  Status post adjuvant radiation therapy on 11/29/2023  Initiated adjuvant pembrolizumab on 12/18/2023    Plan  Reviewed labs, proceed with cycle 6/9 adjuvant pembrolizumab today.   Plan to follow-up in 3 weeks, labs the day prior to treatment to assess treatment tolerance.    The patient is in agreement with today's plan of care.  All questions answered.  Patient is aware to contact our office for any problems or concerns prior to next visit.      Kendy Blanchard, MSN-ED, APRN, AGACNP-BC, OCN

## 2024-04-16 NOTE — PROGRESS NOTES
Referring provider:  Dr. Burgess  Reason for consultation:  Triple negative right breast cancer     Diagnosis:     Right breast invasive ductal carcinoma, triple negative  ER negative, NM negative, HER2 IHC 0, Ki-67 85%   [id number], stage IIIC     Treatment history:    02/02/2023-7/26/23:  Weekly Carbo Taxol plus q. 3 weeks pembrolizumab followed by q. 3 weeks AC plus pembro   09/12/2023 - Bilateral mastectomy  10/24/23-11/29/23: Adjuvant radiation therapy    Current Treatment:    12/18/2023- current: Pembrolizumab      HPI  Patient is a 37-year-old with no significant medical history who noticed a mass right breast in November 2022 which led to further workup including mammogram, ultrasound and biopsy, pathology from biopsy in January 2023 revealed triple negative breast cancer, grade 3.  Patient had further workup including a PET-CT with Dr. Burgess not reveal any distant metastatic disease.  She presented to our clinic on 01/23/2023 to establish care for further disease management.      Patient denies any history of smoking, alcohol use or recreational drug use.  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  She has an ECOG of 0.  She has a mother and sister accompanied to her initial visit.  Family history of lung cancer in her grandfather and liver cancer in 1 of her uncles.  No history of breast cancer in the family.    Interval History:     4/22/2024:  Ms. Cordoba is here today for follow-up, labs, and cycle 7/9, day 1 of Pembrolizumab for her stage IIIC triple negative right breast cancer.   She is tolerating Pembrolizumab without any untoward side effects.  She reports no new lumps, masses, or skin changes of the chest wall and axillae.   She denies any new onset of headaches, cough, SOB, chest pain, abdominal pain, or bone pain.     Eating and drinking.  Weight is stable,  No recent hospitalizations, infections, or illnesses.     Pathology  01/03/2023 right breast  core biopsy and right axillary lymph node core biopsy:  Invasive ductal carcinoma, grade 3, right axillary lymph node biopsy with fragments of poorly differentiated malignant tumor consistent with ductal carcinoma.  Normal lymph node identified.  ER negative, [place] negative, HER2 IHC 0, Ki-67 85.3%.    23 Right Breast radical mastectomy- Right breast found to have no residual invasive or in situ carcinoma. Benign breast Parenchyma with changes consistent with previous biopsy, fibrosis and ductal hyperplasia without atypia. All margins negative for invasive and in situ carcinoma. 3 lymph nodes excised and found to be negative for metastatic carcinoma.     Imagin2023 CT abdomen pelvis with contrast:  No evidence of neoplastic disease, small umbilical hernia.    2022 echo EF 60%     01/10/2023 PET-CT:  Hypermetabolic mass in the superior right breast in keeping with the known cancer popliteal additional areas of mild-to-moderate uptake in the superior and retroareolar right breast are understood with definite discrete mass on CT but worrisome for infiltrative breast cancer.  Multiple hypermetabolic right axillary subpectoral and right internal mammary lymph nodes consistent with metastatic disease.  No other suspicious uptake.  Right breast mass measures 3 by 2.8 cm with an SUV of 13.7.  Inferior to the mass there was mild diffuse uptake associated with non-mass like dense breast tissue with an SUV of 4.0.  There is also abnormal uptake in the retroareolar right breast with definitive discrete mass can not be  from the dense glandular tissue with an SUV of 5.7 and measuring 1.5 x 1.1 cm.  These are concerning for infiltrative malignancies.    Laboratory   24 cr 0.88, TSH 0.6571, WBC 10.61, HGB 11.3, , ANC 8.19  3/30/2024:  Creatinine 0.89, liver enzymes WNL, T. Bili WNL, WBC 6.19, Hgb 11.6, Hct 34.8, Plt 225,000, TSH 0.99, cortisol 14.5.  03/10/24: CMP WNL, TSH 0.58, cortisol  13.3, wbc 7.25, hgb 11.0, plt 208, ANC 5.06  02/15/24 creatinine 0.74, albumin 2.6, LFTs unremarkable, WBC count 7.7, hemoglobin 14.3, .6, platelet count 380, ANC 6.5.    1/28/24: CMP and CBC WNL TSH 1.6687, Cortisol 9.6  01/07/2024: CMP WNL, TSH 0.8660, cortisol 7.7, wbc 5.20, hgb 11.7, plt 197, ANC 2.89  12/17/23: CMP unremarkable, TSH 0.514, cortisol 11.9, WBC 5.89, Hb 12.1, , ANC 3.59    12/04/2023 CMP unremarkable, WBC count 3.46, hemoglobin 11.6, platelet count 2 1 1.  TSH 1.9.  11/13/23: cr 0.78, alb 3.7, ca 9.3, LFTs WNL, TSH 1.5, wbc 8, hgb 11.1, plt 227, ANC 6.08  10/09/23: cr 0.82, alb 3.2, ca 8.9, LFTs WNL, TSH 1.5, wbc 6.93, hgb 9.8, plt 258, ANC 3.10  07/25/23: CMP WNL, wbc 3.83, hgb 9.4, plt 297, ANC 1.55, mag 1.8, phosphorus 4.2  07/04/2023: CMP unremarkable, TSH 0.50, cortisol 8.6, wbc 5.83, hgb 9.7, plt 307, ANC 3.57  06/12/2023:  Creatinine 0.87, albumin 3.7, LFTs within normal limits, calcium 9.5, magnesium 1.8, phosphorus 4.2, TSH 0.33, WBC count 3.9, hemoglobin 9.6, MCV 95, platelet count 327, ANC 1.87.   05/22/23 cr 0.81, alb 3.8, ca 9.6, LFTs WNL, mag 1.8, phosphorus 3.8, wbc 6.99, hgb 9.6, plt 297, ANC 3.95  05/01/23: Cr 0.85, alb 3.8, ca 9.3, AST 45, ALT 70, mag 1.7, phosphorus 3.6, wbc 4.85, hgb 10.2, plt 197, ANC 2.20  04/24/2023:  Creatinine 0.8, albumin 3.6, alkaline phosphatase 77, total bili 0.4, AST 39, ALT 87, magnesium 1.7, phosphorus 3.3, WBC count 9.6, ANC 4.9, platelet count 196, hemoglobin 9.6.  3/23/23: cr 0.79 , Mg 1.7 TSH 1.8  AST 32  wbc 7.92 hgb 10.0 Plt 215  03/15/23: cr 0.81, TB 0.7, mag 2.0, TSH 0.7533, cortisol 17.9, , , wbc 4.62, hgb 11.1, plt 238, ANC 1.89, UPT neg  03/08/2023:  Creatinine 0.83, AST 73, , bilirubin 0.5, TSH 0.75, WBC 5.7, hemoglobin 11, platelets 222  03/02/2023:  WBC count 5.17, hemoglobin 10.2, MCV 91.1, platelet count 265, ANC 1.89, urine pregnancy negative, creatinine 0.9, albu3.5, ca 9.2, alkaline  phosphatase 76, total bilirubin 0.3, AST 53, , magnesium 1.8, phosphorus 2.7, TSH 0.9.  02/22/2023: WBC 3.02, Hgb 10.9, MCV 88.0, , ANC 1.4 3, UPT negative, creatinine 0.82 albumin 3.7 magnesium 2.1 phosphorus 2.1, TSH 0.6540, cortisol 8.7  02/15/23 Cr 0.83, alb 3.7, LFTs WNL, mag 1.7, phosphorus 2.0, TSH 0.4434, cortisol 10.6, wbc 3.32, hgb 11.3, plt 266, ANC 1.42 UPT negative   02/08/23: cr 0.79, alb 3.5, ca 9.3, AST 36, ALT 45, mag 1.9, phosphorus 2.1, wbc 4.47, hgb 11.1, plt 264, ANC 2.28  02/01/2023:  Creatinine 0.83, albumin 3.7, LFTs within normal limits, TSH 1.09, cortisol 8.8, free T4 0.98, WBC 7.7, Hb 11.5, MCV 87.9, platelet count 283, ANC 4.09, urine pregnancy negative.    Past Medical History:   Diagnosis Date    Breast cancer     GERD (gastroesophageal reflux disease)      Past Surgical History:   Procedure Laterality Date    ABLATION      MEDIPORT INSERTION, DOUBLE Left 01/26/2023    TUBAL LIGATION         Family History   Problem Relation Name Age of Onset    Diabetes Maternal Grandmother [facility]     Lung cancer Maternal Grandfather [facility]     Liver cancer Maternal Uncle [facility]        Social History     Socioeconomic History    Marital status: Single   Tobacco Use    Smoking status: Never    Smokeless tobacco: Never   Substance and Sexual Activity    Alcohol use: Yes     Comment: occasionally    Drug use: Never    Sexual activity: Yes     Partners: Male       Current Outpatient Medications   Medication Sig Dispense Refill    boric acid (PH-D) 600 mg vaginal suppository INSERT 1 SUPPOSITORY VAGINALLY DAILY AS NEEDED      hydrOXYzine pamoate (VISTARIL) 25 MG Cap Take 1 capsule (25 mg total) by mouth every 8 (eight) hours as needed (itching). (Patient taking differently: Take 25 mg by mouth as needed.) 30 capsule 1    LINZESS 290 mcg Cap capsule Take 290 mcg by mouth.      OLANZapine (ZYPREXA) 10 MG tablet Take by mouth.      promethazine (PHENERGAN) 12.5 MG Tab Take 1 tablet  "(12.5 mg total) by mouth every 4 (four) hours as needed. 60 tablet 1    promethazine-dextromethorphan (PROMETHAZINE-DM) 6.25-15 mg/5 mL Syrp Take by mouth.      traMADoL (ULTRAM) 50 mg tablet Take 50 mg by mouth every 4 (four) hours.       No current facility-administered medications for this visit.     Review of patient's allergies indicates:  No Known Allergies         Physical Exam:   Blood pressure 109/74, pulse 88, temperature 98.1 °F (36.7 °C), temperature source Oral, resp. rate 20, height 5' 6" (1.676 m), weight 91.8 kg (202 lb 6.4 oz), SpO2 99%.     ECOG PS 0  GA: AAOx3, NAD  HEENT: NCAT, no tenderness to head with palpation. good dentition, moist oral mucous membranes.  LYMPH: no cervical, axillary or supraclavicular adenopathy  CVS: s1s2 RRR, no M/R/G  RESP: CTA b/l, no crackles, no wheezes or rhonchi  BREAST:  Right breast with well-healed incision without nodularity, new lumps or bumps or skin changes.  Right axilla without evidence of adenopathy.  ABD: soft, NT, ND, BS+, no hepatosplenomegaly  EXT: no deformities, no pedal edema  SKIN: no rashes, warm and dry  NEURO: normal mentation, strength 5/5 on all 4 extremities, no sensory deficits      Assessment:       # Right breast invasive ductal carcinoma, triple negative  ER negative, [place] negative, HER2 IHC 0, Ki-67 85%   [id number], stage IIIC   Discussed with patient the diagnosis of triple negative breast cancer, treatment recommendations including neoadjuvant chemotherapy followed by surgery followed by adjuvant chemotherapy + radiation therapy   Reviewed PET-CT without evidence of distant metastatic disease.  Will plan for treatment with neoadjuvant carbo Taxol pembrolizumab followed by AC pembrolizumab prior to surgical resection   S/p MRI bilateral breasts   Patient is not interested in fertility preservation  She is status post tubal ligation, discuss the need for dual contraception while on chemotherapy.  Baseline Echo 60%  S/p port placement and " chemo ed  Status post neoadjuvant chemotherapy and immunotherapy  Genetic counseling done, negative.  S/P Bilateral Mastectomy, with complete pathological response  Status post adjuvant radiation therapy on 11/29/2023  Initiated adjuvant pembrolizumab on 12/18/2023    Plan  Reviewed labs, proceed with cycle 7/9 adjuvant pembrolizumab today.   Plan to follow-up in 3 weeks, labs the day prior to treatment to assess treatment tolerance.    The patient is in agreement with today's plan of care.  All questions answered.  Patient is aware to contact our office for any problems or concerns prior to next visit.      Brunilda REIDP-C

## 2024-04-22 ENCOUNTER — OFFICE VISIT (OUTPATIENT)
Dept: HEMATOLOGY/ONCOLOGY | Facility: CLINIC | Age: 39
End: 2024-04-22
Payer: MEDICAID

## 2024-04-22 VITALS
TEMPERATURE: 98 F | RESPIRATION RATE: 20 BRPM | DIASTOLIC BLOOD PRESSURE: 74 MMHG | WEIGHT: 202.38 LBS | SYSTOLIC BLOOD PRESSURE: 109 MMHG | BODY MASS INDEX: 32.53 KG/M2 | HEIGHT: 66 IN | HEART RATE: 88 BPM | OXYGEN SATURATION: 99 %

## 2024-04-22 DIAGNOSIS — Z17.1 MALIGNANT NEOPLASM OF UPPER-OUTER QUADRANT OF RIGHT BREAST IN FEMALE, ESTROGEN RECEPTOR NEGATIVE: ICD-10-CM

## 2024-04-22 DIAGNOSIS — Z29.89 ENCOUNTER FOR IMMUNOTHERAPY: Primary | ICD-10-CM

## 2024-04-22 DIAGNOSIS — C50.411 MALIGNANT NEOPLASM OF UPPER-OUTER QUADRANT OF RIGHT BREAST IN FEMALE, ESTROGEN RECEPTOR NEGATIVE: ICD-10-CM

## 2024-04-22 PROCEDURE — 1159F MED LIST DOCD IN RCRD: CPT | Mod: CPTII,,,

## 2024-04-22 PROCEDURE — 3074F SYST BP LT 130 MM HG: CPT | Mod: CPTII,,,

## 2024-04-22 PROCEDURE — 3078F DIAST BP <80 MM HG: CPT | Mod: CPTII,,,

## 2024-04-22 PROCEDURE — 99215 OFFICE O/P EST HI 40 MIN: CPT | Mod: ,,,

## 2024-04-22 PROCEDURE — 3008F BODY MASS INDEX DOCD: CPT | Mod: CPTII,,,

## 2024-04-22 RX ORDER — EPINEPHRINE 0.3 MG/.3ML
0.3 INJECTION SUBCUTANEOUS ONCE AS NEEDED
Status: CANCELLED | OUTPATIENT
Start: 2024-04-22

## 2024-04-22 RX ORDER — SODIUM CHLORIDE 0.9 % (FLUSH) 0.9 %
10 SYRINGE (ML) INJECTION
Status: CANCELLED | OUTPATIENT
Start: 2024-04-22

## 2024-04-22 RX ORDER — DIPHENHYDRAMINE HYDROCHLORIDE 50 MG/ML
50 INJECTION INTRAMUSCULAR; INTRAVENOUS ONCE AS NEEDED
Status: CANCELLED | OUTPATIENT
Start: 2024-04-22

## 2024-04-22 RX ORDER — HEPARIN 100 UNIT/ML
500 SYRINGE INTRAVENOUS
Status: CANCELLED | OUTPATIENT
Start: 2024-04-22

## 2024-05-15 ENCOUNTER — OFFICE VISIT (OUTPATIENT)
Dept: HEMATOLOGY/ONCOLOGY | Facility: CLINIC | Age: 39
End: 2024-05-15
Payer: MEDICAID

## 2024-05-15 VITALS
HEIGHT: 66 IN | TEMPERATURE: 98 F | OXYGEN SATURATION: 100 % | DIASTOLIC BLOOD PRESSURE: 77 MMHG | HEART RATE: 92 BPM | RESPIRATION RATE: 18 BRPM | SYSTOLIC BLOOD PRESSURE: 119 MMHG | BODY MASS INDEX: 32.59 KG/M2 | WEIGHT: 202.81 LBS

## 2024-05-15 DIAGNOSIS — C50.411 MALIGNANT NEOPLASM OF UPPER-OUTER QUADRANT OF RIGHT BREAST IN FEMALE, ESTROGEN RECEPTOR NEGATIVE: Primary | ICD-10-CM

## 2024-05-15 DIAGNOSIS — Z17.1 MALIGNANT NEOPLASM OF UPPER-OUTER QUADRANT OF RIGHT BREAST IN FEMALE, ESTROGEN RECEPTOR NEGATIVE: Primary | ICD-10-CM

## 2024-05-15 PROCEDURE — 3078F DIAST BP <80 MM HG: CPT | Mod: CPTII,,,

## 2024-05-15 PROCEDURE — 1159F MED LIST DOCD IN RCRD: CPT | Mod: CPTII,,,

## 2024-05-15 PROCEDURE — 99215 OFFICE O/P EST HI 40 MIN: CPT | Mod: ,,,

## 2024-05-15 PROCEDURE — 3074F SYST BP LT 130 MM HG: CPT | Mod: CPTII,,,

## 2024-05-15 PROCEDURE — 3008F BODY MASS INDEX DOCD: CPT | Mod: CPTII,,,

## 2024-05-15 RX ORDER — PREDNISONE 20 MG/1
20 TABLET ORAL
COMMUNITY
Start: 2024-04-16

## 2024-05-15 RX ORDER — HEPARIN 100 UNIT/ML
500 SYRINGE INTRAVENOUS
Status: CANCELLED | OUTPATIENT
Start: 2024-05-15

## 2024-05-15 RX ORDER — SODIUM CHLORIDE 0.9 % (FLUSH) 0.9 %
10 SYRINGE (ML) INJECTION
Status: CANCELLED | OUTPATIENT
Start: 2024-05-15

## 2024-05-15 RX ORDER — EPINEPHRINE 0.3 MG/.3ML
0.3 INJECTION SUBCUTANEOUS ONCE AS NEEDED
Status: CANCELLED | OUTPATIENT
Start: 2024-05-15

## 2024-05-15 RX ORDER — DIPHENHYDRAMINE HYDROCHLORIDE 50 MG/ML
50 INJECTION INTRAMUSCULAR; INTRAVENOUS ONCE AS NEEDED
Status: CANCELLED | OUTPATIENT
Start: 2024-05-15

## 2024-05-15 NOTE — PROGRESS NOTES
Referring provider:  Dr. Burgess  Reason for consultation:  Triple negative right breast cancer     Diagnosis:     Right breast invasive ductal carcinoma, triple negative  ER negative, ME negative, HER2 IHC 0, Ki-67 85%   [id number], stage IIIC     Treatment history:    02/02/2023-7/26/23:  Weekly Carbo Taxol plus q. 3 weeks pembrolizumab followed by q. 3 weeks AC plus pembro   09/12/2023 - Bilateral mastectomy  10/24/23-11/29/23: Adjuvant radiation therapy    Current Treatment:    12/18/2023- current: Pembrolizumab      HPI  Patient is a 37-year-old with no significant medical history who noticed a mass right breast in November 2022 which led to further workup including mammogram, ultrasound and biopsy, pathology from biopsy in January 2023 revealed triple negative breast cancer, grade 3.  Patient had further workup including a PET-CT with Dr. Burgess not reveal any distant metastatic disease.  She presented to our clinic on 01/23/2023 to establish care for further disease management.      Patient denies any history of smoking, alcohol use or recreational drug use.  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  She has an ECOG of 0.  She has a mother and sister accompanied to her initial visit.  Family history of lung cancer in her grandfather and liver cancer in 1 of her uncles.  No history of breast cancer in the family.    Interval History:   05/15/2024:  Ms. Cordoba is here today for follow-up, labs, and cycle 8/9 of Pembrolizumab for her stage IIIC triple negative right breast cancer.   She is tolerating Pembrolizumab without any untoward side effects.  She reports no new lumps, masses, or skin changes of the chest wall and axillae.   She denies any new onset of headaches, cough, SOB, chest pain, abdominal pain, or bone pain.    Eating and drinking.  Weight is stable,  No recent hospitalizations, infections, or illnesses.     Pathology  01/03/2023 right breast core  biopsy and right axillary lymph node core biopsy:  Invasive ductal carcinoma, grade 3, right axillary lymph node biopsy with fragments of poorly differentiated malignant tumor consistent with ductal carcinoma.  Normal lymph node identified.  ER negative, KS negative, HER2 IHC 0, Ki-67 85.3%.    23 Right Breast radical mastectomy- Right breast found to have no residual invasive or in situ carcinoma. Benign breast Parenchyma with changes consistent with previous biopsy, fibrosis and ductal hyperplasia without atypia. All margins negative for invasive and in situ carcinoma. 3 lymph nodes excised and found to be negative for metastatic carcinoma.     Imagin/09/24 Abdominal US: Unremarkable findings  24 CT Chest w/ contrast: findings consistent with post radiation changes in the right upper lobe, however, there are patchy areas of groundglass opacity in this region, which are nonspecific and may need clinical findings to exclude acute infiltrate/pneumonia. Otherwise no evidence of new malignancy or metastatic disease.  2023 CT abdomen pelvis with contrast:  No evidence of neoplastic disease, small umbilical hernia.    2022 echo EF 60%     01/10/2023 PET-CT:  Hypermetabolic mass in the superior right breast in keeping with the known cancer popliteal additional areas of mild-to-moderate uptake in the superior and retroareolar right breast are understood with definite discrete mass on CT but worrisome for infiltrative breast cancer.  Multiple hypermetabolic right axillary subpectoral and right internal mammary lymph nodes consistent with metastatic disease.  No other suspicious uptake.  Right breast mass measures 3 by 2.8 cm with an SUV of 13.7.  Inferior to the mass there was mild diffuse uptake associated with non-mass like dense breast tissue with an SUV of 4.0.  There is also abnormal uptake in the retroareolar right breast with definitive discrete mass can not be  from the dense  glandular tissue with an SUV of 5.7 and measuring 1.5 x 1.1 cm.  These are concerning for infiltrative malignancies.    Laboratory   05/14/24: CMP WNL, TSH 0.88, cortisol 5.5, wbc 6.99, hgb 11.3, plt 238, ANC 4.84  4/21/24 cr 0.88, TSH 0.6571, WBC 10.61, HGB 11.3, , ANC 8.19  3/30/2024:  Creatinine 0.89, liver enzymes WNL, T. Bili WNL, WBC 6.19, Hgb 11.6, Hct 34.8, Plt 225,000, TSH 0.99, cortisol 14.5.  03/10/24: CMP WNL, TSH 0.58, cortisol 13.3, wbc 7.25, hgb 11.0, plt 208, ANC 5.06  02/15/24 creatinine 0.74, albumin 2.6, LFTs unremarkable, WBC count 7.7, hemoglobin 14.3, .6, platelet count 380, ANC 6.5.    1/28/24: CMP and CBC WNL TSH 1.6687, Cortisol 9.6  01/07/2024: CMP WNL, TSH 0.8660, cortisol 7.7, wbc 5.20, hgb 11.7, plt 197, ANC 2.89  12/17/23: CMP unremarkable, TSH 0.514, cortisol 11.9, WBC 5.89, Hb 12.1, , ANC 3.59    12/04/2023 CMP unremarkable, WBC count 3.46, hemoglobin 11.6, platelet count 2 1 1.  TSH 1.9.  11/13/23: cr 0.78, alb 3.7, ca 9.3, LFTs WNL, TSH 1.5, wbc 8, hgb 11.1, plt 227, ANC 6.08  10/09/23: cr 0.82, alb 3.2, ca 8.9, LFTs WNL, TSH 1.5, wbc 6.93, hgb 9.8, plt 258, ANC 3.10  07/25/23: CMP WNL, wbc 3.83, hgb 9.4, plt 297, ANC 1.55, mag 1.8, phosphorus 4.2  07/04/2023: CMP unremarkable, TSH 0.50, cortisol 8.6, wbc 5.83, hgb 9.7, plt 307, ANC 3.57  06/12/2023:  Creatinine 0.87, albumin 3.7, LFTs within normal limits, calcium 9.5, magnesium 1.8, phosphorus 4.2, TSH 0.33, WBC count 3.9, hemoglobin 9.6, MCV 95, platelet count 327, ANC 1.87.   05/22/23 cr 0.81, alb 3.8, ca 9.6, LFTs WNL, mag 1.8, phosphorus 3.8, wbc 6.99, hgb 9.6, plt 297, ANC 3.95  05/01/23: Cr 0.85, alb 3.8, ca 9.3, AST 45, ALT 70, mag 1.7, phosphorus 3.6, wbc 4.85, hgb 10.2, plt 197, ANC 2.20  04/24/2023:  Creatinine 0.8, albumin 3.6, alkaline phosphatase 77, total bili 0.4, AST 39, ALT 87, magnesium 1.7, phosphorus 3.3, WBC count 9.6, ANC 4.9, platelet count 196, hemoglobin 9.6.  3/23/23: cr 0.79 , Mg 1.7  TSH 1.8  AST 32  wbc 7.92 hgb 10.0 Plt 215  03/15/23: cr 0.81, TB 0.7, mag 2.0, TSH 0.7533, cortisol 17.9, , , wbc 4.62, hgb 11.1, plt 238, ANC 1.89, UPT neg  03/08/2023:  Creatinine 0.83, AST 73, , bilirubin 0.5, TSH 0.75, WBC 5.7, hemoglobin 11, platelets 222  03/02/2023:  WBC count 5.17, hemoglobin 10.2, MCV 91.1, platelet count 265, ANC 1.89, urine pregnancy negative, creatinine 0.9, albu3.5, ca 9.2, alkaline phosphatase 76, total bilirubin 0.3, AST 53, , magnesium 1.8, phosphorus 2.7, TSH 0.9.  02/22/2023: WBC 3.02, Hgb 10.9, MCV 88.0, , ANC 1.4 3, UPT negative, creatinine 0.82 albumin 3.7 magnesium 2.1 phosphorus 2.1, TSH 0.6540, cortisol 8.7  02/15/23 Cr 0.83, alb 3.7, LFTs WNL, mag 1.7, phosphorus 2.0, TSH 0.4434, cortisol 10.6, wbc 3.32, hgb 11.3, plt 266, ANC 1.42 UPT negative   02/08/23: cr 0.79, alb 3.5, ca 9.3, AST 36, ALT 45, mag 1.9, phosphorus 2.1, wbc 4.47, hgb 11.1, plt 264, ANC 2.28  02/01/2023:  Creatinine 0.83, albumin 3.7, LFTs within normal limits, TSH 1.09, cortisol 8.8, free T4 0.98, WBC 7.7, Hb 11.5, MCV 87.9, platelet count 283, ANC 4.09, urine pregnancy negative.    Past Medical History:   Diagnosis Date    Breast cancer     GERD (gastroesophageal reflux disease)      Past Surgical History:   Procedure Laterality Date    ABLATION      MEDIPORT INSERTION, DOUBLE Left 01/26/2023    TUBAL LIGATION         Family History   Problem Relation Name Age of Onset    Diabetes Maternal Grandmother Savanna [place]     Lung cancer Maternal Grandfather [facility]     Liver cancer Maternal Uncle Edgardo Qu[place]emo        Social History     Socioeconomic History    Marital status: Single   Tobacco Use    Smoking status: Never    Smokeless tobacco: Never   Substance and Sexual Activity    Alcohol use: Yes     Comment: occasionally    Drug use: Never    Sexual activity: Yes     Partners: Male       Current Outpatient Medications   Medication Sig Dispense Refill    boric acid  "(PH-D) 600 mg vaginal suppository INSERT 1 SUPPOSITORY VAGINALLY DAILY AS NEEDED      hydrOXYzine pamoate (VISTARIL) 25 MG Cap Take 1 capsule (25 mg total) by mouth every 8 (eight) hours as needed (itching). (Patient taking differently: Take 25 mg by mouth as needed.) 30 capsule 1    LINZESS 290 mcg Cap capsule Take 290 mcg by mouth.      OLANZapine (ZYPREXA) 10 MG tablet Take by mouth.      predniSONE (DELTASONE) 20 MG tablet Take 20 mg by mouth.      promethazine (PHENERGAN) 12.5 MG Tab Take 1 tablet (12.5 mg total) by mouth every 4 (four) hours as needed. 60 tablet 1    promethazine-dextromethorphan (PROMETHAZINE-DM) 6.25-15 mg/5 mL Syrp Take by mouth.      traMADoL (ULTRAM) 50 mg tablet Take 50 mg by mouth every 4 (four) hours.       No current facility-administered medications for this visit.     Review of patient's allergies indicates:  No Known Allergies         Physical Exam:   Blood pressure 119/77, pulse 92, temperature 97.8 °F (36.6 °C), temperature source Oral, resp. rate 18, height 5' 6" (1.676 m), weight 92 kg (202 lb 12.8 oz), SpO2 100%.     ECOG PS 0  GA: AAOx3, NAD  HEENT: NCAT, no tenderness to head with palpation. good dentition, moist oral mucous membranes.  LYMPH: no cervical, axillary or supraclavicular adenopathy  CVS: s1s2 RRR, no M/R/G  RESP: CTA b/l, no crackles, no wheezes or rhonchi  BREAST:  Right breast with well-healed incision without nodularity, new lumps or bumps or skin changes.  Right axilla without evidence of adenopathy.  ABD: soft, NT, ND, BS+, no hepatosplenomegaly  EXT: no deformities, no pedal edema  SKIN: no rashes, warm and dry  NEURO: normal mentation, strength 5/5 on all 4 extremities, no sensory deficits      Assessment:       # Right breast invasive ductal carcinoma, triple negative  ER negative, MA negative, HER2 IHC 0, Ki-67 85%   [id number], stage IIIC   Discussed with patient the diagnosis of triple negative breast cancer, treatment recommendations including " neoadjuvant chemotherapy followed by surgery followed by adjuvant chemotherapy + radiation therapy   Reviewed PET-CT without evidence of distant metastatic disease.  Will plan for treatment with neoadjuvant carbo Taxol pembrolizumab followed by AC pembrolizumab prior to surgical resection   S/p MRI bilateral breasts   Patient is not interested in fertility preservation  She is status post tubal ligation, discuss the need for dual contraception while on chemotherapy.  Baseline Echo 60%  S/p port placement and chemo ed  Status post neoadjuvant chemotherapy and immunotherapy  Genetic counseling done, negative.  S/P Bilateral Mastectomy, with complete pathological response  Status post adjuvant radiation therapy on 11/29/2023  Initiated adjuvant pembrolizumab on 12/18/2023    Plan  Reviewed labs, proceed with cycle 8/9 adjuvant pembrolizumab today.   Plan to follow-up in 3 weeks, labs the day prior to treatment to assess treatment tolerance.    The patient is in agreement with today's plan of care.  All questions answered.  Patient is aware to contact our office for any problems or concerns prior to next visit.

## 2024-06-05 ENCOUNTER — OFFICE VISIT (OUTPATIENT)
Dept: HEMATOLOGY/ONCOLOGY | Facility: CLINIC | Age: 39
End: 2024-06-05
Payer: MEDICAID

## 2024-06-05 VITALS
RESPIRATION RATE: 14 BRPM | BODY MASS INDEX: 32.93 KG/M2 | SYSTOLIC BLOOD PRESSURE: 109 MMHG | HEART RATE: 121 BPM | HEIGHT: 66 IN | WEIGHT: 204.88 LBS | TEMPERATURE: 100 F | OXYGEN SATURATION: 99 % | DIASTOLIC BLOOD PRESSURE: 66 MMHG

## 2024-06-05 DIAGNOSIS — Z29.89 ENCOUNTER FOR IMMUNOTHERAPY: ICD-10-CM

## 2024-06-05 DIAGNOSIS — C50.411 MALIGNANT NEOPLASM OF UPPER-OUTER QUADRANT OF RIGHT BREAST IN FEMALE, ESTROGEN RECEPTOR NEGATIVE: Primary | ICD-10-CM

## 2024-06-05 DIAGNOSIS — R05.9 COUGH, UNSPECIFIED TYPE: ICD-10-CM

## 2024-06-05 DIAGNOSIS — Z17.1 MALIGNANT NEOPLASM OF UPPER-OUTER QUADRANT OF RIGHT BREAST IN FEMALE, ESTROGEN RECEPTOR NEGATIVE: Primary | ICD-10-CM

## 2024-06-05 PROCEDURE — 99214 OFFICE O/P EST MOD 30 MIN: CPT | Mod: ,,, | Performed by: STUDENT IN AN ORGANIZED HEALTH CARE EDUCATION/TRAINING PROGRAM

## 2024-06-05 PROCEDURE — 1159F MED LIST DOCD IN RCRD: CPT | Mod: CPTII,,, | Performed by: STUDENT IN AN ORGANIZED HEALTH CARE EDUCATION/TRAINING PROGRAM

## 2024-06-05 PROCEDURE — 3078F DIAST BP <80 MM HG: CPT | Mod: CPTII,,, | Performed by: STUDENT IN AN ORGANIZED HEALTH CARE EDUCATION/TRAINING PROGRAM

## 2024-06-05 PROCEDURE — 3074F SYST BP LT 130 MM HG: CPT | Mod: CPTII,,, | Performed by: STUDENT IN AN ORGANIZED HEALTH CARE EDUCATION/TRAINING PROGRAM

## 2024-06-05 PROCEDURE — 3008F BODY MASS INDEX DOCD: CPT | Mod: CPTII,,, | Performed by: STUDENT IN AN ORGANIZED HEALTH CARE EDUCATION/TRAINING PROGRAM

## 2024-06-05 RX ORDER — DEXTROMETHORPHAN HBR AND GUAIFENESIN 5; 100 MG/5ML; MG/5ML
5 LIQUID ORAL EVERY 6 HOURS PRN
Qty: 117 ML | Refills: 0 | Status: SHIPPED | OUTPATIENT
Start: 2024-06-05 | End: 2024-06-15

## 2024-06-05 RX ORDER — LEVOFLOXACIN 500 MG/1
500 TABLET, FILM COATED ORAL
COMMUNITY
Start: 2024-06-04 | End: 2024-06-14

## 2024-06-05 NOTE — PROGRESS NOTES
Referring provider:  Dr. Burgess  Reason for consultation:  Triple negative right breast cancer     Diagnosis:     Right breast invasive ductal carcinoma, triple negative  ER negative, [place] negative, HER2 IHC 0, Ki-67 85%   [id number], stage IIIC     Treatment history:    02/02/2023-7/26/23:  Weekly Carbo Taxol plus q. 3 weeks pembrolizumab followed by q. 3 weeks AC plus pembro   09/12/2023 - Bilateral mastectomy  10/24/23-11/29/23: Adjuvant radiation therapy    Current Treatment:    12/18/2023- current: Pembrolizumab      HPI  Patient is a 37-year-old with no significant medical history who noticed a mass right breast in November 2022 which led to further workup including mammogram, ultrasound and biopsy, pathology from biopsy in January 2023 revealed triple negative breast cancer, grade 3.  Patient had further workup including a PET-CT with Dr. Burgess not reveal any distant metastatic disease.  She presented to our clinic on 01/23/2023 to establish care for further disease management.      Patient denies any history of smoking, alcohol use or recreational drug use.  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  She has an ECOG of 0.  She has a mother and sister accompanied to her initial visit.  Family history of lung cancer in her grandfather and liver cancer in 1 of her uncles.  No history of breast cancer in the family.    Interval History:     Today, 06/05/2024, patient reports symptoms of productive cough with green phlegm.  She reports shortness of breath as well as palpitations.  She was seen in the ER yesterday a CT chest PE protocol which was negative for PE but did reveal signs of consolidation consistent with pneumonia.  She was started on p.o. antibiotics with levofloxacin yesterday.  She denies any fevers, chills, chest pain.  She denies any new lumps or bumps.  She reports tolerating her last cycle of treatment well.    Pathology  01/03/2023 right breast  core biopsy and right axillary lymph node core biopsy:  Invasive ductal carcinoma, grade 3, right axillary lymph node biopsy with fragments of poorly differentiated malignant tumor consistent with ductal carcinoma.  Normal lymph node identified.  ER negative, GA negative, HER2 IHC 0, Ki-67 85.3%.    23 Right Breast radical mastectomy- Right breast found to have no residual invasive or in situ carcinoma. Benign breast Parenchyma with changes consistent with previous biopsy, fibrosis and ductal hyperplasia without atypia. All margins negative for invasive and in situ carcinoma. 3 lymph nodes excised and found to be negative for metastatic carcinoma.     Imagin2024 CT chest PE protocol:  No PE.  Heterogeneous ground-glass and consolidative opacities in the anterior right lung in the upper middle and lower lobes.  This may reflect posttreatment change but recommend clinical correlation pneumonia.  Neoplastic disease also can not be excluded.  Comparison with prior exam is recommended if available.    24 Abdominal US: Unremarkable findings  24 CT Chest w/ contrast: findings consistent with post radiation changes in the right upper lobe, however, there are patchy areas of groundglass opacity in this region, which are nonspecific and may need clinical findings to exclude acute infiltrate/pneumonia. Otherwise no evidence of new malignancy or metastatic disease.  2023 CT abdomen pelvis with contrast:  No evidence of neoplastic disease, small umbilical hernia.    2022 echo EF 60%     01/10/2023 PET-CT:  Hypermetabolic mass in the superior right breast in keeping with the known cancer popliteal additional areas of mild-to-moderate uptake in the superior and retroareolar right breast are understood with definite discrete mass on CT but worrisome for infiltrative breast cancer.  Multiple hypermetabolic right axillary subpectoral and right internal mammary lymph nodes consistent with  metastatic disease.  No other suspicious uptake.  Right breast mass measures 3 by 2.8 cm with an SUV of 13.7.  Inferior to the mass there was mild diffuse uptake associated with non-mass like dense breast tissue with an SUV of 4.0.  There is also abnormal uptake in the retroareolar right breast with definitive discrete mass can not be  from the dense glandular tissue with an SUV of 5.7 and measuring 1.5 x 1.1 cm.  These are concerning for infiltrative malignancies.    Laboratory     06/04/2024 CMP unremarkable, TSH 0.8, cortisol 14.5, WBC count 9.4, hemoglobin 12.3, MCV 92.1, platelet count 2 1 3, ANC 7.1.  05/14/24: CMP WNL, TSH 0.88, cortisol 5.5, wbc 6.99, hgb 11.3, plt 238, ANC 4.84  4/21/24 cr 0.88, TSH 0.6571, WBC 10.61, HGB 11.3, , ANC 8.19  3/30/2024:  Creatinine 0.89, liver enzymes WNL, T. Bili WNL, WBC 6.19, Hgb 11.6, Hct 34.8, Plt 225,000, TSH 0.99, cortisol 14.5.  03/10/24: CMP WNL, TSH 0.58, cortisol 13.3, wbc 7.25, hgb 11.0, plt 208, ANC 5.06  02/15/24 creatinine 0.74, albumin 2.6, LFTs unremarkable, WBC count 7.7, hemoglobin 14.3, .6, platelet count 380, ANC 6.5.    1/28/24: CMP and CBC WNL TSH 1.6687, Cortisol 9.6  01/07/2024: CMP WNL, TSH 0.8660, cortisol 7.7, wbc 5.20, hgb 11.7, plt 197, ANC 2.89  12/17/23: CMP unremarkable, TSH 0.514, cortisol 11.9, WBC 5.89, Hb 12.1, , ANC 3.59    12/04/2023 CMP unremarkable, WBC count 3.46, hemoglobin 11.6, platelet count 2 1 1.  TSH 1.9.  11/13/23: cr 0.78, alb 3.7, ca 9.3, LFTs WNL, TSH 1.5, wbc 8, hgb 11.1, plt 227, ANC 6.08  10/09/23: cr 0.82, alb 3.2, ca 8.9, LFTs WNL, TSH 1.5, wbc 6.93, hgb 9.8, plt 258, ANC 3.10  07/25/23: CMP WNL, wbc 3.83, hgb 9.4, plt 297, ANC 1.55, mag 1.8, phosphorus 4.2  07/04/2023: CMP unremarkable, TSH 0.50, cortisol 8.6, wbc 5.83, hgb 9.7, plt 307, ANC 3.57  06/12/2023:  Creatinine 0.87, albumin 3.7, LFTs within normal limits, calcium 9.5, magnesium 1.8, phosphorus 4.2, TSH 0.33, WBC count 3.9,  hemoglobin 9.6, MCV 95, platelet count 327, ANC 1.87.   05/22/23 cr 0.81, alb 3.8, ca 9.6, LFTs WNL, mag 1.8, phosphorus 3.8, wbc 6.99, hgb 9.6, plt 297, ANC 3.95  05/01/23: Cr 0.85, alb 3.8, ca 9.3, AST 45, ALT 70, mag 1.7, phosphorus 3.6, wbc 4.85, hgb 10.2, plt 197, ANC 2.20  04/24/2023:  Creatinine 0.8, albumin 3.6, alkaline phosphatase 77, total bili 0.4, AST 39, ALT 87, magnesium 1.7, phosphorus 3.3, WBC count 9.6, ANC 4.9, platelet count 196, hemoglobin 9.6.  3/23/23: cr 0.79 , Mg 1.7 TSH 1.8  AST 32  wbc 7.92 hgb 10.0 Plt 215  03/15/23: cr 0.81, TB 0.7, mag 2.0, TSH 0.7533, cortisol 17.9, , , wbc 4.62, hgb 11.1, plt 238, ANC 1.89, UPT neg  03/08/2023:  Creatinine 0.83, AST 73, , bilirubin 0.5, TSH 0.75, WBC 5.7, hemoglobin 11, platelets 222  03/02/2023:  WBC count 5.17, hemoglobin 10.2, MCV 91.1, platelet count 265, ANC 1.89, urine pregnancy negative, creatinine 0.9, albu3.5, ca 9.2, alkaline phosphatase 76, total bilirubin 0.3, AST 53, , magnesium 1.8, phosphorus 2.7, TSH 0.9.  02/22/2023: WBC 3.02, Hgb 10.9, MCV 88.0, , ANC 1.4 3, UPT negative, creatinine 0.82 albumin 3.7 magnesium 2.1 phosphorus 2.1, TSH 0.6540, cortisol 8.7  02/15/23 Cr 0.83, alb 3.7, LFTs WNL, mag 1.7, phosphorus 2.0, TSH 0.4434, cortisol 10.6, wbc 3.32, hgb 11.3, plt 266, ANC 1.42 UPT negative   02/08/23: cr 0.79, alb 3.5, ca 9.3, AST 36, ALT 45, mag 1.9, phosphorus 2.1, wbc 4.47, hgb 11.1, plt 264, ANC 2.28  02/01/2023:  Creatinine 0.83, albumin 3.7, LFTs within normal limits, TSH 1.09, cortisol 8.8, free T4 0.98, WBC 7.7, Hb 11.5, MCV 87.9, platelet count 283, ANC 4.09, urine pregnancy negative.    Past Medical History:   Diagnosis Date    Breast cancer     GERD (gastroesophageal reflux disease)      Past Surgical History:   Procedure Laterality Date    ABLATION      MEDIPORT INSERTION, DOUBLE Left 01/26/2023    TUBAL LIGATION         Family History   Problem Relation Name Age of Onset     Diabetes Maternal Grandmother [facility]     Lung cancer Maternal Grandfather [facility]     Liver cancer Maternal Uncle Edgardo [place]        Social History     Socioeconomic History    Marital status: Single   Tobacco Use    Smoking status: Never    Smokeless tobacco: Never   Substance and Sexual Activity    Alcohol use: Yes     Comment: occasionally    Drug use: Never    Sexual activity: Yes     Partners: Male       Current Outpatient Medications   Medication Sig Dispense Refill    boric acid (PH-D) 600 mg vaginal suppository INSERT 1 SUPPOSITORY VAGINALLY DAILY AS NEEDED      hydrOXYzine pamoate (VISTARIL) 25 MG Cap Take 1 capsule (25 mg total) by mouth every 8 (eight) hours as needed (itching). (Patient taking differently: Take 25 mg by mouth as needed.) 30 capsule 1    LINZESS 290 mcg Cap capsule Take 290 mcg by mouth.      OLANZapine (ZYPREXA) 10 MG tablet Take by mouth.      predniSONE (DELTASONE) 20 MG tablet Take 20 mg by mouth.      promethazine (PHENERGAN) 12.5 MG Tab Take 1 tablet (12.5 mg total) by mouth every 4 (four) hours as needed. 60 tablet 1    promethazine-dextromethorphan (PROMETHAZINE-DM) 6.25-15 mg/5 mL Syrp Take by mouth.      traMADoL (ULTRAM) 50 mg tablet Take 50 mg by mouth every 4 (four) hours.       No current facility-administered medications for this visit.     Review of patient's allergies indicates:  No Known Allergies         Physical Exam:   There were no vitals taken for this visit.     ECOG PS 0  GA: AAOx3, NAD  HEENT: NCAT, no tenderness to head with palpation. good dentition, moist oral mucous membranes.  LYMPH: no cervical, axillary or supraclavicular adenopathy  CVS: s1s2 RRR, no M/R/G  RESP: CTA b/l, no crackles, no wheezes or rhonchi  BREAST:  Right breast with well-healed incision without nodularity, new lumps or bumps or skin changes.  Right axilla without evidence of adenopathy.  ABD: soft, NT, ND, BS+, no hepatosplenomegaly  EXT: no deformities, no pedal edema  SKIN: no  rashes, warm and dry  NEURO: normal mentation, strength 5/5 on all 4 extremities, no sensory deficits      Assessment:       # Right breast invasive ductal carcinoma, triple negative  ER negative, [place] negative, HER2 IHC 0, Ki-67 85%   [id number], stage IIIC   Discussed with patient the diagnosis of triple negative breast cancer, treatment recommendations including neoadjuvant chemotherapy followed by surgery followed by adjuvant chemotherapy + radiation therapy   Reviewed PET-CT without evidence of distant metastatic disease.  Will plan for treatment with neoadjuvant carbo Taxol pembrolizumab followed by AC pembrolizumab prior to surgical resection   S/p MRI bilateral breasts   Patient is not interested in fertility preservation  She is status post tubal ligation, discuss the need for dual contraception while on chemotherapy.  Baseline Echo 60%  S/p port placement and chemo ed  Status post neoadjuvant chemotherapy and immunotherapy  Genetic counseling done, negative.  S/P Bilateral Mastectomy, with complete pathological response  Status post adjuvant radiation therapy on 11/29/2023  Initiated adjuvant pembrolizumab on 12/18/2023    Plan  Will hold treatment today given diagnosis of pneumonia and that she is on p.o. antibiotics currently   Plan to follow-up in 2 weeks, labs the day prior to next cycle of treatment.      Portions of the record may have been created with voice recognition software. Occasional wrong-word or sound-a-like substitutions may have occurred due to the inherent limitations of voice recognition software.

## 2024-06-18 NOTE — PROGRESS NOTES
Referring provider:  Dr. Burgess  Reason for consultation:  Triple negative right breast cancer     Diagnosis:     Right breast invasive ductal carcinoma, triple negative  ER negative, AK negative, HER2 IHC 0, Ki-67 85%   [id number], stage IIIC     Treatment history:    02/02/2023-7/26/23:  Weekly Carbo Taxol plus q. 3 weeks pembrolizumab followed by q. 3 weeks AC plus pembro   09/12/2023 - Bilateral mastectomy  10/24/23-11/29/23: Adjuvant radiation therapy    Current Treatment:    12/18/2023- current: Pembrolizumab      HPI  Patient is a 37-year-old with no significant medical history who noticed a mass right breast in November 2022 which led to further workup including mammogram, ultrasound and biopsy, pathology from biopsy in January 2023 revealed triple negative breast cancer, grade 3.  Patient had further workup including a PET-CT with Dr. Burgess not reveal any distant metastatic disease.  She presented to our clinic on 01/23/2023 to establish care for further disease management.      Patient denies any history of smoking, alcohol use or recreational drug use.  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  She has an ECOG of 0.  She has a mother and sister accompanied to her initial visit.  Family history of lung cancer in her grandfather and liver cancer in 1 of her uncles.  No history of breast cancer in the family.    Interval History:     Today, 6/19/24, patient is doing well since hospitalization with pneumonia. She denies any fevers, chills, chest pain.  She denies any new lumps or bumps.  She reports tolerating her last cycle of treatment well. Today is her last Keytruda treatment.    Pathology  01/03/2023 right breast core biopsy and right axillary lymph node core biopsy:  Invasive ductal carcinoma, grade 3, right axillary lymph node biopsy with fragments of poorly differentiated malignant tumor consistent with ductal carcinoma.  Normal lymph node  identified.  ER negative, SD negative, HER2 IHC 0, Ki-67 85.3%.    23 Right Breast radical mastectomy- Right breast found to have no residual invasive or in situ carcinoma. Benign breast Parenchyma with changes consistent with previous biopsy, fibrosis and ductal hyperplasia without atypia. All margins negative for invasive and in situ carcinoma. 3 lymph nodes excised and found to be negative for metastatic carcinoma.     Imagin2024 CT chest PE protocol:  No PE.  Heterogeneous ground-glass and consolidative opacities in the anterior right lung in the upper middle and lower lobes.  This may reflect posttreatment change but recommend clinical correlation pneumonia.  Neoplastic disease also can not be excluded.  Comparison with prior exam is recommended if available.    24 Abdominal US: Unremarkable findings  24 CT Chest w/ contrast: findings consistent with post radiation changes in the right upper lobe, however, there are patchy areas of groundglass opacity in this region, which are nonspecific and may need clinical findings to exclude acute infiltrate/pneumonia. Otherwise no evidence of new malignancy or metastatic disease.  2023 CT abdomen pelvis with contrast:  No evidence of neoplastic disease, small umbilical hernia.    2022 echo EF 60%     01/10/2023 PET-CT:  Hypermetabolic mass in the superior right breast in keeping with the known cancer popliteal additional areas of mild-to-moderate uptake in the superior and retroareolar right breast are understood with definite discrete mass on CT but worrisome for infiltrative breast cancer.  Multiple hypermetabolic right axillary subpectoral and right internal mammary lymph nodes consistent with metastatic disease.  No other suspicious uptake.  Right breast mass measures 3 by 2.8 cm with an SUV of 13.7.  Inferior to the mass there was mild diffuse uptake associated with non-mass like dense breast tissue with an SUV of 4.0.  There  is also abnormal uptake in the retroareolar right breast with definitive discrete mass can not be  from the dense glandular tissue with an SUV of 5.7 and measuring 1.5 x 1.1 cm.  These are concerning for infiltrative malignancies.    Laboratory   06/19/2024 CMP unremarkable, TSH 1.1323, cortisol 15.7, WBC count 7.63, hemoglobin 11.9, MCV 92.1, platelet count 276, ANC 5.18.    06/04/2024 CMP unremarkable, TSH 0.8, cortisol 14.5, WBC count 9.4, hemoglobin 12.3, MCV 92.1, platelet count 2 1 3, ANC 7.1.  05/14/24: CMP WNL, TSH 0.88, cortisol 5.5, wbc 6.99, hgb 11.3, plt 238, ANC 4.84  4/21/24 cr 0.88, TSH 0.6571, WBC 10.61, HGB 11.3, , ANC 8.19  3/30/2024:  Creatinine 0.89, liver enzymes WNL, T. Bili WNL, WBC 6.19, Hgb 11.6, Hct 34.8, Plt 225,000, TSH 0.99, cortisol 14.5.  03/10/24: CMP WNL, TSH 0.58, cortisol 13.3, wbc 7.25, hgb 11.0, plt 208, ANC 5.06  02/15/24 creatinine 0.74, albumin 2.6, LFTs unremarkable, WBC count 7.7, hemoglobin 14.3, .6, platelet count 380, ANC 6.5.    1/28/24: CMP and CBC WNL TSH 1.6687, Cortisol 9.6  01/07/2024: CMP WNL, TSH 0.8660, cortisol 7.7, wbc 5.20, hgb 11.7, plt 197, ANC 2.89  12/17/23: CMP unremarkable, TSH 0.514, cortisol 11.9, WBC 5.89, Hb 12.1, , ANC 3.59    12/04/2023 CMP unremarkable, WBC count 3.46, hemoglobin 11.6, platelet count 2 1 1.  TSH 1.9.  11/13/23: cr 0.78, alb 3.7, ca 9.3, LFTs WNL, TSH 1.5, wbc 8, hgb 11.1, plt 227, ANC 6.08  10/09/23: cr 0.82, alb 3.2, ca 8.9, LFTs WNL, TSH 1.5, wbc 6.93, hgb 9.8, plt 258, ANC 3.10  07/25/23: CMP WNL, wbc 3.83, hgb 9.4, plt 297, ANC 1.55, mag 1.8, phosphorus 4.2  07/04/2023: CMP unremarkable, TSH 0.50, cortisol 8.6, wbc 5.83, hgb 9.7, plt 307, ANC 3.57  06/12/2023:  Creatinine 0.87, albumin 3.7, LFTs within normal limits, calcium 9.5, magnesium 1.8, phosphorus 4.2, TSH 0.33, WBC count 3.9, hemoglobin 9.6, MCV 95, platelet count 327, ANC 1.87.   05/22/23 cr 0.81, alb 3.8, ca 9.6, LFTs WNL, mag 1.8,  phosphorus 3.8, wbc 6.99, hgb 9.6, plt 297, ANC 3.95  05/01/23: Cr 0.85, alb 3.8, ca 9.3, AST 45, ALT 70, mag 1.7, phosphorus 3.6, wbc 4.85, hgb 10.2, plt 197, ANC 2.20  04/24/2023:  Creatinine 0.8, albumin 3.6, alkaline phosphatase 77, total bili 0.4, AST 39, ALT 87, magnesium 1.7, phosphorus 3.3, WBC count 9.6, ANC 4.9, platelet count 196, hemoglobin 9.6.  3/23/23: cr 0.79 , Mg 1.7 TSH 1.8  AST 32  wbc 7.92 hgb 10.0 Plt 215  03/15/23: cr 0.81, TB 0.7, mag 2.0, TSH 0.7533, cortisol 17.9, , , wbc 4.62, hgb 11.1, plt 238, ANC 1.89, UPT neg  03/08/2023:  Creatinine 0.83, AST 73, , bilirubin 0.5, TSH 0.75, WBC 5.7, hemoglobin 11, platelets 222  03/02/2023:  WBC count 5.17, hemoglobin 10.2, MCV 91.1, platelet count 265, ANC 1.89, urine pregnancy negative, creatinine 0.9, albu3.5, ca 9.2, alkaline phosphatase 76, total bilirubin 0.3, AST 53, , magnesium 1.8, phosphorus 2.7, TSH 0.9.  02/22/2023: WBC 3.02, Hgb 10.9, MCV 88.0, , ANC 1.4 3, UPT negative, creatinine 0.82 albumin 3.7 magnesium 2.1 phosphorus 2.1, TSH 0.6540, cortisol 8.7  02/15/23 Cr 0.83, alb 3.7, LFTs WNL, mag 1.7, phosphorus 2.0, TSH 0.4434, cortisol 10.6, wbc 3.32, hgb 11.3, plt 266, ANC 1.42 UPT negative   02/08/23: cr 0.79, alb 3.5, ca 9.3, AST 36, ALT 45, mag 1.9, phosphorus 2.1, wbc 4.47, hgb 11.1, plt 264, ANC 2.28  02/01/2023:  Creatinine 0.83, albumin 3.7, LFTs within normal limits, TSH 1.09, cortisol 8.8, free T4 0.98, WBC 7.7, Hb 11.5, MCV 87.9, platelet count 283, ANC 4.09, urine pregnancy negative.    Past Medical History:   Diagnosis Date    Breast cancer     GERD (gastroesophageal reflux disease)      Past Surgical History:   Procedure Laterality Date    ABLATION      MEDIPORT INSERTION, DOUBLE Left 01/26/2023    TUBAL LIGATION         Family History   Problem Relation Name Age of Onset    Diabetes Maternal Grandmother [facility]     Lung cancer Maternal Grandfather [facility]     Liver cancer  Maternal Uncle Edgardo [place]        Social History     Socioeconomic History    Marital status: Single   Tobacco Use    Smoking status: Never    Smokeless tobacco: Never   Substance and Sexual Activity    Alcohol use: Yes     Comment: occasionally    Drug use: Never    Sexual activity: Yes     Partners: Male       Current Outpatient Medications   Medication Sig Dispense Refill    boric acid (PH-D) 600 mg vaginal suppository INSERT 1 SUPPOSITORY VAGINALLY DAILY AS NEEDED      hydrOXYzine pamoate (VISTARIL) 25 MG Cap Take 1 capsule (25 mg total) by mouth every 8 (eight) hours as needed (itching). (Patient not taking: Reported on 6/5/2024) 30 capsule 1    LINZESS 290 mcg Cap capsule Take 290 mcg by mouth.      OLANZapine (ZYPREXA) 10 MG tablet Take by mouth. (Patient not taking: Reported on 6/5/2024)      predniSONE (DELTASONE) 20 MG tablet Take 20 mg by mouth. (Patient not taking: Reported on 6/5/2024)      promethazine (PHENERGAN) 12.5 MG Tab Take 1 tablet (12.5 mg total) by mouth every 4 (four) hours as needed. 60 tablet 1    promethazine-dextromethorphan (PROMETHAZINE-DM) 6.25-15 mg/5 mL Syrp Take by mouth. (Patient not taking: Reported on 6/5/2024)      traMADoL (ULTRAM) 50 mg tablet Take 50 mg by mouth every 4 (four) hours. (Patient not taking: Reported on 6/5/2024)       No current facility-administered medications for this visit.     Review of patient's allergies indicates:  No Known Allergies         Physical Exam:   There were no vitals taken for this visit.     ECOG PS 0  GA: AAOx3, NAD  HEENT: NCAT, no tenderness to head with palpation. good dentition, moist oral mucous membranes.  LYMPH: no cervical, axillary or supraclavicular adenopathy  CVS: s1s2 RRR, no M/R/G  RESP: CTA b/l, no crackles, no wheezes or rhonchi  BREAST:  Right breast with well-healed incision without nodularity, new lumps or bumps or skin changes.  Right axilla without evidence of adenopathy.  ABD: soft, NT, ND, BS+, no  hepatosplenomegaly  EXT: no deformities, no pedal edema  SKIN: no rashes, warm and dry  NEURO: normal mentation, strength 5/5 on all 4 extremities, no sensory deficits      Assessment:       # Right breast invasive ductal carcinoma, triple negative  ER negative, NE negative, HER2 IHC 0, Ki-67 85%   [id number], stage IIIC   Discussed with patient the diagnosis of triple negative breast cancer, treatment recommendations including neoadjuvant chemotherapy followed by surgery followed by adjuvant chemotherapy + radiation therapy   Reviewed PET-CT without evidence of distant metastatic disease.  Will plan for treatment with neoadjuvant carbo Taxol pembrolizumab followed by AC pembrolizumab prior to surgical resection   S/p MRI bilateral breasts   Patient is not interested in fertility preservation  She is status post tubal ligation, discuss the need for dual contraception while on chemotherapy.  Baseline Echo 60%  S/p port placement and chemo ed  Status post neoadjuvant chemotherapy and immunotherapy  Genetic counseling done, negative.  S/P Bilateral Mastectomy, with complete pathological response  Status post adjuvant radiation therapy on 11/29/2023  Initiated adjuvant pembrolizumab on 12/18/2023    Plan  Reviewed Labs, ok to receive final Keytruda today  Will follow up in three months with labs for first surveillance visit post treatment.    Brunilda ALLEN-C

## 2024-06-19 ENCOUNTER — OFFICE VISIT (OUTPATIENT)
Dept: HEMATOLOGY/ONCOLOGY | Facility: CLINIC | Age: 39
End: 2024-06-19
Payer: MEDICAID

## 2024-06-19 VITALS
TEMPERATURE: 98 F | WEIGHT: 203.5 LBS | OXYGEN SATURATION: 99 % | HEIGHT: 66 IN | HEART RATE: 94 BPM | DIASTOLIC BLOOD PRESSURE: 78 MMHG | BODY MASS INDEX: 32.71 KG/M2 | SYSTOLIC BLOOD PRESSURE: 115 MMHG | RESPIRATION RATE: 18 BRPM

## 2024-06-19 DIAGNOSIS — Z29.89 ENCOUNTER FOR IMMUNOTHERAPY: ICD-10-CM

## 2024-06-19 DIAGNOSIS — C50.411 MALIGNANT NEOPLASM OF UPPER-OUTER QUADRANT OF RIGHT BREAST IN FEMALE, ESTROGEN RECEPTOR NEGATIVE: Primary | ICD-10-CM

## 2024-06-19 DIAGNOSIS — Z17.1 MALIGNANT NEOPLASM OF UPPER-OUTER QUADRANT OF RIGHT BREAST IN FEMALE, ESTROGEN RECEPTOR NEGATIVE: Primary | ICD-10-CM

## 2024-06-19 PROCEDURE — 3074F SYST BP LT 130 MM HG: CPT | Mod: CPTII,,,

## 2024-06-19 PROCEDURE — 99215 OFFICE O/P EST HI 40 MIN: CPT | Mod: ,,,

## 2024-06-19 PROCEDURE — 1159F MED LIST DOCD IN RCRD: CPT | Mod: CPTII,,,

## 2024-06-19 PROCEDURE — 3078F DIAST BP <80 MM HG: CPT | Mod: CPTII,,,

## 2024-06-19 PROCEDURE — 1160F RVW MEDS BY RX/DR IN RCRD: CPT | Mod: CPTII,,,

## 2024-06-19 PROCEDURE — 3008F BODY MASS INDEX DOCD: CPT | Mod: CPTII,,,

## 2024-06-19 RX ORDER — EPINEPHRINE 0.3 MG/.3ML
0.3 INJECTION SUBCUTANEOUS ONCE AS NEEDED
OUTPATIENT
Start: 2024-06-19

## 2024-06-19 RX ORDER — HEPARIN 100 UNIT/ML
500 SYRINGE INTRAVENOUS
OUTPATIENT
Start: 2024-06-19

## 2024-06-19 RX ORDER — SODIUM CHLORIDE 0.9 % (FLUSH) 0.9 %
10 SYRINGE (ML) INJECTION
OUTPATIENT
Start: 2024-06-19

## 2024-06-19 RX ORDER — DIPHENHYDRAMINE HYDROCHLORIDE 50 MG/ML
50 INJECTION INTRAMUSCULAR; INTRAVENOUS ONCE AS NEEDED
OUTPATIENT
Start: 2024-06-19

## 2024-09-16 ENCOUNTER — OFFICE VISIT (OUTPATIENT)
Dept: HEMATOLOGY/ONCOLOGY | Facility: CLINIC | Age: 39
End: 2024-09-16
Payer: MEDICAID

## 2024-09-16 VITALS
OXYGEN SATURATION: 100 % | BODY MASS INDEX: 31.93 KG/M2 | DIASTOLIC BLOOD PRESSURE: 67 MMHG | TEMPERATURE: 98 F | HEART RATE: 74 BPM | SYSTOLIC BLOOD PRESSURE: 96 MMHG | HEIGHT: 66 IN | WEIGHT: 198.69 LBS | RESPIRATION RATE: 18 BRPM

## 2024-09-16 DIAGNOSIS — C50.411 MALIGNANT NEOPLASM OF UPPER-OUTER QUADRANT OF RIGHT BREAST IN FEMALE, ESTROGEN RECEPTOR NEGATIVE: Primary | ICD-10-CM

## 2024-09-16 DIAGNOSIS — Z17.1 MALIGNANT NEOPLASM OF UPPER-OUTER QUADRANT OF RIGHT BREAST IN FEMALE, ESTROGEN RECEPTOR NEGATIVE: Primary | ICD-10-CM

## 2024-09-16 PROCEDURE — 3008F BODY MASS INDEX DOCD: CPT | Mod: CPTII,,,

## 2024-09-16 PROCEDURE — 99214 OFFICE O/P EST MOD 30 MIN: CPT | Mod: ,,,

## 2024-09-16 PROCEDURE — 3078F DIAST BP <80 MM HG: CPT | Mod: CPTII,,,

## 2024-09-16 PROCEDURE — 1159F MED LIST DOCD IN RCRD: CPT | Mod: CPTII,,,

## 2024-09-16 PROCEDURE — 3074F SYST BP LT 130 MM HG: CPT | Mod: CPTII,,,

## 2024-09-16 NOTE — PROGRESS NOTES
Referring provider:  Dr. Burgess  Reason for consultation:  Triple negative right breast cancer     Diagnosis:     Right breast invasive ductal carcinoma, triple negative  ER negative, OK negative, HER2 IHC 0, Ki-67 85%   [id number], stage IIIC     Treatment history:    02/02/2023-7/26/23:  Weekly Carbo Taxol plus q. 3 weeks pembrolizumab followed by q. 3 weeks AC plus pembro   09/12/2023 - Bilateral mastectomy  10/24/23-11/29/23: Adjuvant radiation therapy    Current Treatment:    12/18/2023- 06/19/24: Pembrolizumab      HPI  Patient is a 37-year-old with no significant medical history who noticed a mass right breast in November 2022 which led to further workup including mammogram, ultrasound and biopsy, pathology from biopsy in January 2023 revealed triple negative breast cancer, grade 3.  Patient had further workup including a PET-CT with Dr. Burgess not reveal any distant metastatic disease.  She presented to our clinic on 01/23/2023 to establish care for further disease management.      Patient denies any history of smoking, alcohol use or recreational drug use.  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  She has an ECOG of 0.  She has a mother and sister accompanied to her initial visit.  Family history of lung cancer in her grandfather and liver cancer in 1 of her uncles.  No history of breast cancer in the family.    Interval History:     Today, 09/16/24, patient returns for breast cancer surveillance. She denies any fevers, chills, chest pain.  She denies any new lumps or bumps.  She had a complete hysterectomy since her last follow-up. She does report periodic insomnia.     Pathology  01/03/2023 right breast core biopsy and right axillary lymph node core biopsy:  Invasive ductal carcinoma, grade 3, right axillary lymph node biopsy with fragments of poorly differentiated malignant tumor consistent with ductal carcinoma.  Normal lymph node identified.  ER  negative, MS negative, HER2 IHC 0, Ki-67 85.3%.    23 Right Breast radical mastectomy- Right breast found to have no residual invasive or in situ carcinoma. Benign breast Parenchyma with changes consistent with previous biopsy, fibrosis and ductal hyperplasia without atypia. All margins negative for invasive and in situ carcinoma. 3 lymph nodes excised and found to be negative for metastatic carcinoma.     Imagin2024 CT chest PE protocol:  No PE.  Heterogeneous ground-glass and consolidative opacities in the anterior right lung in the upper middle and lower lobes.  This may reflect posttreatment change but recommend clinical correlation pneumonia.  Neoplastic disease also can not be excluded.  Comparison with prior exam is recommended if available.    24 Abdominal US: Unremarkable findings  24 CT Chest w/ contrast: findings consistent with post radiation changes in the right upper lobe, however, there are patchy areas of groundglass opacity in this region, which are nonspecific and may need clinical findings to exclude acute infiltrate/pneumonia. Otherwise no evidence of new malignancy or metastatic disease.  2023 CT abdomen pelvis with contrast:  No evidence of neoplastic disease, small umbilical hernia.    2022 echo EF 60%     01/10/2023 PET-CT:  Hypermetabolic mass in the superior right breast in keeping with the known cancer popliteal additional areas of mild-to-moderate uptake in the superior and retroareolar right breast are understood with definite discrete mass on CT but worrisome for infiltrative breast cancer.  Multiple hypermetabolic right axillary subpectoral and right internal mammary lymph nodes consistent with metastatic disease.  No other suspicious uptake.  Right breast mass measures 3 by 2.8 cm with an SUV of 13.7.  Inferior to the mass there was mild diffuse uptake associated with non-mass like dense breast tissue with an SUV of 4.0.  There is also  abnormal uptake in the retroareolar right breast with definitive discrete mass can not be  from the dense glandular tissue with an SUV of 5.7 and measuring 1.5 x 1.1 cm.  These are concerning for infiltrative malignancies.    Laboratory   09/08/24: cr 1.31, LFTs WNL, TSH 0.486, cortisol 5.8, wbc 7.00,. hgb 11.1, plt 228  06/19/2024 CMP unremarkable, TSH 1.1323, cortisol 15.7, WBC count 7.63, hemoglobin 11.9, MCV 92.1, platelet count 276, ANC 5.18.    06/04/2024 CMP unremarkable, TSH 0.8, cortisol 14.5, WBC count 9.4, hemoglobin 12.3, MCV 92.1, platelet count 2 1 3, ANC 7.1.  05/14/24: CMP WNL, TSH 0.88, cortisol 5.5, wbc 6.99, hgb 11.3, plt 238, ANC 4.84  4/21/24 cr 0.88, TSH 0.6571, WBC 10.61, HGB 11.3, , ANC 8.19  3/30/2024:  Creatinine 0.89, liver enzymes WNL, T. Bili WNL, WBC 6.19, Hgb 11.6, Hct 34.8, Plt 225,000, TSH 0.99, cortisol 14.5.  03/10/24: CMP WNL, TSH 0.58, cortisol 13.3, wbc 7.25, hgb 11.0, plt 208, ANC 5.06  02/15/24 creatinine 0.74, albumin 2.6, LFTs unremarkable, WBC count 7.7, hemoglobin 14.3, .6, platelet count 380, ANC 6.5.    1/28/24: CMP and CBC WNL TSH 1.6687, Cortisol 9.6  01/07/2024: CMP WNL, TSH 0.8660, cortisol 7.7, wbc 5.20, hgb 11.7, plt 197, ANC 2.89  12/17/23: CMP unremarkable, TSH 0.514, cortisol 11.9, WBC 5.89, Hb 12.1, , ANC 3.59    12/04/2023 CMP unremarkable, WBC count 3.46, hemoglobin 11.6, platelet count 2 1 1.  TSH 1.9.  11/13/23: cr 0.78, alb 3.7, ca 9.3, LFTs WNL, TSH 1.5, wbc 8, hgb 11.1, plt 227, ANC 6.08  10/09/23: cr 0.82, alb 3.2, ca 8.9, LFTs WNL, TSH 1.5, wbc 6.93, hgb 9.8, plt 258, ANC 3.10  07/25/23: CMP WNL, wbc 3.83, hgb 9.4, plt 297, ANC 1.55, mag 1.8, phosphorus 4.2  07/04/2023: CMP unremarkable, TSH 0.50, cortisol 8.6, wbc 5.83, hgb 9.7, plt 307, ANC 3.57  06/12/2023:  Creatinine 0.87, albumin 3.7, LFTs within normal limits, calcium 9.5, magnesium 1.8, phosphorus 4.2, TSH 0.33, WBC count 3.9, hemoglobin 9.6, MCV 95, platelet count  327, ANC 1.87.   05/22/23 cr 0.81, alb 3.8, ca 9.6, LFTs WNL, mag 1.8, phosphorus 3.8, wbc 6.99, hgb 9.6, plt 297, ANC 3.95  05/01/23: Cr 0.85, alb 3.8, ca 9.3, AST 45, ALT 70, mag 1.7, phosphorus 3.6, wbc 4.85, hgb 10.2, plt 197, ANC 2.20  04/24/2023:  Creatinine 0.8, albumin 3.6, alkaline phosphatase 77, total bili 0.4, AST 39, ALT 87, magnesium 1.7, phosphorus 3.3, WBC count 9.6, ANC 4.9, platelet count 196, hemoglobin 9.6.  3/23/23: cr 0.79 , Mg 1.7 TSH 1.8  AST 32  wbc 7.92 hgb 10.0 Plt 215  03/15/23: cr 0.81, TB 0.7, mag 2.0, TSH 0.7533, cortisol 17.9, , , wbc 4.62, hgb 11.1, plt 238, ANC 1.89, UPT neg  03/08/2023:  Creatinine 0.83, AST 73, , bilirubin 0.5, TSH 0.75, WBC 5.7, hemoglobin 11, platelets 222  03/02/2023:  WBC count 5.17, hemoglobin 10.2, MCV 91.1, platelet count 265, ANC 1.89, urine pregnancy negative, creatinine 0.9, albu3.5, ca 9.2, alkaline phosphatase 76, total bilirubin 0.3, AST 53, , magnesium 1.8, phosphorus 2.7, TSH 0.9.  02/22/2023: WBC 3.02, Hgb 10.9, MCV 88.0, , ANC 1.4 3, UPT negative, creatinine 0.82 albumin 3.7 magnesium 2.1 phosphorus 2.1, TSH 0.6540, cortisol 8.7  02/15/23 Cr 0.83, alb 3.7, LFTs WNL, mag 1.7, phosphorus 2.0, TSH 0.4434, cortisol 10.6, wbc 3.32, hgb 11.3, plt 266, ANC 1.42 UPT negative   02/08/23: cr 0.79, alb 3.5, ca 9.3, AST 36, ALT 45, mag 1.9, phosphorus 2.1, wbc 4.47, hgb 11.1, plt 264, ANC 2.28  02/01/2023:  Creatinine 0.83, albumin 3.7, LFTs within normal limits, TSH 1.09, cortisol 8.8, free T4 0.98, WBC 7.7, Hb 11.5, MCV 87.9, platelet count 283, ANC 4.09, urine pregnancy negative.    Past Medical History:   Diagnosis Date    Breast cancer     GERD (gastroesophageal reflux disease)      Past Surgical History:   Procedure Laterality Date    ABLATION      MEDIPORT INSERTION, DOUBLE Left 01/26/2023    TUBAL LIGATION         Family History   Problem Relation Name Age of Onset    Diabetes Maternal Grandmother Savanna Cordoba  "    Lung cancer Maternal Grandfather [facility]     Liver cancer Maternal Uncle Edgardo Chancellor        Social History     Socioeconomic History    Marital status: Single   Tobacco Use    Smoking status: Never    Smokeless tobacco: Never   Substance and Sexual Activity    Alcohol use: Yes     Comment: occasionally    Drug use: Never    Sexual activity: Yes     Partners: Male       Current Outpatient Medications   Medication Sig Dispense Refill    boric acid (PH-D) 600 mg vaginal suppository INSERT 1 SUPPOSITORY VAGINALLY DAILY AS NEEDED      hydrOXYzine pamoate (VISTARIL) 25 MG Cap Take 1 capsule (25 mg total) by mouth every 8 (eight) hours as needed (itching). (Patient not taking: Reported on 6/19/2024) 30 capsule 1    LINZESS 290 mcg Cap capsule Take 290 mcg by mouth.      OLANZapine (ZYPREXA) 10 MG tablet Take by mouth.      predniSONE (DELTASONE) 20 MG tablet Take 20 mg by mouth.      promethazine (PHENERGAN) 12.5 MG Tab Take 1 tablet (12.5 mg total) by mouth every 4 (four) hours as needed. 60 tablet 1    promethazine-dextromethorphan (PROMETHAZINE-DM) 6.25-15 mg/5 mL Syrp Take by mouth. (Patient not taking: Reported on 6/19/2024)      traMADoL (ULTRAM) 50 mg tablet Take 50 mg by mouth every 4 (four) hours.       No current facility-administered medications for this visit.     Review of patient's allergies indicates:  No Known Allergies         Physical Exam:   Blood pressure 96/67, pulse 74, temperature 97.9 °F (36.6 °C), temperature source Oral, resp. rate 18, height 5' 6" (1.676 m), weight 90.1 kg (198 lb 11.2 oz), SpO2 100%.     ECOG PS 0  GA: AAOx3, NAD  HEENT: NCAT, no tenderness to head with palpation. good dentition, moist oral mucous membranes.  LYMPH: no cervical, axillary or supraclavicular adenopathy  CVS: s1s2 RRR, no M/R/G  RESP: CTA b/l, no crackles, no wheezes or rhonchi  BREAST:  Right breast with well-healed incision without nodularity, new lumps or bumps or skin changes.  Right axilla without " evidence of adenopathy.  ABD: soft, NT, ND, BS+, no hepatosplenomegaly  EXT: no deformities, no pedal edema  SKIN: no rashes, warm and dry  NEURO: normal mentation, strength 5/5 on all 4 extremities, no sensory deficits      Assessment:       # Right breast invasive ductal carcinoma, triple negative  ER negative, ME negative, HER2 IHC 0, Ki-67 85%   [id number], stage IIIC   Discussed with patient the diagnosis of triple negative breast cancer, treatment recommendations including neoadjuvant chemotherapy followed by surgery followed by adjuvant chemotherapy + radiation therapy   Reviewed PET-CT without evidence of distant metastatic disease.  Will plan for treatment with neoadjuvant carbo Taxol pembrolizumab followed by AC pembrolizumab prior to surgical resection   S/p MRI bilateral breasts   Patient is not interested in fertility preservation  She is status post tubal ligation, discuss the need for dual contraception while on chemotherapy.  Baseline Echo 60%  S/p port placement and chemo ed  Status post neoadjuvant chemotherapy and immunotherapy  Genetic counseling done, negative.  S/P Bilateral Mastectomy, with complete pathological response  Status post adjuvant radiation therapy on 11/29/2023  Initiated adjuvant pembrolizumab on 12/18/2023    Plan  Will plan to follow-up q 3 -4 months for next 2 years. No labs needed  Recommend OTC natural sleep aid for recent insomnia most likely related to surgical menopause.

## 2024-11-13 NOTE — DISCHARGE INSTRUCTIONS
Breast Reconstruction Discharge Instructions     What care is needed at home?   Sleep with your head and chest raised on 2 to 3 pillows to lower swelling.  Your doctor may suggest you use an ice pack over the painful part. Never put ice right on the skin. Do not leave the ice on more than 10 to 15 minutes at a time.  Cough and take deep breaths to help keep your lungs clear.  Be sure to wash your hands before and after touching your wound or dressing.   Leave your dressing in place. Do not remove the dressing. Keep clean, dry, and intact at all times.  Sponge bathe only. You may shower when released by your doctor.  Do not lift anything over 10 pounds (4.5 kg)  Ask your doctor when you may go back to your normal activities like work or exercising  Avoid using creams until your skin has healed to lower your risk of infection or told by your doctor.  Wear your supportive/surgical bra at all times except when showering. Put on another open front supportive bra when washing the old one.   Tops that button up the front are easier to put on and take off than those that slip over your head.  Your bowel movements may take some time to get back to normal. Eat small meals high in fiber to avoid hard stools. Drink 6 to 8 glasses of water each day.  Try to walk each day. Start by walking a little more than you did the day before. Walking boosts blood flow and helps prevent lung, belly, and blood problems.  What follow-up care is needed?   Your doctor may ask you to make visits to the office to check on your progress. Be sure to keep your visits.  Any drains placed in your breasts will be removed 2 to 4 days after surgery.  You may have stitches or staples. If so, your doctor will often want to remove the stitches or staples in 1 to 2 weeks.  If you have a tissue expander, your doctor may need to fill the pouch with saline. Your doctor will set the schedule for follow-up.  If you had silicone gel implants, your doctor will check  them every few years using ultrasound or MRI.  Your breasts implants may need to be replaced over time.  A second smaller surgery may need to be done. The surgery would work on fixing the size, shape, and color of your nipple.  Will physical activity be limited?   You will need to limit your activity for a while. Avoid lifting, sports, and sex until your doctor says physical activity is OK. Talk with your doctor about the right amount of activity for you.  What problems could happen?   Bleeding  Infection  Scarring or wrinkled skin over the implant  Implant may make cancer harder to find  Breast sensation may not be the same as your natural breast  Leakage of implants  Problem with breastfeeding  When do I need to call the doctor?   Signs of infection, such as a fever of 100.4°F (38°C) or higher, chills, wound that will not heal.  Signs of wound infection, such as swelling, redness, warmth around the wound; too much pain when touched; yellowish, greenish, or bloody discharge; foul smell coming from the cut site; cut site opens up.  Drain seems clogged and there is fluid under your surgery cuts  Cough, shortness of breath, chest pain  Upset stomach and throwing up that are not helped by nausea drugs  Pain that is not helped by pain drugs.     Patient Education     Chaparro-Goldman Drain      What will the results be?   This drain gets rid of extra fluid from your cut site. This will help it to get better faster.  What happens after the procedure?   You may be sore where the drain was put in. Your doctor will give you drugs for the pain.  You may need to stay in the hospital until you are well enough to go home. Your doctor will watch to see how much fluid is in the bulb each day.  The nurses will empty the drain when it gets about half full or at certain times during the day and measure the amount of fluid emptied.  What care is needed at home?   Wash your hands with soap and water every time before and after you empty  the drain. Do not change your dressing around the ROD drain.  Do not put any lotions, creams, or oils around the ROD drain site.  Empty the drain. Remove the stopper at the end. Pour out the drainage. Your doctor may want you to measure how much drainage is in the bulb. Then, squeeze the bulb to get rid of air and put the stopper back in place.  Care for the tube if there is a clot in it. Squeeze the tube over the clot. You can also squeeze the tube from your skin to the bulb and then let it go. This should open the tube.   Measure the amount of fluid in the drain ball after you empty it each day up to 3 times a day and write it down on a chart.  Look for signs of infection. Your doctor will want to know if you have a fever of 100.4°F (38°C) or higher, chills, if you get very red and sore around the drain, or if the fluid in the drain turns cloudy or smells. Your doctor will want to know if the skin around the drain has any fluid or pus coming out of it.  Sleep on the side opposite to the ROD drain. This prevents any kinking of the tubing or pulling out the suction bulb.  Avoid bumping the drain.  You can hold the drain up with a safety pin while you are moving around.   Ask your doctor about your activity level while you have your drain in place.  What follow-up care is needed?   Be sure to keep your follow up visit.  Your doctor will tell you when the drain may be removed.  What problems could happen?   Fluid leaking from the cut site  Bleeding  Infection  Clot in the tube  Tube and drain falls out  Cut area opens up  Drain stops working  When do I need to call the doctor?   Signs of infection at the drain site. These include swelling, redness, warmth around the wound, too much pain when touched, yellowish or greenish or bloody discharge, foul smell coming from the cut site.  Drain becomes loose, comes apart, abruptly stops draining, or falls out

## 2024-11-18 ENCOUNTER — ANESTHESIA EVENT (OUTPATIENT)
Dept: SURGERY | Facility: HOSPITAL | Age: 39
End: 2024-11-18
Payer: MEDICAID

## 2024-11-18 NOTE — ANESTHESIA PREPROCEDURE EVALUATION
"                                                                                                             11/18/2024  Apral Cordoba is a 39 y.o., female presents today for right breast reconstruction latissimus dorsi flap and right breast tissue expander placement.    Last 3 sets of Vitals        9/16/2024     1:13 PM 11/13/2024     2:30 PM 11/19/2024     6:16 AM   Vitals - 1 value per visit   SYSTOLIC 96  103   DIASTOLIC 67  67   Pulse 74  64   Temp 36.6 °C (97.9 °F)  36.8 °C (98.2 °F)   Resp 18  20   SPO2 100 %  98 %   Weight (lb) 198.7 198    Weight (kg) 90.13 89.812    Height 5' 6" (1.676 m) 5' 6" (1.676 m)    BMI (Calculated) 32.1 32    Pain Score Zero         Pre-op Assessment    I have reviewed the Patient Summary Reports.    I have reviewed the NPO Status.   I have reviewed the Medications.     Review of Systems  Anesthesia Hx:               Denies Personal Hx of Anesthesia complications.                    Social:  Non-Smoker       Hematology/Oncology:       -- Anemia:                                  Cardiovascular:   Functional Capacity good / => 4 METS                         Hepatic/GI:     GERD                Endocrine:        Obesity / BMI > 30      Physical Exam  General: Well nourished, Cooperative, Alert and Oriented    Airway:  Mallampati: I   Mouth Opening: Normal  TM Distance: Normal  Tongue: Normal  Neck ROM: Normal ROM    Dental:  Intact    Chest/Lungs:  Clear to auscultation, Normal Respiratory Rate    Heart:  Rate: Normal  Rhythm: Regular Rhythm        Anesthesia Plan  Type of Anesthesia, risks & benefits discussed:    Anesthesia Type: Gen ETT  Intra-op Monitoring Plan: Standard ASA Monitors  Post Op Pain Control Plan: multimodal analgesia and IV/PO Opioids PRN  Induction:  IV  Airway Plan: Direct  Informed Consent: Informed consent signed with the Patient and all parties understand the risks and agree with anesthesia plan.  All questions answered.   ASA Score: 3  Day of Surgery Review of " History & Physical: H&P Update referred to the surgeon/provider.    Ready For Surgery From Anesthesia Perspective.     .

## 2024-11-19 ENCOUNTER — HOSPITAL ENCOUNTER (OUTPATIENT)
Facility: HOSPITAL | Age: 39
Discharge: HOME | End: 2024-11-21
Attending: SURGERY | Admitting: SURGERY
Payer: MEDICAID

## 2024-11-19 ENCOUNTER — ANESTHESIA (OUTPATIENT)
Dept: SURGERY | Facility: HOSPITAL | Age: 39
End: 2024-11-19
Payer: MEDICAID

## 2024-11-19 DIAGNOSIS — Z85.3 PERSONAL HISTORY OF MALIGNANT NEOPLASM OF BREAST: ICD-10-CM

## 2024-11-19 DIAGNOSIS — Z42.1 ENCOUNTER FOR BREAST RECONSTRUCTION FOLLOWING MASTECTOMY: ICD-10-CM

## 2024-11-19 LAB
ABORH RETYPE: NORMAL
GROUP & RH: NORMAL
INDIRECT COOMBS: NORMAL
SPECIMEN OUTDATE: NORMAL

## 2024-11-19 PROCEDURE — 25000003 PHARM REV CODE 250: Performed by: SURGERY

## 2024-11-19 PROCEDURE — 15860 IV NJX TST VASC FLO FLAP/GRF: CPT | Mod: ,,, | Performed by: SURGERY

## 2024-11-19 PROCEDURE — 25000003 PHARM REV CODE 250

## 2024-11-19 PROCEDURE — 63600175 PHARM REV CODE 636 W HCPCS: Performed by: SURGERY

## 2024-11-19 PROCEDURE — 63600175 PHARM REV CODE 636 W HCPCS: Performed by: ANESTHESIOLOGY

## 2024-11-19 PROCEDURE — 88305 TISSUE EXAM BY PATHOLOGIST: CPT | Performed by: SURGERY

## 2024-11-19 PROCEDURE — C9290 INJ, BUPIVACAINE LIPOSOME: HCPCS | Performed by: SURGERY

## 2024-11-19 PROCEDURE — 36000707: Performed by: SURGERY

## 2024-11-19 PROCEDURE — 86901 BLOOD TYPING SEROLOGIC RH(D): CPT | Performed by: ANESTHESIOLOGY

## 2024-11-19 PROCEDURE — 11047 DBRDMT BONE EACH ADDL: CPT

## 2024-11-19 PROCEDURE — 19361 BRST RCNSTJ LATSMS DRSI FLAP: CPT | Mod: 51,RT,, | Performed by: SURGERY

## 2024-11-19 PROCEDURE — 15777 ACELLULAR DERM MATRIX IMPLT: CPT | Mod: 50,,, | Performed by: SURGERY

## 2024-11-19 PROCEDURE — 37000008 HC ANESTHESIA 1ST 15 MINUTES: Performed by: SURGERY

## 2024-11-19 PROCEDURE — C1729 CATH, DRAINAGE: HCPCS | Performed by: SURGERY

## 2024-11-19 PROCEDURE — 71000015 HC POSTOP RECOV 1ST HR: Performed by: SURGERY

## 2024-11-19 PROCEDURE — 25000003 PHARM REV CODE 250: Performed by: ANESTHESIOLOGY

## 2024-11-19 PROCEDURE — 71000016 HC POSTOP RECOV ADDL HR: Performed by: SURGERY

## 2024-11-19 PROCEDURE — 71000033 HC RECOVERY, INTIAL HOUR: Performed by: SURGERY

## 2024-11-19 PROCEDURE — 19357 TISS XPNDR PLMT BRST RCNSTJ: CPT | Mod: 50,,, | Performed by: SURGERY

## 2024-11-19 PROCEDURE — 37000009 HC ANESTHESIA EA ADD 15 MINS: Performed by: SURGERY

## 2024-11-19 PROCEDURE — 27201423 OPTIME MED/SURG SUP & DEVICES STERILE SUPPLY: Performed by: SURGERY

## 2024-11-19 PROCEDURE — C1789 PROSTHESIS, BREAST, IMP: HCPCS | Performed by: SURGERY

## 2024-11-19 PROCEDURE — 63600175 PHARM REV CODE 636 W HCPCS

## 2024-11-19 PROCEDURE — 36000706: Performed by: SURGERY

## 2024-11-19 DEVICE — EXPANDER NATRELLE FH EP 450CC: Type: IMPLANTABLE DEVICE | Site: BREAST | Status: FUNCTIONAL

## 2024-11-19 DEVICE — TISSUE ALLODERM SELECT 1.2-2.0: Type: IMPLANTABLE DEVICE | Site: BREAST | Status: FUNCTIONAL

## 2024-11-19 RX ORDER — MIDAZOLAM HYDROCHLORIDE 2 MG/2ML
INJECTION, SOLUTION INTRAMUSCULAR; INTRAVENOUS
Status: COMPLETED
Start: 2024-11-19 | End: 2024-11-19

## 2024-11-19 RX ORDER — CELECOXIB 200 MG/1
200 CAPSULE ORAL ONCE
Status: COMPLETED | OUTPATIENT
Start: 2024-11-19 | End: 2024-11-19

## 2024-11-19 RX ORDER — HEPARIN SODIUM 5000 [USP'U]/ML
5000 INJECTION, SOLUTION INTRAVENOUS; SUBCUTANEOUS ONCE
Status: COMPLETED | OUTPATIENT
Start: 2024-11-19 | End: 2024-11-19

## 2024-11-19 RX ORDER — MUPIROCIN 20 MG/G
OINTMENT TOPICAL 2 TIMES DAILY
Status: DISCONTINUED | OUTPATIENT
Start: 2024-11-19 | End: 2024-11-21 | Stop reason: HOSPADM

## 2024-11-19 RX ORDER — CEFAZOLIN SODIUM 1 G/3ML
INJECTION, POWDER, FOR SOLUTION INTRAMUSCULAR; INTRAVENOUS
Status: DISPENSED
Start: 2024-11-19 | End: 2024-11-19

## 2024-11-19 RX ORDER — PROPOFOL 10 MG/ML
VIAL (ML) INTRAVENOUS
Status: DISCONTINUED | OUTPATIENT
Start: 2024-11-19 | End: 2024-11-19

## 2024-11-19 RX ORDER — SCOLOPAMINE TRANSDERMAL SYSTEM 1 MG/1
1 PATCH, EXTENDED RELEASE TRANSDERMAL ONCE
Status: DISCONTINUED | OUTPATIENT
Start: 2024-11-19 | End: 2024-11-21 | Stop reason: HOSPADM

## 2024-11-19 RX ORDER — SODIUM CHLORIDE, SODIUM GLUCONATE, SODIUM ACETATE, POTASSIUM CHLORIDE AND MAGNESIUM CHLORIDE 30; 37; 368; 526; 502 MG/100ML; MG/100ML; MG/100ML; MG/100ML; MG/100ML
INJECTION, SOLUTION INTRAVENOUS CONTINUOUS
Status: DISCONTINUED | OUTPATIENT
Start: 2024-11-19 | End: 2024-11-21 | Stop reason: HOSPADM

## 2024-11-19 RX ORDER — FENTANYL CITRATE 50 UG/ML
INJECTION, SOLUTION INTRAMUSCULAR; INTRAVENOUS
Status: DISCONTINUED | OUTPATIENT
Start: 2024-11-19 | End: 2024-11-19

## 2024-11-19 RX ORDER — LIDOCAINE HYDROCHLORIDE 10 MG/ML
INJECTION, SOLUTION EPIDURAL; INFILTRATION; INTRACAUDAL; PERINEURAL
Status: DISCONTINUED | OUTPATIENT
Start: 2024-11-19 | End: 2024-11-19

## 2024-11-19 RX ORDER — MEPERIDINE HYDROCHLORIDE 25 MG/ML
12.5 INJECTION INTRAMUSCULAR; INTRAVENOUS; SUBCUTANEOUS EVERY 10 MIN PRN
Status: DISCONTINUED | OUTPATIENT
Start: 2024-11-19 | End: 2024-11-19 | Stop reason: HOSPADM

## 2024-11-19 RX ORDER — METHYLENE BLUE 5 MG/ML
INJECTION INTRAVENOUS
Status: DISPENSED
Start: 2024-11-19 | End: 2024-11-19

## 2024-11-19 RX ORDER — DEXMEDETOMIDINE HYDROCHLORIDE 100 UG/ML
INJECTION, SOLUTION INTRAVENOUS
Status: DISCONTINUED | OUTPATIENT
Start: 2024-11-19 | End: 2024-11-19

## 2024-11-19 RX ORDER — CEFAZOLIN SODIUM 2 G/50ML
2 SOLUTION INTRAVENOUS
Status: COMPLETED | OUTPATIENT
Start: 2024-11-19 | End: 2024-11-19

## 2024-11-19 RX ORDER — GLYCOPYRROLATE 0.2 MG/ML
INJECTION INTRAMUSCULAR; INTRAVENOUS
Status: DISCONTINUED | OUTPATIENT
Start: 2024-11-19 | End: 2024-11-19

## 2024-11-19 RX ORDER — HYDROMORPHONE HYDROCHLORIDE 2 MG/ML
0.4 INJECTION, SOLUTION INTRAMUSCULAR; INTRAVENOUS; SUBCUTANEOUS EVERY 5 MIN PRN
Status: DISCONTINUED | OUTPATIENT
Start: 2024-11-19 | End: 2024-11-19 | Stop reason: HOSPADM

## 2024-11-19 RX ORDER — HYDROMORPHONE HYDROCHLORIDE 2 MG/ML
INJECTION, SOLUTION INTRAMUSCULAR; INTRAVENOUS; SUBCUTANEOUS
Status: DISCONTINUED | OUTPATIENT
Start: 2024-11-19 | End: 2024-11-19

## 2024-11-19 RX ORDER — INDOCYANINE GREEN AND WATER 25 MG
KIT INJECTION
Status: DISCONTINUED | OUTPATIENT
Start: 2024-11-19 | End: 2024-11-19

## 2024-11-19 RX ORDER — GLUCAGON 1 MG
1 KIT INJECTION
Status: DISCONTINUED | OUTPATIENT
Start: 2024-11-19 | End: 2024-11-19 | Stop reason: HOSPADM

## 2024-11-19 RX ORDER — METHYLENE BLUE 5 MG/ML
INJECTION INTRAVENOUS
Status: DISCONTINUED | OUTPATIENT
Start: 2024-11-19 | End: 2024-11-19 | Stop reason: HOSPADM

## 2024-11-19 RX ORDER — BUPIVACAINE 13.3 MG/ML
INJECTION, SUSPENSION, LIPOSOMAL INFILTRATION
Status: DISCONTINUED | OUTPATIENT
Start: 2024-11-19 | End: 2024-11-19 | Stop reason: HOSPADM

## 2024-11-19 RX ORDER — DEXAMETHASONE SODIUM PHOSPHATE 4 MG/ML
INJECTION, SOLUTION INTRA-ARTICULAR; INTRALESIONAL; INTRAMUSCULAR; INTRAVENOUS; SOFT TISSUE
Status: DISCONTINUED | OUTPATIENT
Start: 2024-11-19 | End: 2024-11-19

## 2024-11-19 RX ORDER — HYDROMORPHONE HYDROCHLORIDE 2 MG/ML
0.2 INJECTION, SOLUTION INTRAMUSCULAR; INTRAVENOUS; SUBCUTANEOUS EVERY 5 MIN PRN
Status: DISCONTINUED | OUTPATIENT
Start: 2024-11-19 | End: 2024-11-19 | Stop reason: HOSPADM

## 2024-11-19 RX ORDER — MIDAZOLAM HYDROCHLORIDE 2 MG/2ML
2 INJECTION, SOLUTION INTRAMUSCULAR; INTRAVENOUS ONCE AS NEEDED
Status: COMPLETED | OUTPATIENT
Start: 2024-11-19 | End: 2024-11-19

## 2024-11-19 RX ORDER — SODIUM CHLORIDE 9 MG/ML
INJECTION, SOLUTION INTRAVENOUS CONTINUOUS
Status: DISCONTINUED | OUTPATIENT
Start: 2024-11-19 | End: 2024-11-21 | Stop reason: HOSPADM

## 2024-11-19 RX ORDER — ONDANSETRON 4 MG/1
8 TABLET, ORALLY DISINTEGRATING ORAL EVERY 6 HOURS PRN
Status: DISCONTINUED | OUTPATIENT
Start: 2024-11-19 | End: 2024-11-19 | Stop reason: HOSPADM

## 2024-11-19 RX ORDER — CEFAZOLIN SODIUM 1 G/3ML
2 INJECTION, POWDER, FOR SOLUTION INTRAMUSCULAR; INTRAVENOUS
Status: COMPLETED | OUTPATIENT
Start: 2024-11-19 | End: 2024-11-19

## 2024-11-19 RX ORDER — GENTAMICIN 40 MG/ML
INJECTION, SOLUTION INTRAMUSCULAR; INTRAVENOUS
Status: DISPENSED
Start: 2024-11-19 | End: 2024-11-19

## 2024-11-19 RX ORDER — ROCURONIUM BROMIDE 10 MG/ML
INJECTION, SOLUTION INTRAVENOUS
Status: DISCONTINUED | OUTPATIENT
Start: 2024-11-19 | End: 2024-11-19

## 2024-11-19 RX ORDER — ONDANSETRON HYDROCHLORIDE 2 MG/ML
4 INJECTION, SOLUTION INTRAVENOUS EVERY 12 HOURS PRN
Status: DISCONTINUED | OUTPATIENT
Start: 2024-11-19 | End: 2024-11-21 | Stop reason: HOSPADM

## 2024-11-19 RX ORDER — LIDOCAINE HYDROCHLORIDE 10 MG/ML
1 INJECTION, SOLUTION EPIDURAL; INFILTRATION; INTRACAUDAL; PERINEURAL ONCE
Status: DISCONTINUED | OUTPATIENT
Start: 2024-11-19 | End: 2024-11-19 | Stop reason: HOSPADM

## 2024-11-19 RX ORDER — CYCLOBENZAPRINE HCL 10 MG
10 TABLET ORAL 3 TIMES DAILY PRN
Status: DISCONTINUED | OUTPATIENT
Start: 2024-11-19 | End: 2024-11-21 | Stop reason: HOSPADM

## 2024-11-19 RX ORDER — BUPIVACAINE 13.3 MG/ML
INJECTION, SUSPENSION, LIPOSOMAL INFILTRATION
Status: DISPENSED
Start: 2024-11-19 | End: 2024-11-19

## 2024-11-19 RX ORDER — IPRATROPIUM BROMIDE AND ALBUTEROL SULFATE 2.5; .5 MG/3ML; MG/3ML
3 SOLUTION RESPIRATORY (INHALATION)
Status: DISCONTINUED | OUTPATIENT
Start: 2024-11-19 | End: 2024-11-19 | Stop reason: HOSPADM

## 2024-11-19 RX ORDER — METHOCARBAMOL 100 MG/ML
1000 INJECTION, SOLUTION INTRAMUSCULAR; INTRAVENOUS ONCE AS NEEDED
Status: COMPLETED | OUTPATIENT
Start: 2024-11-19 | End: 2024-11-19

## 2024-11-19 RX ORDER — GENTAMICIN 40 MG/ML
INJECTION, SOLUTION INTRAMUSCULAR; INTRAVENOUS
Status: DISCONTINUED | OUTPATIENT
Start: 2024-11-19 | End: 2024-11-19 | Stop reason: HOSPADM

## 2024-11-19 RX ORDER — PROCHLORPERAZINE EDISYLATE 5 MG/ML
5 INJECTION INTRAMUSCULAR; INTRAVENOUS EVERY 6 HOURS PRN
Status: DISCONTINUED | OUTPATIENT
Start: 2024-11-19 | End: 2024-11-21 | Stop reason: HOSPADM

## 2024-11-19 RX ORDER — ONDANSETRON HYDROCHLORIDE 2 MG/ML
INJECTION, SOLUTION INTRAMUSCULAR; INTRAVENOUS
Status: DISCONTINUED | OUTPATIENT
Start: 2024-11-19 | End: 2024-11-19

## 2024-11-19 RX ORDER — MORPHINE SULFATE 4 MG/ML
2 INJECTION, SOLUTION INTRAMUSCULAR; INTRAVENOUS EVERY 4 HOURS PRN
Status: DISCONTINUED | OUTPATIENT
Start: 2024-11-19 | End: 2024-11-21 | Stop reason: HOSPADM

## 2024-11-19 RX ORDER — PHENYLEPHRINE HYDROCHLORIDE 10 MG/ML
INJECTION INTRAVENOUS
Status: DISCONTINUED | OUTPATIENT
Start: 2024-11-19 | End: 2024-11-19

## 2024-11-19 RX ORDER — OXYCODONE AND ACETAMINOPHEN 5; 325 MG/1; MG/1
2 TABLET ORAL EVERY 4 HOURS PRN
Status: DISCONTINUED | OUTPATIENT
Start: 2024-11-19 | End: 2024-11-21 | Stop reason: HOSPADM

## 2024-11-19 RX ORDER — CEFAZOLIN SODIUM 1 G/3ML
INJECTION, POWDER, FOR SOLUTION INTRAMUSCULAR; INTRAVENOUS
Status: DISCONTINUED | OUTPATIENT
Start: 2024-11-19 | End: 2024-11-19 | Stop reason: HOSPADM

## 2024-11-19 RX ORDER — ACETAMINOPHEN 10 MG/ML
1000 INJECTION, SOLUTION INTRAVENOUS ONCE
Status: COMPLETED | OUTPATIENT
Start: 2024-11-19 | End: 2024-11-19

## 2024-11-19 RX ORDER — PROCHLORPERAZINE EDISYLATE 5 MG/ML
5 INJECTION INTRAMUSCULAR; INTRAVENOUS EVERY 30 MIN PRN
Status: DISCONTINUED | OUTPATIENT
Start: 2024-11-19 | End: 2024-11-19 | Stop reason: HOSPADM

## 2024-11-19 RX ORDER — ONDANSETRON HYDROCHLORIDE 2 MG/ML
4 INJECTION, SOLUTION INTRAVENOUS DAILY PRN
Status: DISCONTINUED | OUTPATIENT
Start: 2024-11-19 | End: 2024-11-19 | Stop reason: HOSPADM

## 2024-11-19 RX ADMIN — PHENYLEPHRINE HYDROCHLORIDE 50 MCG: 10 INJECTION INTRAVENOUS at 09:11

## 2024-11-19 RX ADMIN — PHENYLEPHRINE HYDROCHLORIDE 50 MCG: 10 INJECTION INTRAVENOUS at 10:11

## 2024-11-19 RX ADMIN — CEFAZOLIN 2 G: 330 INJECTION, POWDER, FOR SOLUTION INTRAMUSCULAR; INTRAVENOUS at 06:11

## 2024-11-19 RX ADMIN — METHOCARBAMOL 1000 MG: 100 INJECTION INTRAMUSCULAR; INTRAVENOUS at 11:11

## 2024-11-19 RX ADMIN — DEXMEDETOMIDINE 4 MCG: 200 INJECTION, SOLUTION INTRAVENOUS at 08:11

## 2024-11-19 RX ADMIN — SUGAMMADEX 200 MG: 100 INJECTION, SOLUTION INTRAVENOUS at 10:11

## 2024-11-19 RX ADMIN — DEXMEDETOMIDINE 4 MCG: 200 INJECTION, SOLUTION INTRAVENOUS at 07:11

## 2024-11-19 RX ADMIN — CEFAZOLIN SODIUM 2 G: 2 SOLUTION INTRAVENOUS at 08:11

## 2024-11-19 RX ADMIN — DEXMEDETOMIDINE 4 MCG: 200 INJECTION, SOLUTION INTRAVENOUS at 09:11

## 2024-11-19 RX ADMIN — CEFAZOLIN 1 G: 330 INJECTION, POWDER, FOR SOLUTION INTRAMUSCULAR; INTRAVENOUS at 10:11

## 2024-11-19 RX ADMIN — HYDROMORPHONE HYDROCHLORIDE 0.2 MG: 2 INJECTION, SOLUTION INTRAMUSCULAR; INTRAVENOUS; SUBCUTANEOUS at 07:11

## 2024-11-19 RX ADMIN — GLYCOPYRROLATE 0.2 MG: 0.2 INJECTION INTRAMUSCULAR; INTRAVENOUS at 07:11

## 2024-11-19 RX ADMIN — LIDOCAINE HYDROCHLORIDE 50 MG: 10 INJECTION, SOLUTION EPIDURAL; INFILTRATION; INTRACAUDAL; PERINEURAL at 06:11

## 2024-11-19 RX ADMIN — OXYCODONE HYDROCHLORIDE AND ACETAMINOPHEN 2 TABLET: 5; 325 TABLET ORAL at 05:11

## 2024-11-19 RX ADMIN — ONDANSETRON 4 MG: 2 INJECTION INTRAMUSCULAR; INTRAVENOUS at 02:11

## 2024-11-19 RX ADMIN — ONDANSETRON 4 MG: 2 INJECTION INTRAMUSCULAR; INTRAVENOUS at 07:11

## 2024-11-19 RX ADMIN — MIDAZOLAM HYDROCHLORIDE 2 MG: 2 INJECTION, SOLUTION INTRAMUSCULAR; INTRAVENOUS at 06:11

## 2024-11-19 RX ADMIN — PHENYLEPHRINE HYDROCHLORIDE 100 MCG: 10 INJECTION INTRAVENOUS at 10:11

## 2024-11-19 RX ADMIN — SODIUM CHLORIDE: 9 INJECTION, SOLUTION INTRAVENOUS at 01:11

## 2024-11-19 RX ADMIN — DEXMEDETOMIDINE 4 MCG: 200 INJECTION, SOLUTION INTRAVENOUS at 11:11

## 2024-11-19 RX ADMIN — CEFAZOLIN SODIUM 2 G: 2 SOLUTION INTRAVENOUS at 01:11

## 2024-11-19 RX ADMIN — ROCURONIUM BROMIDE 50 MG: 10 INJECTION, SOLUTION INTRAVENOUS at 06:11

## 2024-11-19 RX ADMIN — HYDROMORPHONE HYDROCHLORIDE 0.2 MG: 2 INJECTION, SOLUTION INTRAMUSCULAR; INTRAVENOUS; SUBCUTANEOUS at 10:11

## 2024-11-19 RX ADMIN — ROCURONIUM BROMIDE 20 MG: 10 INJECTION, SOLUTION INTRAVENOUS at 07:11

## 2024-11-19 RX ADMIN — SODIUM CHLORIDE, POTASSIUM CHLORIDE, SODIUM LACTATE AND CALCIUM CHLORIDE: 600; 310; 30; 20 INJECTION, SOLUTION INTRAVENOUS at 09:11

## 2024-11-19 RX ADMIN — HYDROMORPHONE HYDROCHLORIDE 0.2 MG: 2 INJECTION, SOLUTION INTRAMUSCULAR; INTRAVENOUS; SUBCUTANEOUS at 09:11

## 2024-11-19 RX ADMIN — FENTANYL CITRATE 100 MCG: 50 INJECTION, SOLUTION INTRAMUSCULAR; INTRAVENOUS at 06:11

## 2024-11-19 RX ADMIN — PROPOFOL 200 MG: 10 INJECTION, EMULSION INTRAVENOUS at 06:11

## 2024-11-19 RX ADMIN — DEXAMETHASONE SODIUM PHOSPHATE 8 MG: 4 INJECTION, SOLUTION INTRA-ARTICULAR; INTRALESIONAL; INTRAMUSCULAR; INTRAVENOUS; SOFT TISSUE at 07:11

## 2024-11-19 RX ADMIN — OXYCODONE HYDROCHLORIDE AND ACETAMINOPHEN 2 TABLET: 5; 325 TABLET ORAL at 02:11

## 2024-11-19 RX ADMIN — SODIUM CHLORIDE, POTASSIUM CHLORIDE, SODIUM LACTATE AND CALCIUM CHLORIDE: 600; 310; 30; 20 INJECTION, SOLUTION INTRAVENOUS at 06:11

## 2024-11-19 RX ADMIN — ACETAMINOPHEN 1000 MG: 10 INJECTION, SOLUTION INTRAVENOUS at 06:11

## 2024-11-19 RX ADMIN — INDOCYANINE GREEN AND WATER 8.3 MG: KIT at 09:11

## 2024-11-19 RX ADMIN — ROCURONIUM BROMIDE 20 MG: 10 INJECTION, SOLUTION INTRAVENOUS at 09:11

## 2024-11-19 RX ADMIN — MIDAZOLAM HYDROCHLORIDE 2 MG: 1 INJECTION, SOLUTION INTRAMUSCULAR; INTRAVENOUS at 06:11

## 2024-11-19 RX ADMIN — CELECOXIB 200 MG: 200 CAPSULE ORAL at 06:11

## 2024-11-19 RX ADMIN — HEPARIN SODIUM 5000 UNITS: 5000 INJECTION, SOLUTION INTRAVENOUS; SUBCUTANEOUS at 06:11

## 2024-11-19 RX ADMIN — SCOPOLAMINE 1 PATCH: 1 PATCH TRANSDERMAL at 06:11

## 2024-11-19 RX ADMIN — PHENYLEPHRINE HYDROCHLORIDE 200 MCG: 10 INJECTION INTRAVENOUS at 08:11

## 2024-11-19 RX ADMIN — DEXMEDETOMIDINE 4 MCG: 200 INJECTION, SOLUTION INTRAVENOUS at 10:11

## 2024-11-19 RX ADMIN — ROCURONIUM BROMIDE 10 MG: 10 INJECTION, SOLUTION INTRAVENOUS at 07:11

## 2024-11-19 RX ADMIN — PROCHLORPERAZINE EDISYLATE 5 MG: 5 INJECTION INTRAMUSCULAR; INTRAVENOUS at 07:11

## 2024-11-19 RX ADMIN — INDOCYANINE GREEN AND WATER 8.3 MG: KIT at 10:11

## 2024-11-19 NOTE — ANESTHESIA POSTPROCEDURE EVALUATION
Anesthesia Post Evaluation    Patient: Apral Cordoba    Procedure(s) Performed: Procedure(s) (LRB):  RECONSTRUCTION, BREAST Right breast recon with latissimus dorsi myoccutaneous flap) (Right)  INSERTION, TISSUE EXPANDER, BREAST (right breast placement of tissue expander, left breast recon with tissue expander) (Bilateral)  INSERTION, BIOLOGIC IMPLANT, FOR SOFT TISSUE REINFORCEMENT (bilateral breast placement of acelullar dermal matrix) (Bilateral)  INJECTION, AGENT, INTRAVENOUS, FOR ASSESSMENT OF BLOOD FLOW IN FLAP OR GRAFT (Use of intraoperative fluoroscein angiography) (Bilateral)    Final Anesthesia Type: general      Patient location during evaluation: PACU  Patient participation: No - Unable to Participate, Sedation  Level of consciousness: sedated  Post-procedure vital signs: reviewed and stable  Pain management: adequate  Airway patency: patent  JOSEPH mitigation strategies: Multimodal analgesia, Verification of full reversal of neuromuscular block and Postoperative administration of CPAP, nasopharyngeal airway, or oral appliance in the postanesthesia care unit (PACU)  PONV status at discharge: No PONV  Anesthetic complications: no      Cardiovascular status: blood pressure returned to baseline and stable  Respiratory status: face mask, unassisted and spontaneous ventilation  Hydration status: euvolemic  Follow-up not needed.              Vitals Value Taken Time   /63 11/19/24 1114   Temp 36.7 11/19/24 1118   Pulse 78 11/19/24 1117   Resp 14 11/19/24 1117   SpO2 98 % 11/19/24 1117   Vitals shown include unfiled device data.      No case tracking events are documented in the log.      Pain/Lawanda Score: Pain Rating Prior to Med Admin: 0 (11/19/2024  6:30 AM)

## 2024-11-19 NOTE — ANESTHESIA PROCEDURE NOTES
Intubation    Date/Time: 11/19/2024 6:53 AM    Performed by: Ashly Morales CRNA  Authorized by: Deo Dos Santos Jr., MD    Intubation:     Induction:  Intravenous    Intubated:  Postinduction    Mask Ventilation:  Easy mask    Attempts:  1    Attempted By:  CRNA    Method of Intubation:  Direct    Blade:  Mcallister 2    Laryngeal View Grade: Grade I - full view of cords      Difficult Airway Encountered?: No      Complications:  None    Airway Device:  Oral endotracheal tube    Airway Device Size:  7.0    Style/Cuff Inflation:  Cuffed (inflated to minimal occlusive pressure)    Inflation Amount (mL):  6    Tube secured:  21    Secured at:  The lips    Placement Verified By:  Capnometry    Complicating Factors:  None    Findings Post-Intubation:  BS equal bilateral

## 2024-11-19 NOTE — CARE UPDATE
Patient awakened,swann,rejected oral airway, oriented/reassured her, she denied c/o, exchanging well.

## 2024-11-19 NOTE — PLAN OF CARE
Problem: Pain Acute  Goal: Optimal Pain Control and Function  Outcome: Progressing     Problem: Fall Injury Risk  Goal: Absence of Fall and Fall-Related Injury  Outcome: Progressing     Problem: Surgical Site Infection  Goal: Absence of Infection Signs and Symptoms  Outcome: Progressing     Problem: Adult Inpatient Plan of Care  Goal: Plan of Care Review  Outcome: Progressing  Goal: Patient-Specific Goal (Individualized)  Outcome: Progressing  Goal: Absence of Hospital-Acquired Illness or Injury  Outcome: Progressing  Goal: Optimal Comfort and Wellbeing  Outcome: Progressing  Goal: Readiness for Transition of Care  Outcome: Progressing     Problem: Wound  Goal: Optimal Coping  Outcome: Progressing  Goal: Optimal Functional Ability  Outcome: Progressing  Goal: Absence of Infection Signs and Symptoms  Outcome: Progressing  Goal: Improved Oral Intake  Outcome: Progressing  Goal: Optimal Pain Control and Function  Outcome: Progressing  Goal: Skin Health and Integrity  Outcome: Progressing  Goal: Optimal Wound Healing  Outcome: Progressing

## 2024-11-20 LAB — PSYCHE PATHOLOGY RESULT: NORMAL

## 2024-11-20 PROCEDURE — 96372 THER/PROPH/DIAG INJ SC/IM: CPT | Performed by: NURSE PRACTITIONER

## 2024-11-20 PROCEDURE — 25000003 PHARM REV CODE 250: Performed by: SURGERY

## 2024-11-20 PROCEDURE — 63600175 PHARM REV CODE 636 W HCPCS: Performed by: NURSE PRACTITIONER

## 2024-11-20 PROCEDURE — G0378 HOSPITAL OBSERVATION PER HR: HCPCS

## 2024-11-20 PROCEDURE — 25000003 PHARM REV CODE 250: Performed by: NURSE PRACTITIONER

## 2024-11-20 RX ORDER — DOCUSATE SODIUM 100 MG/1
100 CAPSULE, LIQUID FILLED ORAL DAILY
Status: DISCONTINUED | OUTPATIENT
Start: 2024-11-20 | End: 2024-11-21 | Stop reason: HOSPADM

## 2024-11-20 RX ORDER — ENOXAPARIN SODIUM 100 MG/ML
30 INJECTION SUBCUTANEOUS EVERY 24 HOURS
Status: DISCONTINUED | OUTPATIENT
Start: 2024-11-20 | End: 2024-11-21

## 2024-11-20 RX ORDER — SODIUM CHLORIDE 9 MG/ML
INJECTION, SOLUTION INTRAVENOUS ONCE
Status: COMPLETED | OUTPATIENT
Start: 2024-11-20 | End: 2024-11-20

## 2024-11-20 RX ORDER — DOCUSATE SODIUM 100 MG/1
100 CAPSULE, LIQUID FILLED ORAL ONCE
Status: COMPLETED | OUTPATIENT
Start: 2024-11-20 | End: 2024-11-20

## 2024-11-20 RX ADMIN — CYCLOBENZAPRINE 10 MG: 10 TABLET, FILM COATED ORAL at 11:11

## 2024-11-20 RX ADMIN — DOCUSATE SODIUM 100 MG: 100 CAPSULE, LIQUID FILLED ORAL at 03:11

## 2024-11-20 RX ADMIN — OXYCODONE HYDROCHLORIDE AND ACETAMINOPHEN 2 TABLET: 5; 325 TABLET ORAL at 05:11

## 2024-11-20 RX ADMIN — OXYCODONE HYDROCHLORIDE AND ACETAMINOPHEN 2 TABLET: 5; 325 TABLET ORAL at 10:11

## 2024-11-20 RX ADMIN — MUPIROCIN: 20 OINTMENT TOPICAL at 07:11

## 2024-11-20 RX ADMIN — ENOXAPARIN SODIUM 30 MG: 30 INJECTION SUBCUTANEOUS at 07:11

## 2024-11-20 RX ADMIN — SODIUM CHLORIDE: 9 INJECTION, SOLUTION INTRAVENOUS at 08:11

## 2024-11-20 RX ADMIN — OXYCODONE HYDROCHLORIDE AND ACETAMINOPHEN 2 TABLET: 5; 325 TABLET ORAL at 03:11

## 2024-11-20 RX ADMIN — CYCLOBENZAPRINE 10 MG: 10 TABLET, FILM COATED ORAL at 07:11

## 2024-11-20 RX ADMIN — DOCUSATE SODIUM 100 MG: 100 CAPSULE, LIQUID FILLED ORAL at 07:11

## 2024-11-20 NOTE — PROGRESS NOTES
Asssited pt up to br where she voided. Bed linens and gown changed, ROD drains checked and emptied. Back to bed safe. She requested nausea meds.

## 2024-11-20 NOTE — OP NOTE
[facility] [address] [place] [address]    PATIENT NAME:      AVELINO BOWLING   YOB: 1985  CSN:               [id number]  MRN:               [id number]  ADMIT DATE:        11/19/2024 05:40:00  PHYSICIAN:         Jorge Gutierrez MD                          OPERATIVE REPORT      DATE OF SURGERY:    11/19/2024 00:00:00    SURGEON:  Jorge Gutierrez MD    PREOPERATIVE DIAGNOSIS:  History of breast cancer and bilateral mastectomy.    POSTOPERATIVE DIAGNOSIS:  History of breast cancer and bilateral mastectomy.    PROCEDURES:    1.  Right breast reconstruction with latissimus dorsi myocutaneous flap.  2. Bilateral breast placement of tissue expanders.  3. Bilateral breast placement of acellular dermal matrix.  4. Use of intraoperative fluorescein angiography    INDICATIONS FOR THE PROCEDURE:  This is a 39-year-old female.  She has a history   of right-sided breast cancer and is status post bilateral mastectomy.  She also   had radiation to the right breast.  She has a history of her previous   abdominoplasty, was not a candidate for abdominal flap based reconstruction.    Options were discussed.  She elected to proceed with the above-listed   procedures.  Risks, benefits, potential complications of the surgery were   discussed in detail.  Signed informed consent.    DESCRIPTION OF PROCEDURE:  Patient was identified in the preoperative holding   area.  She was marked.  She was taken to the operating room.  General   endotracheal anesthesia was provided.  She was placed in the lateral position   with the right side up and prepped in a sterile fashion.  Time-out was taken and   everyone in agreement.  We initially started the procedure by making an ellipse   around her previous right breast mastectomy incision.  She had an area of   radiated skin around the right breast incision that was removed as well.  The    skin and subcutaneous tissues were incised with a 10 blade scalpel and further   removed with electrocautery.  I then created a pocket elevating the skin,   subcutaneous tissue off the pectoralis major muscle, also elevated this down to   my previous markings and then to the IMF, creating a pocket for the placement of   tissue expander and latissimus flap also created a tunnel extending toward   laterally toward the back.  After this was accomplished, I then proceeded to   elevate the latissimus myocutaneous flap on the right side.  The skin paddle was   confirmed.  The skin was incised around the paddle with a 10 blade scalpel.    Further dissection was achieved with electrocautery.  We did maintain a layer of   fatty tissue around the paddle and then dissected down to the latissimus muscle   itself, latissimus muscle was identified and circumferentially isolated and   then elevated maintaining it on its thoracoacromial pedicle.  Once the   latissimus muscle was elevated to the point where I achieved a significant   amount of arc of rotation, I then rotated the flap through the tunnel I had   created previously into the pocket on the anterior chest.  Hemostasis was   ensured.  I placed two 15 round Omar drains within the wound bed, exited   inferolaterally, secured to skin with 2-0 silk suture.  We then also sprayed   Vistaseal throughout the wound bed to help close down the dead space and then   also closed this with 0 Vicryl sutures in the deep dermis, subcutaneous tissue   to subcuticular Stratafix.  Dermabond tape was used as dressing.  We then   temporarily tacked to the anterior breast incision and closed with staples and   covered this with an Ioban and the patient was then replaced in the supine   position and both sides of the chest were prepped in a sterile fashion and she   was completely redraped.  The Ioban was removed.  Staples were removed at the   time of our 2nd portion of procedure.  We  initially started on the patient's   left breast, where I removed an ellipse of skin around her previous mastectomy   scar 1st using a scalpel and electrocautery, elevated the skin, subcutaneous   tissue of the pectoralis major muscle, also down to the IMF creating an   appropriately-sized pocket for placement of the expander.  Then elevated the   pectoralis major muscle, releasing its IMF and lower sternal insertions.    Hemostasis was ensured.  A piece of contour-perforated AlloDerm was then   obtained, trimmed to the appropriate size and secured along the IMF in the   lateral chest wall using 0 Vicryl sutures.  Two 15 round Omar drains were   placed in the wound bed, exited inferolaterally, secured to skin with 2-0 silk   suture.  Exparel was injected throughout the field.  Please note Exparel was   also injected in the latissimus dorsi donor site.  We then re-prepped the   patient, changed our gloves and please note, we irrigated the wound out with   both Betadine and Tran solution and then placed a 450 mL tissue expander   underneath the AlloDerm pectoralis major muscle, placed 200 mL into the expander   using sterile filling system and then approximated the AlloDerm to the   pectoralis major muscle over the top of the expander.  Hemostasis was once again   ensured and everything was closed with 0 Vicryl sutures in the dermis,   subcutaneous tissue to subcuticular Stratafix.  Attention was then paid to the   patient's right breast, similarly elevated the pectoralis major muscle,   releasing its IMF along the sternal insertions, creating a pocket for expander   placement.  I elected to use a piece of AlloDerm to also create a pocket for the   expander as I did not want to risk the expander and later on being displaced   laterally.  This was turned to the appropriate size, secured along the IMF in   the lateral chest wall using 0 Vicryl sutures.  We once again changed our   gloves, re-prepped the patient after  copiously irrigating out the wound.  We   also injected Exparel, placed two 15 round Omar drains superolaterally, secured   to skin with 2-0 silk suture and then placed a 450 mL tissue expander   underneath the AlloDerm and the pectoralis major muscle and placed 200 mL using   sterile filling system into the expander.  0 Vicryl sutures were used to   approximate the pectoralis major muscle to the ADM.  After this was   accomplished, we then used intraoperative fluorescein angiography to evaluate   the perfusion to the latissimus flap, which was noted to be excellent and we   then proceeded to drape the latissimus over the muscle over the complete   inferior pole of the patient's breast covering all of the AlloDerm.  This was   secured to the IMF using 0 Vicryl sutures in interrupted fashion also to the   pectoralis major muscle using 0 Vicryl sutures in interrupted fashion and also   secured down laterally using 0 Vicryl sutures.  We then proceeded to trim the   skin paddle to allow for a very good fit and then once again, ensured hemostasis   and proceeded with closure with 0 Vicryl sutures in deep dermis and 2-0   subcuticular Stratafix.  At the conclusion of the procedure, we once again used   intraoperative fluorescein angiography to evaluate perfusion of all tissues,   which were noted to be excellent.  There were no complications.  All counts   correct.        ______________________________  MD ELIZ Benavides/AQS  DD:  11/20/2024  Time:  07:06AM  DT:  11/20/2024  Time:  08:01AM  Job #:  [id number]      OPERATIVE REPORT

## 2024-11-20 NOTE — PROGRESS NOTES
Pt ate some fruit and then vomited. Pt instructed to remain on clear liquids until nausea completely resolved.

## 2024-11-20 NOTE — PROGRESS NOTES
PRS     Patient doing well this morning but did have episodes of large volume emesis last night.   AFVSS. SBP 90s.   Flaps well perfused.   Incisions C/D/I.   Drains SS/thin.     PLAN:  Will give another 1L bolus of fluid due to vomiting last night.   Lovenox 30 daily.   Up out of bed/ambulate.  Plan for possible D/C home tomorrow if she remains stable.

## 2024-11-21 PROBLEM — Z90.13 H/O BILATERAL MASTECTOMY: Status: ACTIVE | Noted: 2024-11-21

## 2024-11-21 PROCEDURE — 25000003 PHARM REV CODE 250: Performed by: SURGERY

## 2024-11-21 PROCEDURE — 99024 POSTOP FOLLOW-UP VISIT: CPT | Mod: ,,, | Performed by: SURGERY

## 2024-11-21 PROCEDURE — G0378 HOSPITAL OBSERVATION PER HR: HCPCS

## 2024-11-21 RX ORDER — ENOXAPARIN SODIUM 100 MG/ML
30 INJECTION SUBCUTANEOUS EVERY 24 HOURS
Status: DISCONTINUED | OUTPATIENT
Start: 2024-11-21 | End: 2024-11-21 | Stop reason: HOSPADM

## 2024-11-21 RX ADMIN — CYCLOBENZAPRINE 10 MG: 10 TABLET, FILM COATED ORAL at 04:11

## 2024-11-21 NOTE — DISCHARGE SUMMARY
[facility] - Med Surg  Discharge Note  Short Stay    Procedure(s) (LRB):  RECONSTRUCTION, BREAST Right breast recon with latissimus dorsi myoccutaneous flap) (Right)  INSERTION, TISSUE EXPANDER, BREAST (right breast placement of tissue expander, left breast recon with tissue expander) (Bilateral)  INSERTION, BIOLOGIC IMPLANT, FOR SOFT TISSUE REINFORCEMENT (bilateral breast placement of acelullar dermal matrix) (Bilateral)  INJECTION, AGENT, INTRAVENOUS, FOR ASSESSMENT OF BLOOD FLOW IN FLAP OR GRAFT (Use of intraoperative fluoroscein angiography) (Bilateral)      OUTCOME: Patient tolerated treatment/procedure well without complication and is now ready for discharge.    DISPOSITION: Home or Self Care    FINAL DIAGNOSIS:  <principal problem not specified>    FOLLOWUP: In clinic    DISCHARGE INSTRUCTIONS:  No discharge procedures on file.     TIME SPENT ON DISCHARGE: 5 minutes

## 2024-11-21 NOTE — PROGRESS NOTES
PRS     Patient doing well, denies complaints, she has been up ambulating.   AVFSS.   Incisions C/D/I. Flaps well perfused.   Drains SS/thin.     PLAN    Patient doing well.   Stable for discharge home.   Sponge bathe, leave surgical dressings/bra in place.   Drain care teaching.   Follow up Monday.

## 2024-11-22 VITALS
RESPIRATION RATE: 18 BRPM | DIASTOLIC BLOOD PRESSURE: 64 MMHG | BODY MASS INDEX: 31.6 KG/M2 | TEMPERATURE: 99 F | HEART RATE: 94 BPM | SYSTOLIC BLOOD PRESSURE: 96 MMHG | WEIGHT: 196.63 LBS | HEIGHT: 66 IN | OXYGEN SATURATION: 96 %

## 2024-12-16 ENCOUNTER — OFFICE VISIT (OUTPATIENT)
Dept: HEMATOLOGY/ONCOLOGY | Facility: CLINIC | Age: 39
End: 2024-12-16
Payer: MEDICAID

## 2024-12-16 VITALS
HEART RATE: 78 BPM | RESPIRATION RATE: 18 BRPM | OXYGEN SATURATION: 100 % | DIASTOLIC BLOOD PRESSURE: 77 MMHG | TEMPERATURE: 98 F | BODY MASS INDEX: 31.84 KG/M2 | WEIGHT: 198.13 LBS | HEIGHT: 66 IN | SYSTOLIC BLOOD PRESSURE: 121 MMHG

## 2024-12-16 DIAGNOSIS — C50.411 MALIGNANT NEOPLASM OF UPPER-OUTER QUADRANT OF RIGHT BREAST IN FEMALE, ESTROGEN RECEPTOR NEGATIVE: Primary | ICD-10-CM

## 2024-12-16 DIAGNOSIS — Z17.1 MALIGNANT NEOPLASM OF UPPER-OUTER QUADRANT OF RIGHT BREAST IN FEMALE, ESTROGEN RECEPTOR NEGATIVE: Primary | ICD-10-CM

## 2024-12-16 PROCEDURE — 3078F DIAST BP <80 MM HG: CPT | Mod: CPTII,,,

## 2024-12-16 PROCEDURE — 3074F SYST BP LT 130 MM HG: CPT | Mod: CPTII,,,

## 2024-12-16 PROCEDURE — 99214 OFFICE O/P EST MOD 30 MIN: CPT | Mod: ,,,

## 2024-12-16 PROCEDURE — 3008F BODY MASS INDEX DOCD: CPT | Mod: CPTII,,,

## 2024-12-16 NOTE — PROGRESS NOTES
Referring provider:  Dr. Burgess  Reason for consultation:  Triple negative right breast cancer     Diagnosis:     Right breast invasive ductal carcinoma, triple negative  ER negative, UT negative, HER2 IHC 0, Ki-67 85%   [id number], stage IIIC     Treatment history:    02/02/2023-7/26/23:  Weekly Carbo Taxol plus q. 3 weeks pembrolizumab followed by q. 3 weeks AC plus pembro   09/12/2023 - Bilateral mastectomy  10/24/23-11/29/23: Adjuvant radiation therapy    Current Treatment:    12/18/2023- 06/19/24: Pembrolizumab      HPI  Patient is a 37-year-old with no significant medical history who noticed a mass right breast in November 2022 which led to further workup including mammogram, ultrasound and biopsy, pathology from biopsy in January 2023 revealed triple negative breast cancer, grade 3.  Patient had further workup including a PET-CT with Dr. Burgess not reveal any distant metastatic disease.  She presented to our clinic on 01/23/2023 to establish care for further disease management.      Patient denies any history of smoking, alcohol use or recreational drug use.  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  She has an ECOG of 0.  She has a mother and sister accompanied to her initial visit.  Family history of lung cancer in her grandfather and liver cancer in 1 of her uncles.  No history of breast cancer in the family.    Interval History:     Today, 12/16/24, patient returns for breast cancer surveillance. She reports completing part of her breast reconstruction with expanders since last follow-up. She is doing well other than some slight breast pain from surgery. She has follow-up with Dr. Gutierrez on 12/30/24    Pathology  01/03/2023 right breast core biopsy and right axillary lymph node core biopsy:  Invasive ductal carcinoma, grade 3, right axillary lymph node biopsy with fragments of poorly differentiated malignant tumor consistent with ductal carcinoma.  Normal  lymph node identified.  ER negative, AK negative, HER2 IHC 0, Ki-67 85.3%.    23 Right Breast radical mastectomy- Right breast found to have no residual invasive or in situ carcinoma. Benign breast Parenchyma with changes consistent with previous biopsy, fibrosis and ductal hyperplasia without atypia. All margins negative for invasive and in situ carcinoma. 3 lymph nodes excised and found to be negative for metastatic carcinoma.     Imagin2024 CT chest PE protocol:  No PE.  Heterogeneous ground-glass and consolidative opacities in the anterior right lung in the upper middle and lower lobes.  This may reflect posttreatment change but recommend clinical correlation pneumonia.  Neoplastic disease also can not be excluded.  Comparison with prior exam is recommended if available.    24 Abdominal US: Unremarkable findings  24 CT Chest w/ contrast: findings consistent with post radiation changes in the right upper lobe, however, there are patchy areas of groundglass opacity in this region, which are nonspecific and may need clinical findings to exclude acute infiltrate/pneumonia. Otherwise no evidence of new malignancy or metastatic disease.  2023 CT abdomen pelvis with contrast:  No evidence of neoplastic disease, small umbilical hernia.    2022 echo EF 60%     01/10/2023 PET-CT:  Hypermetabolic mass in the superior right breast in keeping with the known cancer popliteal additional areas of mild-to-moderate uptake in the superior and retroareolar right breast are understood with definite discrete mass on CT but worrisome for infiltrative breast cancer.  Multiple hypermetabolic right axillary subpectoral and right internal mammary lymph nodes consistent with metastatic disease.  No other suspicious uptake.  Right breast mass measures 3 by 2.8 cm with an SUV of 13.7.  Inferior to the mass there was mild diffuse uptake associated with non-mass like dense breast tissue with an SUV of  4.0.  There is also abnormal uptake in the retroareolar right breast with definitive discrete mass can not be  from the dense glandular tissue with an SUV of 5.7 and measuring 1.5 x 1.1 cm.  These are concerning for infiltrative malignancies.    Laboratory   09/08/24: cr 1.31, LFTs WNL, TSH 0.486, cortisol 5.8, wbc 7.00,. hgb 11.1, plt 228  06/19/2024 CMP unremarkable, TSH 1.1323, cortisol 15.7, WBC count 7.63, hemoglobin 11.9, MCV 92.1, platelet count 276, ANC 5.18.    06/04/2024 CMP unremarkable, TSH 0.8, cortisol 14.5, WBC count 9.4, hemoglobin 12.3, MCV 92.1, platelet count 2 1 3, ANC 7.1.  05/14/24: CMP WNL, TSH 0.88, cortisol 5.5, wbc 6.99, hgb 11.3, plt 238, ANC 4.84  4/21/24 cr 0.88, TSH 0.6571, WBC 10.61, HGB 11.3, , ANC 8.19  3/30/2024:  Creatinine 0.89, liver enzymes WNL, T. Bili WNL, WBC 6.19, Hgb 11.6, Hct 34.8, Plt 225,000, TSH 0.99, cortisol 14.5.  03/10/24: CMP WNL, TSH 0.58, cortisol 13.3, wbc 7.25, hgb 11.0, plt 208, ANC 5.06  02/15/24 creatinine 0.74, albumin 2.6, LFTs unremarkable, WBC count 7.7, hemoglobin 14.3, .6, platelet count 380, ANC 6.5.    1/28/24: CMP and CBC WNL TSH 1.6687, Cortisol 9.6  01/07/2024: CMP WNL, TSH 0.8660, cortisol 7.7, wbc 5.20, hgb 11.7, plt 197, ANC 2.89  12/17/23: CMP unremarkable, TSH 0.514, cortisol 11.9, WBC 5.89, Hb 12.1, , ANC 3.59    12/04/2023 CMP unremarkable, WBC count 3.46, hemoglobin 11.6, platelet count 2 1 1.  TSH 1.9.  11/13/23: cr 0.78, alb 3.7, ca 9.3, LFTs WNL, TSH 1.5, wbc 8, hgb 11.1, plt 227, ANC 6.08  10/09/23: cr 0.82, alb 3.2, ca 8.9, LFTs WNL, TSH 1.5, wbc 6.93, hgb 9.8, plt 258, ANC 3.10  07/25/23: CMP WNL, wbc 3.83, hgb 9.4, plt 297, ANC 1.55, mag 1.8, phosphorus 4.2  07/04/2023: CMP unremarkable, TSH 0.50, cortisol 8.6, wbc 5.83, hgb 9.7, plt 307, ANC 3.57  06/12/2023:  Creatinine 0.87, albumin 3.7, LFTs within normal limits, calcium 9.5, magnesium 1.8, phosphorus 4.2, TSH 0.33, WBC count 3.9, hemoglobin 9.6,  MCV 95, platelet count 327, ANC 1.87.   05/22/23 cr 0.81, alb 3.8, ca 9.6, LFTs WNL, mag 1.8, phosphorus 3.8, wbc 6.99, hgb 9.6, plt 297, ANC 3.95  05/01/23: Cr 0.85, alb 3.8, ca 9.3, AST 45, ALT 70, mag 1.7, phosphorus 3.6, wbc 4.85, hgb 10.2, plt 197, ANC 2.20  04/24/2023:  Creatinine 0.8, albumin 3.6, alkaline phosphatase 77, total bili 0.4, AST 39, ALT 87, magnesium 1.7, phosphorus 3.3, WBC count 9.6, ANC 4.9, platelet count 196, hemoglobin 9.6.  3/23/23: cr 0.79 , Mg 1.7 TSH 1.8  AST 32  wbc 7.92 hgb 10.0 Plt 215  03/15/23: cr 0.81, TB 0.7, mag 2.0, TSH 0.7533, cortisol 17.9, , , wbc 4.62, hgb 11.1, plt 238, ANC 1.89, UPT neg  03/08/2023:  Creatinine 0.83, AST 73, , bilirubin 0.5, TSH 0.75, WBC 5.7, hemoglobin 11, platelets 222  03/02/2023:  WBC count 5.17, hemoglobin 10.2, MCV 91.1, platelet count 265, ANC 1.89, urine pregnancy negative, creatinine 0.9, albu3.5, ca 9.2, alkaline phosphatase 76, total bilirubin 0.3, AST 53, , magnesium 1.8, phosphorus 2.7, TSH 0.9.  02/22/2023: WBC 3.02, Hgb 10.9, MCV 88.0, , ANC 1.4 3, UPT negative, creatinine 0.82 albumin 3.7 magnesium 2.1 phosphorus 2.1, TSH 0.6540, cortisol 8.7  02/15/23 Cr 0.83, alb 3.7, LFTs WNL, mag 1.7, phosphorus 2.0, TSH 0.4434, cortisol 10.6, wbc 3.32, hgb 11.3, plt 266, ANC 1.42 UPT negative   02/08/23: cr 0.79, alb 3.5, ca 9.3, AST 36, ALT 45, mag 1.9, phosphorus 2.1, wbc 4.47, hgb 11.1, plt 264, ANC 2.28  02/01/2023:  Creatinine 0.83, albumin 3.7, LFTs within normal limits, TSH 1.09, cortisol 8.8, free T4 0.98, WBC 7.7, Hb 11.5, MCV 87.9, platelet count 283, ANC 4.09, urine pregnancy negative.    Past Medical History:   Diagnosis Date    Breast cancer     GERD (gastroesophageal reflux disease)     Patient-requested procedure      Past Surgical History:   Procedure Laterality Date    ABLATION      uterine    BREAST RECONSTRUCTION Right 11/19/2024    Procedure: RECONSTRUCTION, BREAST Right breast recon with  latissimus dorsi myoccutaneous flap);  Surgeon: Jorge Gutierrez MD;  Location: [facility] OR;  Service: Plastics;  Laterality: Right;    HYSTERECTOMY      INJECTION, AGENT, INTRAVENOUS, FOR ASSESSMENT OF BLOOD FLOW IN FLAP OR GRAFT Bilateral 11/19/2024    Procedure: INJECTION, AGENT, INTRAVENOUS, FOR ASSESSMENT OF BLOOD FLOW IN FLAP OR GRAFT (Use of intraoperative fluoroscein angiography);  Surgeon: Jorge Gutierrez MD;  Location: [facility] OR;  Service: Plastics;  Laterality: Bilateral;    INSERTION OF BREAST TISSUE EXPANDER Bilateral 11/19/2024    Procedure: INSERTION, TISSUE EXPANDER, BREAST (right breast placement of tissue expander, left breast recon with tissue expander);  Surgeon: Jorge Gutierrez MD;  Location: [facility] OR;  Service: Plastics;  Laterality: Bilateral;    INSERTION, BIOLOGIC IMPLANT, FOR SOFT TISSUE REINFORCEMENT Bilateral 11/19/2024    Procedure: INSERTION, BIOLOGIC IMPLANT, FOR SOFT TISSUE REINFORCEMENT (bilateral breast placement of acelullar dermal matrix);  Surgeon: Jorge Gutierrez MD;  Location: [facility] OR;  Service: Plastics;  Laterality: Bilateral;    MASTECTOMY Bilateral     right nodes    MEDIPORT INSERTION, DOUBLE Left 01/26/2023    TUBAL LIGATION         Family History   Problem Relation Name Age of Onset    Diabetes Maternal Grandmother [facility]     Lung cancer Maternal Grandfather [facility]     Liver cancer Maternal Uncle [facility]        Social History     Socioeconomic History    Marital status: Single   Tobacco Use    Smoking status: Never    Smokeless tobacco: Never   Substance and Sexual Activity    Alcohol use: Yes     Comment: occasionally    Drug use: Never    Sexual activity: Yes     Partners: Male       Current Outpatient Medications   Medication Sig Dispense Refill    boric acid (PH-D) 600 mg vaginal suppository INSERT 1 SUPPOSITORY VAGINALLY DAILY AS NEEDED      hydrOXYzine pamoate (VISTARIL) 25 MG Cap Take 1 capsule (25 mg total) by mouth every 8 (eight) hours as needed  "(itching). 30 capsule 1    LINZESS 290 mcg Cap capsule Take 290 mcg by mouth.      OLANZapine (ZYPREXA) 10 MG tablet Take by mouth.      predniSONE (DELTASONE) 20 MG tablet Take 20 mg by mouth. (Patient not taking: Reported on 9/16/2024)      promethazine (PHENERGAN) 12.5 MG Tab Take 1 tablet (12.5 mg total) by mouth every 4 (four) hours as needed. 60 tablet 1    promethazine-dextromethorphan (PROMETHAZINE-DM) 6.25-15 mg/5 mL Syrp Take by mouth. (Patient not taking: Reported on 6/19/2024)      traMADoL (ULTRAM) 50 mg tablet Take 50 mg by mouth every 4 (four) hours.       No current facility-administered medications for this visit.     Review of patient's allergies indicates:  No Known Allergies         Physical Exam:   Blood pressure 121/77, pulse 78, temperature 97.8 °F (36.6 °C), temperature source Oral, resp. rate 18, height 5' 6" (1.676 m), weight 89.9 kg (198 lb 1.6 oz), SpO2 100%.     ECOG PS 0  GA: AAOx3, NAD  HEENT: NCAT, no tenderness to head with palpation. good dentition, moist oral mucous membranes.  LYMPH: no cervical, axillary or supraclavicular adenopathy  CVS: s1s2 RRR, no M/R/G  RESP: CTA b/l, no crackles, no wheezes or rhonchi  BREAST:  Right breast with well-healed incision without nodularity, new lumps or bumps or skin changes.  Right axilla without evidence of adenopathy.  ABD: soft, NT, ND, BS+, no hepatosplenomegaly  EXT: no deformities, no pedal edema  SKIN: no rashes, warm and dry  NEURO: normal mentation, strength 5/5 on all 4 extremities, no sensory deficits      Assessment:       # Right breast invasive ductal carcinoma, triple negative  ER negative, MT negative, HER2 IHC 0, Ki-67 85%   [id number], stage IIIC   Discussed with patient the diagnosis of triple negative breast cancer, treatment recommendations including neoadjuvant chemotherapy followed by surgery followed by adjuvant chemotherapy + radiation therapy   Reviewed PET-CT without evidence of distant metastatic disease.  Will plan " for treatment with neoadjuvant carbo Taxol pembrolizumab followed by AC pembrolizumab prior to surgical resection   S/p MRI bilateral breasts   Patient is not interested in fertility preservation  She is status post tubal ligation, discuss the need for dual contraception while on chemotherapy.  Baseline Echo 60%  S/p port placement and chemo ed  Status post neoadjuvant chemotherapy and immunotherapy  Genetic counseling done, negative.  S/P Bilateral Mastectomy, with complete pathological response  Status post adjuvant radiation therapy on 11/29/2023  Initiated adjuvant pembrolizumab on 12/18/2023    Plan  Will plan to follow-up q 3-4 months for next 2 years. No labs needed  Continue follow-up with Dr. Gutierrez and radiation oncology as scheduled.

## 2025-03-19 ENCOUNTER — OFFICE VISIT (OUTPATIENT)
Dept: HEMATOLOGY/ONCOLOGY | Facility: CLINIC | Age: 40
End: 2025-03-19
Payer: MEDICAID

## 2025-03-19 VITALS
HEART RATE: 78 BPM | WEIGHT: 210.69 LBS | SYSTOLIC BLOOD PRESSURE: 127 MMHG | TEMPERATURE: 98 F | RESPIRATION RATE: 16 BRPM | BODY MASS INDEX: 33.86 KG/M2 | HEIGHT: 66 IN | DIASTOLIC BLOOD PRESSURE: 77 MMHG | OXYGEN SATURATION: 100 %

## 2025-03-19 DIAGNOSIS — C50.411 MALIGNANT NEOPLASM OF UPPER-OUTER QUADRANT OF RIGHT BREAST IN FEMALE, ESTROGEN RECEPTOR NEGATIVE: Primary | ICD-10-CM

## 2025-03-19 DIAGNOSIS — Z17.1 MALIGNANT NEOPLASM OF UPPER-OUTER QUADRANT OF RIGHT BREAST IN FEMALE, ESTROGEN RECEPTOR NEGATIVE: Primary | ICD-10-CM

## 2025-03-19 PROCEDURE — 3008F BODY MASS INDEX DOCD: CPT | Mod: CPTII,,,

## 2025-03-19 PROCEDURE — 1159F MED LIST DOCD IN RCRD: CPT | Mod: CPTII,,,

## 2025-03-19 PROCEDURE — 3078F DIAST BP <80 MM HG: CPT | Mod: CPTII,,,

## 2025-03-19 PROCEDURE — 99214 OFFICE O/P EST MOD 30 MIN: CPT | Mod: ,,,

## 2025-03-19 PROCEDURE — 3074F SYST BP LT 130 MM HG: CPT | Mod: CPTII,,,

## 2025-03-19 NOTE — PROGRESS NOTES
Referring provider:  Dr. Burgess  Reason for consultation:  Triple negative right breast cancer     Diagnosis:     Right breast invasive ductal carcinoma, triple negative  ER negative, LA negative, HER2 IHC 0, Ki-67 85%   [id number], stage IIIC     Treatment history:    02/02/2023-7/26/23:  Weekly Carbo Taxol plus q. 3 weeks pembrolizumab followed by q. 3 weeks AC plus pembro   09/12/2023 - Bilateral mastectomy  10/24/23-11/29/23: Adjuvant radiation therapy    Current Treatment:    12/18/2023- 06/19/24: Pembrolizumab      HPI  Patient is a 37-year-old with no significant medical history who noticed a mass right breast in November 2022 which led to further workup including mammogram, ultrasound and biopsy, pathology from biopsy in January 2023 revealed triple negative breast cancer, grade 3.  Patient had further workup including a PET-CT with Dr. Burgess not reveal any distant metastatic disease.  She presented to our clinic on 01/23/2023 to establish care for further disease management.      Patient denies any history of smoking, alcohol use or recreational drug use.  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  She has an ECOG of 0.  She has a mother and sister accompanied to her initial visit.  Family history of lung cancer in her grandfather and liver cancer in 1 of her uncles.  No history of breast cancer in the family.    Interval History:     Today, 03/19/25, patient returns for breast cancer surveillance. She is currently waiting to complete breast reconstruction in next couple months. . She is doing well other than some slight breast pain from surgery. She has follow-up with Dr. Gutierrez in 3 weeks.     Pathology  01/03/2023 right breast core biopsy and right axillary lymph node core biopsy:  Invasive ductal carcinoma, grade 3, right axillary lymph node biopsy with fragments of poorly differentiated malignant tumor consistent with ductal carcinoma.  Normal lymph node  identified.  ER negative, OH negative, HER2 IHC 0, Ki-67 85.3%.    23 Right Breast radical mastectomy- Right breast found to have no residual invasive or in situ carcinoma. Benign breast Parenchyma with changes consistent with previous biopsy, fibrosis and ductal hyperplasia without atypia. All margins negative for invasive and in situ carcinoma. 3 lymph nodes excised and found to be negative for metastatic carcinoma.     Imagin2024 CT chest PE protocol:  No PE.  Heterogeneous ground-glass and consolidative opacities in the anterior right lung in the upper middle and lower lobes.  This may reflect posttreatment change but recommend clinical correlation pneumonia.  Neoplastic disease also can not be excluded.  Comparison with prior exam is recommended if available.    24 Abdominal US: Unremarkable findings  24 CT Chest w/ contrast: findings consistent with post radiation changes in the right upper lobe, however, there are patchy areas of groundglass opacity in this region, which are nonspecific and may need clinical findings to exclude acute infiltrate/pneumonia. Otherwise no evidence of new malignancy or metastatic disease.  2023 CT abdomen pelvis with contrast:  No evidence of neoplastic disease, small umbilical hernia.    2022 echo EF 60%     01/10/2023 PET-CT:  Hypermetabolic mass in the superior right breast in keeping with the known cancer popliteal additional areas of mild-to-moderate uptake in the superior and retroareolar right breast are understood with definite discrete mass on CT but worrisome for infiltrative breast cancer.  Multiple hypermetabolic right axillary subpectoral and right internal mammary lymph nodes consistent with metastatic disease.  No other suspicious uptake.  Right breast mass measures 3 by 2.8 cm with an SUV of 13.7.  Inferior to the mass there was mild diffuse uptake associated with non-mass like dense breast tissue with an SUV of 4.0.  There  is also abnormal uptake in the retroareolar right breast with definitive discrete mass can not be  from the dense glandular tissue with an SUV of 5.7 and measuring 1.5 x 1.1 cm.  These are concerning for infiltrative malignancies.    Laboratory   09/08/24: cr 1.31, LFTs WNL, TSH 0.486, cortisol 5.8, wbc 7.00,. hgb 11.1, plt 228  06/19/2024 CMP unremarkable, TSH 1.1323, cortisol 15.7, WBC count 7.63, hemoglobin 11.9, MCV 92.1, platelet count 276, ANC 5.18.    06/04/2024 CMP unremarkable, TSH 0.8, cortisol 14.5, WBC count 9.4, hemoglobin 12.3, MCV 92.1, platelet count 2 1 3, ANC 7.1.  05/14/24: CMP WNL, TSH 0.88, cortisol 5.5, wbc 6.99, hgb 11.3, plt 238, ANC 4.84  4/21/24 cr 0.88, TSH 0.6571, WBC 10.61, HGB 11.3, , ANC 8.19  3/30/2024:  Creatinine 0.89, liver enzymes WNL, T. Bili WNL, WBC 6.19, Hgb 11.6, Hct 34.8, Plt 225,000, TSH 0.99, cortisol 14.5.  03/10/24: CMP WNL, TSH 0.58, cortisol 13.3, wbc 7.25, hgb 11.0, plt 208, ANC 5.06  02/15/24 creatinine 0.74, albumin 2.6, LFTs unremarkable, WBC count 7.7, hemoglobin 14.3, .6, platelet count 380, ANC 6.5.    1/28/24: CMP and CBC WNL TSH 1.6687, Cortisol 9.6  01/07/2024: CMP WNL, TSH 0.8660, cortisol 7.7, wbc 5.20, hgb 11.7, plt 197, ANC 2.89  12/17/23: CMP unremarkable, TSH 0.514, cortisol 11.9, WBC 5.89, Hb 12.1, , ANC 3.59    12/04/2023 CMP unremarkable, WBC count 3.46, hemoglobin 11.6, platelet count 2 1 1.  TSH 1.9.  11/13/23: cr 0.78, alb 3.7, ca 9.3, LFTs WNL, TSH 1.5, wbc 8, hgb 11.1, plt 227, ANC 6.08  10/09/23: cr 0.82, alb 3.2, ca 8.9, LFTs WNL, TSH 1.5, wbc 6.93, hgb 9.8, plt 258, ANC 3.10  07/25/23: CMP WNL, wbc 3.83, hgb 9.4, plt 297, ANC 1.55, mag 1.8, phosphorus 4.2  07/04/2023: CMP unremarkable, TSH 0.50, cortisol 8.6, wbc 5.83, hgb 9.7, plt 307, ANC 3.57  06/12/2023:  Creatinine 0.87, albumin 3.7, LFTs within normal limits, calcium 9.5, magnesium 1.8, phosphorus 4.2, TSH 0.33, WBC count 3.9, hemoglobin 9.6, MCV 95,  platelet count 327, ANC 1.87.   05/22/23 cr 0.81, alb 3.8, ca 9.6, LFTs WNL, mag 1.8, phosphorus 3.8, wbc 6.99, hgb 9.6, plt 297, ANC 3.95  05/01/23: Cr 0.85, alb 3.8, ca 9.3, AST 45, ALT 70, mag 1.7, phosphorus 3.6, wbc 4.85, hgb 10.2, plt 197, ANC 2.20  04/24/2023:  Creatinine 0.8, albumin 3.6, alkaline phosphatase 77, total bili 0.4, AST 39, ALT 87, magnesium 1.7, phosphorus 3.3, WBC count 9.6, ANC 4.9, platelet count 196, hemoglobin 9.6.  3/23/23: cr 0.79 , Mg 1.7 TSH 1.8  AST 32  wbc 7.92 hgb 10.0 Plt 215  03/15/23: cr 0.81, TB 0.7, mag 2.0, TSH 0.7533, cortisol 17.9, , , wbc 4.62, hgb 11.1, plt 238, ANC 1.89, UPT neg  03/08/2023:  Creatinine 0.83, AST 73, , bilirubin 0.5, TSH 0.75, WBC 5.7, hemoglobin 11, platelets 222  03/02/2023:  WBC count 5.17, hemoglobin 10.2, MCV 91.1, platelet count 265, ANC 1.89, urine pregnancy negative, creatinine 0.9, albu3.5, ca 9.2, alkaline phosphatase 76, total bilirubin 0.3, AST 53, , magnesium 1.8, phosphorus 2.7, TSH 0.9.  02/22/2023: WBC 3.02, Hgb 10.9, MCV 88.0, , ANC 1.4 3, UPT negative, creatinine 0.82 albumin 3.7 magnesium 2.1 phosphorus 2.1, TSH 0.6540, cortisol 8.7  02/15/23 Cr 0.83, alb 3.7, LFTs WNL, mag 1.7, phosphorus 2.0, TSH 0.4434, cortisol 10.6, wbc 3.32, hgb 11.3, plt 266, ANC 1.42 UPT negative   02/08/23: cr 0.79, alb 3.5, ca 9.3, AST 36, ALT 45, mag 1.9, phosphorus 2.1, wbc 4.47, hgb 11.1, plt 264, ANC 2.28  02/01/2023:  Creatinine 0.83, albumin 3.7, LFTs within normal limits, TSH 1.09, cortisol 8.8, free T4 0.98, WBC 7.7, Hb 11.5, MCV 87.9, platelet count 283, ANC 4.09, urine pregnancy negative.    Past Medical History:   Diagnosis Date    Breast cancer     GERD (gastroesophageal reflux disease)     Patient-requested procedure      Past Surgical History:   Procedure Laterality Date    ABLATION      uterine    BREAST RECONSTRUCTION Right 11/19/2024    Procedure: RECONSTRUCTION, BREAST Right breast recon with  latissimus dorsi myoccutaneous flap);  Surgeon: Jorge Gutierrez MD;  Location: [facility] OR;  Service: Plastics;  Laterality: Right;    HYSTERECTOMY      INJECTION, AGENT, INTRAVENOUS, FOR ASSESSMENT OF BLOOD FLOW IN FLAP OR GRAFT Bilateral 11/19/2024    Procedure: INJECTION, AGENT, INTRAVENOUS, FOR ASSESSMENT OF BLOOD FLOW IN FLAP OR GRAFT (Use of intraoperative fluoroscein angiography);  Surgeon: Jorge Gutierrez MD;  Location: [facility] OR;  Service: Plastics;  Laterality: Bilateral;    INSERTION OF BREAST TISSUE EXPANDER Bilateral 11/19/2024    Procedure: INSERTION, TISSUE EXPANDER, BREAST (right breast placement of tissue expander, left breast recon with tissue expander);  Surgeon: Jorge Gutierrez MD;  Location: [facility] OR;  Service: Plastics;  Laterality: Bilateral;    INSERTION, BIOLOGIC IMPLANT, FOR SOFT TISSUE REINFORCEMENT Bilateral 11/19/2024    Procedure: INSERTION, BIOLOGIC IMPLANT, FOR SOFT TISSUE REINFORCEMENT (bilateral breast placement of acelullar dermal matrix);  Surgeon: Jorge Guteirrez MD;  Location: [facility] OR;  Service: Plastics;  Laterality: Bilateral;    MASTECTOMY Bilateral     right nodes    MEDIPORT INSERTION, DOUBLE Left 01/26/2023    TUBAL LIGATION         Family History   Problem Relation Name Age of Onset    Diabetes Maternal Grandmother [facility]     Lung cancer Maternal Grandfather [facility]     Liver cancer Maternal Uncle [facility]        Social History     Socioeconomic History    Marital status: Single   Tobacco Use    Smoking status: Never    Smokeless tobacco: Never   Substance and Sexual Activity    Alcohol use: Yes     Comment: occasionally    Drug use: Never    Sexual activity: Yes     Partners: Male       Current Outpatient Medications   Medication Sig Dispense Refill    boric acid (PH-D) 600 mg vaginal suppository       hydrOXYzine pamoate (VISTARIL) 25 MG Cap Take 1 capsule (25 mg total) by mouth every 8 (eight) hours as needed (itching). 30 capsule 1    LINZESS 290 mcg Cap capsule  "Take 290 mcg by mouth.      OLANZapine (ZYPREXA) 10 MG tablet Take by mouth.      predniSONE (DELTASONE) 20 MG tablet Take 20 mg by mouth.      promethazine (PHENERGAN) 12.5 MG Tab Take 1 tablet (12.5 mg total) by mouth every 4 (four) hours as needed. 60 tablet 1    promethazine-dextromethorphan (PROMETHAZINE-DM) 6.25-15 mg/5 mL Syrp Take by mouth. (Patient not taking: Reported on 6/19/2024)      traMADoL (ULTRAM) 50 mg tablet Take 50 mg by mouth every 4 (four) hours.       No current facility-administered medications for this visit.     Review of patient's allergies indicates:  No Known Allergies         Physical Exam:   Blood pressure 127/77, pulse 78, temperature 97.7 °F (36.5 °C), temperature source Oral, resp. rate 16, height 5' 6" (1.676 m), weight 95.6 kg (210 lb 11.2 oz), SpO2 100%.     ECOG PS 0  GA: AAOx3, NAD  HEENT: NCAT, no tenderness to head with palpation. good dentition, moist oral mucous membranes.  LYMPH: no cervical, axillary or supraclavicular adenopathy  CVS: s1s2 RRR, no M/R/G  RESP: CTA b/l, no crackles, no wheezes or rhonchi  BREAST:  Right breast with well-healed incision without nodularity, new lumps or bumps or skin changes.  Right axilla without evidence of adenopathy.  ABD: soft, NT, ND, BS+, no hepatosplenomegaly  EXT: no deformities, no pedal edema  SKIN: no rashes, warm and dry  NEURO: normal mentation, strength 5/5 on all 4 extremities, no sensory deficits      Assessment:       # Right breast invasive ductal carcinoma, triple negative  ER negative, WA negative, HER2 IHC 0, Ki-67 85%   [id number], stage IIIC   Discussed with patient the diagnosis of triple negative breast cancer, treatment recommendations including neoadjuvant chemotherapy followed by surgery followed by adjuvant chemotherapy + radiation therapy   Reviewed PET-CT without evidence of distant metastatic disease.  Will plan for treatment with neoadjuvant carbo Taxol pembrolizumab followed by AC pembrolizumab prior to " surgical resection   S/p MRI bilateral breasts   Patient is not interested in fertility preservation  She is status post tubal ligation, discuss the need for dual contraception while on chemotherapy.  Baseline Echo 60%  S/p port placement and chemo ed  Status post neoadjuvant chemotherapy and immunotherapy  Genetic counseling done, negative.  S/P Bilateral Mastectomy, with complete pathological response  Status post adjuvant radiation therapy on 11/29/2023  Initiated adjuvant pembrolizumab on 12/18/2023    Plan  Will plan to follow-up q 4 months for next 2 years. No labs needed  Continue follow-up with Dr. Gutierrez and radiation oncology as scheduled.

## 2025-06-10 ENCOUNTER — HOSPITAL ENCOUNTER (OUTPATIENT)
Facility: HOSPITAL | Age: 40
Discharge: HOME | End: 2025-06-10
Attending: SURGERY | Admitting: SURGERY
Payer: MEDICAID

## 2025-06-10 ENCOUNTER — ANESTHESIA EVENT (OUTPATIENT)
Dept: SURGERY | Facility: HOSPITAL | Age: 40
End: 2025-06-10
Payer: MEDICAID

## 2025-06-10 ENCOUNTER — ANESTHESIA (OUTPATIENT)
Dept: SURGERY | Facility: HOSPITAL | Age: 40
End: 2025-06-10
Payer: MEDICAID

## 2025-06-10 DIAGNOSIS — Z42.1 ENCOUNTER FOR BREAST RECONSTRUCTION FOLLOWING MASTECTOMY: ICD-10-CM

## 2025-06-10 DIAGNOSIS — Z85.3 PERSONAL HISTORY OF MALIGNANT NEOPLASM OF BREAST: ICD-10-CM

## 2025-06-10 PROCEDURE — 63600175 PHARM REV CODE 636 W HCPCS: Performed by: SURGERY

## 2025-06-10 PROCEDURE — 36000707: Performed by: SURGERY

## 2025-06-10 PROCEDURE — 63600175 PHARM REV CODE 636 W HCPCS

## 2025-06-10 PROCEDURE — C1789 PROSTHESIS, BREAST, IMP: HCPCS | Performed by: SURGERY

## 2025-06-10 PROCEDURE — 71000016 HC POSTOP RECOV ADDL HR: Performed by: SURGERY

## 2025-06-10 PROCEDURE — 37000009 HC ANESTHESIA EA ADD 15 MINS: Performed by: SURGERY

## 2025-06-10 PROCEDURE — 25000003 PHARM REV CODE 250: Performed by: NURSE ANESTHETIST, CERTIFIED REGISTERED

## 2025-06-10 PROCEDURE — 15877 SUCTION LIPECTOMY TRUNK: CPT | Mod: 51,,, | Performed by: SURGERY

## 2025-06-10 PROCEDURE — 71000015 HC POSTOP RECOV 1ST HR: Performed by: SURGERY

## 2025-06-10 PROCEDURE — 37000008 HC ANESTHESIA 1ST 15 MINUTES: Performed by: SURGERY

## 2025-06-10 PROCEDURE — 63600175 PHARM REV CODE 636 W HCPCS: Mod: JZ,TB | Performed by: ANESTHESIOLOGY

## 2025-06-10 PROCEDURE — 19370 REVJ PERI-IMPLT CAPSULE BRST: CPT | Mod: 50,,, | Performed by: SURGERY

## 2025-06-10 PROCEDURE — 71000033 HC RECOVERY, INTIAL HOUR: Performed by: SURGERY

## 2025-06-10 PROCEDURE — 36000706: Performed by: SURGERY

## 2025-06-10 PROCEDURE — 63600175 PHARM REV CODE 636 W HCPCS: Performed by: ANESTHESIOLOGY

## 2025-06-10 PROCEDURE — 63600175 PHARM REV CODE 636 W HCPCS: Performed by: NURSE ANESTHETIST, CERTIFIED REGISTERED

## 2025-06-10 PROCEDURE — 88305 TISSUE EXAM BY PATHOLOGIST: CPT | Performed by: SURGERY

## 2025-06-10 PROCEDURE — 11970 RPLCMT TISS XPNDR PERM IMPLT: CPT | Mod: XU,50,, | Performed by: SURGERY

## 2025-06-10 DEVICE — IMPLANTABLE DEVICE: Type: IMPLANTABLE DEVICE | Site: BREAST | Status: FUNCTIONAL

## 2025-06-10 RX ORDER — GLYCOPYRROLATE 0.2 MG/ML
INJECTION INTRAMUSCULAR; INTRAVENOUS
Status: DISCONTINUED | OUTPATIENT
Start: 2025-06-10 | End: 2025-06-10

## 2025-06-10 RX ORDER — DEXMEDETOMIDINE HYDROCHLORIDE 100 UG/ML
INJECTION, SOLUTION INTRAVENOUS
Status: DISCONTINUED | OUTPATIENT
Start: 2025-06-10 | End: 2025-06-10

## 2025-06-10 RX ORDER — METHOCARBAMOL 100 MG/ML
1000 INJECTION, SOLUTION INTRAMUSCULAR; INTRAVENOUS ONCE
Status: DISCONTINUED | OUTPATIENT
Start: 2025-06-10 | End: 2025-06-10

## 2025-06-10 RX ORDER — CYCLOBENZAPRINE HCL 5 MG
5 TABLET ORAL
COMMUNITY
Start: 2025-03-15

## 2025-06-10 RX ORDER — MUPIROCIN 20 MG/G
OINTMENT TOPICAL 2 TIMES DAILY
OUTPATIENT
Start: 2025-06-10 | End: 2025-06-15

## 2025-06-10 RX ORDER — OXYCODONE AND ACETAMINOPHEN 5; 325 MG/1; MG/1
2 TABLET ORAL EVERY 4 HOURS PRN
Refills: 0 | Status: DISCONTINUED | OUTPATIENT
Start: 2025-06-10 | End: 2025-06-10 | Stop reason: HOSPADM

## 2025-06-10 RX ORDER — CEFAZOLIN SODIUM 1 G/3ML
2 INJECTION, POWDER, FOR SOLUTION INTRAMUSCULAR; INTRAVENOUS
OUTPATIENT
Start: 2025-06-10 | End: 2025-06-11

## 2025-06-10 RX ORDER — FENTANYL CITRATE 50 UG/ML
INJECTION, SOLUTION INTRAMUSCULAR; INTRAVENOUS
Status: DISCONTINUED | OUTPATIENT
Start: 2025-06-10 | End: 2025-06-10

## 2025-06-10 RX ORDER — LIDOCAINE HYDROCHLORIDE 20 MG/ML
INJECTION INTRAVENOUS
Status: DISCONTINUED | OUTPATIENT
Start: 2025-06-10 | End: 2025-06-10

## 2025-06-10 RX ORDER — PROCHLORPERAZINE EDISYLATE 5 MG/ML
5 INJECTION INTRAMUSCULAR; INTRAVENOUS EVERY 6 HOURS PRN
OUTPATIENT
Start: 2025-06-10

## 2025-06-10 RX ORDER — CYCLOBENZAPRINE HCL 10 MG
10 TABLET ORAL 3 TIMES DAILY PRN
OUTPATIENT
Start: 2025-06-10

## 2025-06-10 RX ORDER — ONDANSETRON HYDROCHLORIDE 2 MG/ML
INJECTION, SOLUTION INTRAVENOUS
Status: DISCONTINUED | OUTPATIENT
Start: 2025-06-10 | End: 2025-06-10

## 2025-06-10 RX ORDER — MIDAZOLAM HYDROCHLORIDE 2 MG/2ML
INJECTION, SOLUTION INTRAMUSCULAR; INTRAVENOUS
Status: COMPLETED
Start: 2025-06-10 | End: 2025-06-10

## 2025-06-10 RX ORDER — CEFAZOLIN SODIUM 1 G/3ML
INJECTION, POWDER, FOR SOLUTION INTRAMUSCULAR; INTRAVENOUS
Status: DISCONTINUED
Start: 2025-06-10 | End: 2025-06-10 | Stop reason: HOSPADM

## 2025-06-10 RX ORDER — GLUCAGON 1 MG
1 KIT INJECTION
Status: DISCONTINUED | OUTPATIENT
Start: 2025-06-10 | End: 2025-06-10 | Stop reason: HOSPADM

## 2025-06-10 RX ORDER — HYDROMORPHONE HYDROCHLORIDE 2 MG/ML
INJECTION, SOLUTION INTRAMUSCULAR; INTRAVENOUS; SUBCUTANEOUS
Status: DISCONTINUED | OUTPATIENT
Start: 2025-06-10 | End: 2025-06-10

## 2025-06-10 RX ORDER — DEXAMETHASONE SODIUM PHOSPHATE 4 MG/ML
INJECTION, SOLUTION INTRA-ARTICULAR; INTRALESIONAL; INTRAMUSCULAR; INTRAVENOUS; SOFT TISSUE
Status: DISCONTINUED | OUTPATIENT
Start: 2025-06-10 | End: 2025-06-10

## 2025-06-10 RX ORDER — PHENYLEPHRINE HYDROCHLORIDE 10 MG/ML
INJECTION INTRAVENOUS
Status: DISCONTINUED | OUTPATIENT
Start: 2025-06-10 | End: 2025-06-10

## 2025-06-10 RX ORDER — PROPOFOL 10 MG/ML
VIAL (ML) INTRAVENOUS
Status: DISCONTINUED | OUTPATIENT
Start: 2025-06-10 | End: 2025-06-10

## 2025-06-10 RX ORDER — HYDRALAZINE HYDROCHLORIDE 20 MG/ML
10 INJECTION INTRAMUSCULAR; INTRAVENOUS ONCE
Status: DISCONTINUED | OUTPATIENT
Start: 2025-06-10 | End: 2025-06-10 | Stop reason: HOSPADM

## 2025-06-10 RX ORDER — ONDANSETRON HYDROCHLORIDE 2 MG/ML
4 INJECTION, SOLUTION INTRAVENOUS EVERY 12 HOURS PRN
OUTPATIENT
Start: 2025-06-10

## 2025-06-10 RX ORDER — KETOROLAC TROMETHAMINE 30 MG/ML
15 INJECTION, SOLUTION INTRAMUSCULAR; INTRAVENOUS EVERY 8 HOURS PRN
Status: DISCONTINUED | OUTPATIENT
Start: 2025-06-10 | End: 2025-06-10 | Stop reason: HOSPADM

## 2025-06-10 RX ORDER — ROCURONIUM BROMIDE 10 MG/ML
INJECTION, SOLUTION INTRAVENOUS
Status: DISCONTINUED | OUTPATIENT
Start: 2025-06-10 | End: 2025-06-10

## 2025-06-10 RX ORDER — GENTAMICIN 40 MG/ML
INJECTION, SOLUTION INTRAMUSCULAR; INTRAVENOUS
Status: DISCONTINUED | OUTPATIENT
Start: 2025-06-10 | End: 2025-06-10 | Stop reason: HOSPADM

## 2025-06-10 RX ORDER — LIDOCAINE HYDROCHLORIDE 10 MG/ML
INJECTION, SOLUTION EPIDURAL; INFILTRATION; INTRACAUDAL; PERINEURAL
Status: DISCONTINUED | OUTPATIENT
Start: 2025-06-10 | End: 2025-06-10 | Stop reason: HOSPADM

## 2025-06-10 RX ORDER — MIDAZOLAM HYDROCHLORIDE 1 MG/ML
2 INJECTION INTRAMUSCULAR; INTRAVENOUS ONCE
Status: COMPLETED | OUTPATIENT
Start: 2025-06-10 | End: 2025-06-10

## 2025-06-10 RX ORDER — BUPIVACAINE 13.3 MG/ML
INJECTION, SUSPENSION, LIPOSOMAL INFILTRATION
Status: DISCONTINUED | OUTPATIENT
Start: 2025-06-10 | End: 2025-06-10 | Stop reason: HOSPADM

## 2025-06-10 RX ORDER — SODIUM CHLORIDE 9 MG/ML
INJECTION, SOLUTION INTRAVENOUS CONTINUOUS
OUTPATIENT
Start: 2025-06-10

## 2025-06-10 RX ORDER — METHOCARBAMOL 100 MG/ML
1000 INJECTION, SOLUTION INTRAMUSCULAR; INTRAVENOUS ONCE
Status: COMPLETED | OUTPATIENT
Start: 2025-06-10 | End: 2025-06-10

## 2025-06-10 RX ORDER — GENTAMICIN 40 MG/ML
INJECTION, SOLUTION INTRAMUSCULAR; INTRAVENOUS
Status: DISCONTINUED
Start: 2025-06-10 | End: 2025-06-10 | Stop reason: HOSPADM

## 2025-06-10 RX ORDER — CEFAZOLIN SODIUM 1 G/3ML
2 INJECTION, POWDER, FOR SOLUTION INTRAMUSCULAR; INTRAVENOUS
Status: COMPLETED | OUTPATIENT
Start: 2025-06-10 | End: 2025-06-10

## 2025-06-10 RX ORDER — MORPHINE SULFATE 4 MG/ML
2 INJECTION, SOLUTION INTRAMUSCULAR; INTRAVENOUS EVERY 4 HOURS PRN
Refills: 0 | OUTPATIENT
Start: 2025-06-10

## 2025-06-10 RX ORDER — BUPIVACAINE 13.3 MG/ML
INJECTION, SUSPENSION, LIPOSOMAL INFILTRATION
Status: DISCONTINUED
Start: 2025-06-10 | End: 2025-06-10 | Stop reason: HOSPADM

## 2025-06-10 RX ORDER — OXYCODONE AND ACETAMINOPHEN 5; 325 MG/1; MG/1
1 TABLET ORAL
Status: DISCONTINUED | OUTPATIENT
Start: 2025-06-10 | End: 2025-06-10 | Stop reason: HOSPADM

## 2025-06-10 RX ORDER — BUDESONIDE AND FORMOTEROL FUMARATE DIHYDRATE 160; 4.5 UG/1; UG/1
AEROSOL RESPIRATORY (INHALATION)
COMMUNITY
Start: 2025-04-09

## 2025-06-10 RX ORDER — ACETAMINOPHEN 10 MG/ML
INJECTION, SOLUTION INTRAVENOUS
Status: DISCONTINUED | OUTPATIENT
Start: 2025-06-10 | End: 2025-06-10

## 2025-06-10 RX ORDER — EPINEPHRINE 0.1 MG/ML
INJECTION INTRAVENOUS
Status: DISCONTINUED | OUTPATIENT
Start: 2025-06-10 | End: 2025-06-10 | Stop reason: HOSPADM

## 2025-06-10 RX ORDER — CEFAZOLIN SODIUM 1 G/3ML
INJECTION, POWDER, FOR SOLUTION INTRAMUSCULAR; INTRAVENOUS
Status: DISCONTINUED | OUTPATIENT
Start: 2025-06-10 | End: 2025-06-10 | Stop reason: HOSPADM

## 2025-06-10 RX ORDER — DEXTROAMPHETAMINE SACCHARATE, AMPHETAMINE ASPARTATE, DEXTROAMPHETAMINE SULFATE AND AMPHETAMINE SULFATE 7.5; 7.5; 7.5; 7.5 MG/1; MG/1; MG/1; MG/1
1 TABLET ORAL 2 TIMES DAILY
COMMUNITY
Start: 2025-03-14

## 2025-06-10 RX ORDER — HYDROMORPHONE HYDROCHLORIDE 2 MG/ML
0.2 INJECTION, SOLUTION INTRAMUSCULAR; INTRAVENOUS; SUBCUTANEOUS EVERY 5 MIN PRN
Status: DISCONTINUED | OUTPATIENT
Start: 2025-06-10 | End: 2025-06-10 | Stop reason: HOSPADM

## 2025-06-10 RX ORDER — EPINEPHRINE 1 MG/ML
INJECTION, SOLUTION, CONCENTRATE INTRAVENOUS
Status: DISCONTINUED
Start: 2025-06-10 | End: 2025-06-10 | Stop reason: HOSPADM

## 2025-06-10 RX ORDER — LIDOCAINE HYDROCHLORIDE 10 MG/ML
INJECTION, SOLUTION INFILTRATION; PERINEURAL
Status: DISCONTINUED
Start: 2025-06-10 | End: 2025-06-10 | Stop reason: HOSPADM

## 2025-06-10 RX ORDER — HALOPERIDOL LACTATE 5 MG/ML
0.5 INJECTION, SOLUTION INTRAMUSCULAR EVERY 10 MIN PRN
Status: DISCONTINUED | OUTPATIENT
Start: 2025-06-10 | End: 2025-06-10 | Stop reason: HOSPADM

## 2025-06-10 RX ADMIN — LIDOCAINE HYDROCHLORIDE 50 MG: 20 INJECTION INTRAVENOUS at 01:06

## 2025-06-10 RX ADMIN — DEXMEDETOMIDINE 10 MCG: 200 INJECTION, SOLUTION INTRAVENOUS at 12:06

## 2025-06-10 RX ADMIN — FENTANYL CITRATE 50 MCG: 50 INJECTION, SOLUTION INTRAMUSCULAR; INTRAVENOUS at 12:06

## 2025-06-10 RX ADMIN — HYDROMORPHONE HYDROCHLORIDE 1 MG: 2 INJECTION, SOLUTION INTRAMUSCULAR; INTRAVENOUS; SUBCUTANEOUS at 01:06

## 2025-06-10 RX ADMIN — ROCURONIUM BROMIDE 50 MG: 10 INJECTION, SOLUTION INTRAVENOUS at 12:06

## 2025-06-10 RX ADMIN — ONDANSETRON 4 MG: 2 INJECTION INTRAMUSCULAR; INTRAVENOUS at 01:06

## 2025-06-10 RX ADMIN — LIDOCAINE HYDROCHLORIDE 50 MG: 20 INJECTION INTRAVENOUS at 12:06

## 2025-06-10 RX ADMIN — CEFAZOLIN 2 G: 330 INJECTION, POWDER, FOR SOLUTION INTRAMUSCULAR; INTRAVENOUS at 12:06

## 2025-06-10 RX ADMIN — KETOROLAC TROMETHAMINE 15 MG: 30 INJECTION, SOLUTION INTRAMUSCULAR; INTRAVENOUS at 02:06

## 2025-06-10 RX ADMIN — SUGAMMADEX 200 MG: 100 INJECTION, SOLUTION INTRAVENOUS at 01:06

## 2025-06-10 RX ADMIN — PROPOFOL 200 MG: 10 INJECTION, EMULSION INTRAVENOUS at 12:06

## 2025-06-10 RX ADMIN — DEXAMETHASONE SODIUM PHOSPHATE 8 MG: 4 INJECTION, SOLUTION INTRA-ARTICULAR; INTRALESIONAL; INTRAMUSCULAR; INTRAVENOUS; SOFT TISSUE at 12:06

## 2025-06-10 RX ADMIN — MIDAZOLAM HYDROCHLORIDE 2 MG: 1 INJECTION, SOLUTION INTRAMUSCULAR; INTRAVENOUS at 12:06

## 2025-06-10 RX ADMIN — ROCURONIUM BROMIDE 20 MG: 10 INJECTION, SOLUTION INTRAVENOUS at 01:06

## 2025-06-10 RX ADMIN — ACETAMINOPHEN 1000 MG: 10 INJECTION, SOLUTION INTRAVENOUS at 12:06

## 2025-06-10 RX ADMIN — METHOCARBAMOL 1000 MG: 100 INJECTION INTRAMUSCULAR; INTRAVENOUS at 02:06

## 2025-06-10 RX ADMIN — MIDAZOLAM HYDROCHLORIDE 2 MG: 1 INJECTION INTRAMUSCULAR; INTRAVENOUS at 12:06

## 2025-06-10 RX ADMIN — PHENYLEPHRINE HYDROCHLORIDE 100 MCG: 10 INJECTION INTRAVENOUS at 01:06

## 2025-06-10 RX ADMIN — SODIUM CHLORIDE, POTASSIUM CHLORIDE, SODIUM LACTATE AND CALCIUM CHLORIDE: 600; 310; 30; 20 INJECTION, SOLUTION INTRAVENOUS at 12:06

## 2025-06-10 RX ADMIN — GLYCOPYRROLATE 0.2 MG: 0.2 INJECTION INTRAMUSCULAR; INTRAVENOUS at 12:06

## 2025-06-10 NOTE — ANESTHESIA PREPROCEDURE EVALUATION
"                                                                                                             06/10/2025  Thom Cordoba is a 40 y.o., female who is s/p bilateral mastectomies for CA at the end of last year. She did well with that anesthetic, and she is here today to have implants placed and removal of tissue expanders. She is feeling great and very ready to proceed.     Height: 5' 6" (1.676 m) (06/02/25)   Weight: 99.2 kg (218 lb 11.1 oz) (06/10/25)   BMI: 35.30   NPO Status: 2000   Allergies: No Known Allergies     Pre Vitals  Current as of 06/10/25 1025  BP: 112/74 Pulse: 61   Resp: 18 SpO2: 99   Temp: 36.8 °C (98.2 °F)     Past Medical History   Breast cancer GERD (gastroesophageal reflux disease)   Patient-requested procedure ADHD     Surgical History    TUBAL LIGATION ABLATION   MEDIPORT INSERTION, DOUBLE MASTECTOMY   HYSTERECTOMY BREAST RECONSTRUCTION   INSERTION OF BREAST TISSUE EXPANDER INSERTION, BIOLOGIC IMPLANT, FOR SOFT TISSUE REINFORCEMENT   INJECTION, AGENT, INTRAVENOUS, FOR ASSESSMENT OF BLOOD FLOW IN FLAP OR GRAFT      Prescription Medications  Within last 14 days from 06/10/25   Last Taken Last Updated   boric acid (PH-D) 600 mg vaginal suppository    Past Month 06/10/25 0945   budesonide-formoterol 160-4.5 mcg (SYMBICORT) 160-4.5 mcg/actuation HFAA    Past Week 06/10/25 0947   cyclobenzaprine (FLEXERIL) 5 MG tablet    5/27/2025 06/10/25 0947   dextroamphetamine-amphetamine 30 mg Tab    More than a month 06/10/25 0947   hydrOXYzine pamoate (VISTARIL) 25 MG Cap    More than a month 06/10/25 0945   LINZESS 290 mcg Cap capsule    Past Week 06/10/25 0945   OLANZapine (ZYPREXA) 10 MG tablet    More than a month 11/19/24 0559   predniSONE (DELTASONE) 20 MG tablet    More than a month 11/19/24 0559   promethazine (PHENERGAN) 12.5 MG Tab    More than a month 11/19/24 0559   promethazine-dextromethorphan (PROMETHAZINE-DM) 6.25-15 mg/5 mL Syrp    More than a month 11/19/24 0559   traMADoL (ULTRAM) " 50 mg tablet    More than a month 11/19/24 0559   TTE 1/27/23   EF 60%    Pre-op Assessment    I have reviewed the Patient Summary Reports.     I have reviewed the Nursing Notes. I have reviewed the NPO Status.   I have reviewed the Medications.     Review of Systems  Anesthesia Hx:  No problems with previous Anesthesia   History of prior surgery of interest to airway management or planning:  Previous anesthesia: General, MAC        Denies Family Hx of Anesthesia complications.    Denies Personal Hx of Anesthesia complications.                    Social:  Non-Smoker, Social Alcohol Use       Hematology/Oncology:    Oncology Normal    -- Denies Anemia:                                  EENT/Dental:  EENT/Dental Normal           Cardiovascular:  Exercise tolerance: good    Denies Hypertension.       Denies Angina.                                    Pulmonary:     Denies Asthma.   Denies Shortness of breath.   Denies Sleep Apnea.                Renal/:   Denies Chronic Renal Disease.                Hepatic/GI:     GERD, well controlled    Not Taking GLP-1 Agonists     Gerd          Musculoskeletal:  Musculoskeletal Normal                Neurological:    Denies CVA.                                    Endocrine:  Denies Diabetes.         Obesity / BMI > 30  Dermatological:  Skin Normal    Psych:  Psychiatric History                  Physical Exam  General: Well nourished, Cooperative, Alert and Oriented    Airway:  Mallampati: I   Mouth Opening: Normal  TM Distance: Normal  Tongue: Normal  Neck ROM: Normal ROM    Dental:  Intact    Chest/Lungs:  Clear to auscultation, Normal Respiratory Rate    Heart:  Rate: Normal  Rhythm: Regular Rhythm  Sounds: Normal    Abdomen:  Normal, Soft, Nontender        Anesthesia Plan  Type of Anesthesia, risks & benefits discussed:    Anesthesia Type: Gen Supraglottic Airway, Gen ETT  Intra-op Monitoring Plan: Standard ASA Monitors  Post Op Pain Control Plan: IV/PO Opioids PRN and  multimodal analgesia  Induction:  IV  Airway Plan: Direct, Post-Induction  Informed Consent: Informed consent signed with the Patient and all parties understand the risks and agree with anesthesia plan.  All questions answered.   ASA Score: 2  Day of Surgery Review of History & Physical: H&P Update referred to the surgeon/provider.    Ready For Surgery From Anesthesia Perspective.     .

## 2025-06-10 NOTE — TRANSFER OF CARE
"Anesthesia Transfer of Care Note    Patient: Apral Cordoba    Procedure(s) Performed: Procedure(s) (LRB):  INSERTION, BREAST IMPLANT (bilateral breast removal of tissue expanders and placement of silicone implants) (Bilateral)  LIPOSUCTION (possible bilateral axillary liposuction and removal of redundant skin and subcutaneous tissue) (Bilateral)  CAPSULOTOMY, BREAST (Bilateral)    Patient location: PACU    Anesthesia Type: general    Transport from OR: Transported from OR on room air with adequate spontaneous ventilation    Post pain: adequate analgesia    Post assessment: no apparent anesthetic complications and tolerated procedure well    Post vital signs: stable    Level of consciousness: responds to stimulation    Nausea/Vomiting: no nausea/vomiting    Complications: none    Transfer of care protocol was followed      Last vitals: Visit Vitals  /74   Pulse 61   Temp 36.8 °C (98.2 °F)   Resp 18   Ht 5' 6" (1.676 m)   Wt 99.2 kg (218 lb 11.1 oz)   LMP  (LMP Unknown)   SpO2 99%   Breastfeeding No   BMI 35.30 kg/m²     "

## 2025-06-10 NOTE — DISCHARGE SUMMARY
[facility] Surgical - Periop Services  Discharge Note  Short Stay    Procedure(s) (LRB):  INSERTION, BREAST IMPLANT (bilateral breast removal of tissue expanders and placement of silicone implants) (Bilateral)  LIPOSUCTION (possible bilateral axillary liposuction and removal of redundant skin and subcutaneous tissue) (Bilateral)  CAPSULOTOMY, BREAST (Bilateral)      OUTCOME: Patient tolerated treatment/procedure well without complication and is now ready for discharge.    DISPOSITION: Home or Self Care    FINAL DIAGNOSIS:  <principal problem not specified>    FOLLOWUP: In clinic    DISCHARGE INSTRUCTIONS:  No discharge procedures on file.     TIME SPENT ON DISCHARGE: 5 minutes

## 2025-06-10 NOTE — ANESTHESIA PROCEDURE NOTES
Intubation    Date/Time: 6/10/2025 12:38 PM    Performed by: Hannah Carrillo CRNA  Authorized by: Agustin Rooney MD    Intubation:     Induction:  Intravenous    Intubated:  Postinduction    Mask Ventilation:  Easy mask    Attempts:  1    Attempted By:  CRNA    Method of Intubation:  Direct    Blade:  Mcallister 2    Laryngeal View Grade: Grade I - full view of cords      Difficult Airway Encountered?: No      Complications:  None    Airway Device:  Oral endotracheal tube    Airway Device Size:  7.0    Style/Cuff Inflation:  Cuffed (inflated to minimal occlusive pressure)    Inflation Amount (mL):  7    Tube secured:  22    Secured at:  The lips    Placement Verified By:  Capnometry    Complicating Factors:  None    Findings Post-Intubation:  BS equal bilateral and atraumatic/condition of teeth unchanged

## 2025-06-10 NOTE — DISCHARGE INSTRUCTIONS
Patient Education       Breast Augmentation Discharge Instructions   About this topic Breast augmentation is a surgery to place an implant to change the size or shape of your breast. Some people have breast augmentation if they feel their breasts are too small. Others may think their breasts are not in proportion with the rest of their body. Some people have breast augmentation after they have had cancer surgery of the breast.       What care is needed at home?   Take all your medications as ordered by your doctor.  Sleep with your head and chest elevated on 2 to 3 pillows to reduce swelling.  Incision care.  Be sure to wash your hands before and after touching your wound or dressing.  Do Not Remove your dressing(s) keep them clean dry and intact until your office visit on Monday.  Avoid using creams, lotions, or oils on your incision until your skin has healed. This will help to lower your risk of infection.  Ask your doctor about the best kind of bra for you to wear. Wear surgical/supportive bra at all times after procedure until your doctor say otherwise.   Tops that button up the front are easier to put on and take off than those that slip over your head.  Your bowel movements may take some time to get back to normal. Eat small meals high in fiber to avoid hard stools. Drink 6 to 8 glasses of water each day.  Try to walk each day. Start by walking a little more than you did the day before. Walking boosts blood flow and helps prevent lung, belly, and blood problems.  Avoid raising your arms above your head.  Do not lift, pull, or push anything over 10 pounds (4.5 kg).    Ask the doctor when you may go back to your normal activities like work or driving.  What follow-up care is needed?   Be sure to keep your follow up visit.  Any drains placed in your breasts will be removed 2 to 4 days after surgery.  If you have stitches or staples, you will need to have them taken out. Your doctor will often want to do this in  1 to 2 weeks.   If you had silicone gel implants, your doctor will check them every few years using ultrasound or MRI.   Your breasts implants may need to be replaced over time.  Will physical activity be limited?   You will need to limit your activity for a while. Avoid lifting, sports, and sex until your doctor says it is ok to do these things. Talk with your doctor about the right amount of activity for you.  What problems could happen?   Bleeding  Infection  Scarring or wrinkled skin over the implant  Implant may make cancer harder to detect  Breast sensation may not be the same as your natural breast  Leakage of implants  Problem with breastfeeding  When do I need to call the doctor?   Signs of infection. These include a fever of 100.4°F (38°C) or higher, chills, wound that will not heal.  Signs of wound infection. These include swelling, redness, warmth around the wound; too much pain when touched; yellowish, greenish, or bloody discharge; foul smell coming from the cut site; cut site opens up.  Cough, shortness of breath, chest pain  Upset stomach and throwing up that are not helped by pain drugs     Patient Education       Liposuction     What happens after the procedure?   You may feel pain. Your doctor will give you drugs for this.  There will be swelling around the suctioned area. Your doctor will give you a compressive garment to wear over the area for support. You may need to wear this for 4 weeks or more.  There may also be bruising after the suctioned area. This is normal and will go away on its own after a few days.  Your doctor may leave your cuts open to drain liquid.  What care is needed at home?   There may be bruising around the suctioned area.  The area may be numb or itchy. This will get better.  If the treatment was on your thighs or arms, raise them above the level of the heart when you sit or lie down.  Do not remove your bandaids. Keep clean, dry, and intact at all times. If you have  dermabond (surgical glue), do not scrub off or try to remove. It will fall off within 1-2 weeks.   Sponge bathe only. You may take a shower when released by your doctor.    Do not lift anything over 10 pounds.  Ask your doctor when you may go back to your normal activities like work or driving.  Be sure to wash your hands before and after touching your wound or dressing.  When bathing, remove the compression garment and bandage. Allow soapy water to run down over your wound and then pat dry. Do not scrub or rub at the wound. Replace the bandage with a clean one.  What follow-up care is needed?   Be sure to keep your follow up visit.  If you have staples, you will need to have them taken out. Your doctor will often want to do this in 2 to 3 weeks.  If you plan to have liposuction on other body areas, you will have to wait 3 to 4 weeks.  Talk to your doctor about creams that may stop scarring.  What lifestyle changes are needed?   Exercise. This will help firm the loose skin. Do this when the area is fully healed and when your doctor says it is safe to do so.  Talk to your doctor about the right amount of activity for you.  Avoid activities where you can easily get bumped. Ask your doctor about when and what type of exercise will be good for you to start.  What problems could happen?   Infection  Scarring  Outcome is not as successful as expected  Poor wound healing  Need for liposuction again to achieve the desired look  When do I need to call the doctor?   Signs of infection. These include a fever of 100.4°F (38°C) or higher, chills, wound that will not heal.  Signs of wound infection. These include swelling, redness, warmth around the wound; too much pain when touched; yellowish, greenish, or bloody discharge; foul smell coming from the cut site; cut site opens up.  Too much blood from the drain site  Very bad pain in the area even after you take the drugs for pain  Trouble breathing or chest pain  Very bad upset  stomach and throwing up    appears normal and intact

## 2025-06-11 VITALS
SYSTOLIC BLOOD PRESSURE: 108 MMHG | RESPIRATION RATE: 10 BRPM | OXYGEN SATURATION: 98 % | HEIGHT: 66 IN | HEART RATE: 63 BPM | DIASTOLIC BLOOD PRESSURE: 64 MMHG | BODY MASS INDEX: 35.15 KG/M2 | WEIGHT: 218.69 LBS | TEMPERATURE: 98 F

## 2025-06-11 LAB — PSYCHE PATHOLOGY RESULT: NORMAL

## 2025-06-11 NOTE — ANESTHESIA POSTPROCEDURE EVALUATION
Anesthesia Post Evaluation    Patient: Apral Cordoba    Procedure(s) Performed: Procedure(s) (LRB):  INSERTION, BREAST IMPLANT (bilateral breast removal of tissue expanders and placement of silicone implants) (Bilateral)  LIPOSUCTION (possible bilateral axillary liposuction and removal of redundant skin and subcutaneous tissue) (Bilateral)  CAPSULOTOMY, BREAST (Bilateral)  REMOVAL, TISSUE EXPANDER (Bilateral)    Final Anesthesia Type: general      Patient location during evaluation: PACU  Patient participation: Yes- Able to Participate  Level of consciousness: awake and alert and oriented  Post-procedure vital signs: reviewed and stable  Pain management: adequate  Airway patency: patent    PONV status at discharge: No PONV  Anesthetic complications: no      Cardiovascular status: hemodynamically stable  Respiratory status: unassisted, spontaneous ventilation and room air  Hydration status: euvolemic  Follow-up not needed.              Vitals Value Taken Time   /64 06/10/25 17:30   Temp 36.8 °C (98.3 °F) 06/10/25 17:00   Pulse 63 06/10/25 17:30   Resp 18 06/10/25 14:55   SpO2 98 % 06/10/25 17:30   Vitals shown include unfiled device data.      Event Time   Out of Recovery 14:59:00         Pain/Lawanda Score: Pain Rating Prior to Med Admin: 4 (6/10/2025  2:27 PM)  Lawanda Score: 10 (6/10/2025  4:00 PM)  Modified Lawanda Score: 20 (6/10/2025  5:00 PM)

## 2025-06-11 NOTE — OP NOTE
[facility] [address] [place] [address]    PATIENT NAME:      DASH APRAL   YOB: 1985  CSN:               [id number]  MRN:               [id number]  ADMIT DATE:        06/10/2025 09:27:00  PHYSICIAN:         Jorge Gutierrez MD                          OPERATIVE REPORT      DATE OF SURGERY:    06/10/2025 00:00:00    SURGEON:  Jorge Gutierrez MD    ASSISTANT:  Jing Chen, nurse practitioner, who was essential for deep   retraction, holding in place the implants, assistance in closure.  There was no   qualified resident available.    PREOPERATIVE DIAGNOSIS:  History of right breast cancer.    POSTOPERATIVE DIAGNOSIS:  History of right breast cancer.    PROCEDURES:    1. Removal of bilateral breast tissue expanders.  2. Bilateral breast capsulotomy.  3. Bilateral axillary liposuction.    INDICATION FOR PROCEDURE:  This is a 40-year-old female.  She has a history of   breast cancer.  She is status post breast reconstruction and right-sided   latissimus flap and tissue expander, also left breast reconstruction and   placement of the tissue expander after bilateral mastectomy and she now presents   for 2nd stage reconstruction, placement of permanent implants as well as   liposuction, excess adipose tissue in axillary area.    DESCRIPTION OF PROCEDURE:  The patient was identified in the preoperative   holding area.  She was marked.  Informed consent was reviewed.  She was taken to   the operating room, placed in supine position, general endotracheal anesthesia   was provided.  She was prepped and draped in a sterile surgical fashion.    Time-out was taken.  Everyone in agreement.  We started the procedure.  I made a   small elliptical incision around the midportion of her previous left mastectomy   scar.  Skin and subcutaneous tissues removed, passed off as specimen.  Further   dissection was achieved  with electrocautery.  The breast implant capsule was   identified and entered.  We removed the intact tissue expander.  The capsule   appeared to be very normal.  AlloDerm had completely incorporated.  The patient   required medial capsulotomy.  This was performed by electrocautery.  Hemostasis   was ensured.  Exparel was injected throughout the field.  We then placed various   breast implant sizers and felt the 800 cc sizer fit her appropriately.  We then   temporarily tacked the incision, closed with staples, leaving the sizer in   place and then went over to the patient's right breast.  We made a small   incision in the midportion of the latissimus skin flap on the superior aspect of   it.  The skin was incised with a scalpel and further dissection was achieved   with electrocautery.  A portion of the skin was sent off as a specimen.  Further   dissection was achieved with electrocautery down to the breast implant capsule.    Intact tissue expander was removed.  The patient required medial and superior   capsulotomy using electrocautery.  Hemostasis was ensured.  Exparel was injected   throughout the field.  We then placed various breast implant sizers and felt   that the 750 cc implant sizer when evaluated the patient in upright and supine   positions gave her the best symmetry with the left breast.  We then made a stab   incision using a previous drain port sites and through this on the right and   left sides we infiltrated 500 cc of tumescent solution on the right and 500 cc   of tumescent solution on the left axillary areas.  After allowing time for   vasoconstriction, we performed power-assisted liposuction approximately 1 L of   Lipoaspirate was removed.  These incisions were then closed with 4-0 plain gut   suture.  Attention was then paid to the removing the sizers.  We copiously   irrigated both pockets out with Betadine and Tran solution, ensured hemostasis,   re-prepped the patient, placed sterile  blue towels around the incisions,   changed our gloves and then used a Olmos funnel after soaking implants and   Betadine and Tran solution, 800 cc on the left and 750 cc Allergan implant in   each pocket.  We then performed a 3-layer closure.  Capsular tissue was then   closed with 0 Vicryl sutures and then the deep dermis was also closed with 0   Vicryl sutures and then 2-0 subcuticular Stratafix.  Dermabond tape was used as   dressing.  The patient tolerated the procedure very well.  There were no   complications.  All counts correct.        ______________________________  MD ELIZ Benavides/AQS  DD:  06/10/2025  Time:  06:32PM  DT:  06/10/2025  Time:  09:37PM  Job #:  [id number]      OPERATIVE REPORT

## 2025-06-16 ENCOUNTER — HOSPITAL ENCOUNTER (EMERGENCY)
Facility: HOSPITAL | Age: 40
Discharge: LEFT BEFORE DISCHARGE | End: 2025-06-16
Payer: MEDICAID

## 2025-06-16 VITALS
DIASTOLIC BLOOD PRESSURE: 67 MMHG | HEART RATE: 93 BPM | OXYGEN SATURATION: 100 % | HEIGHT: 66 IN | BODY MASS INDEX: 35.36 KG/M2 | TEMPERATURE: 98 F | WEIGHT: 220 LBS | SYSTOLIC BLOOD PRESSURE: 106 MMHG | RESPIRATION RATE: 18 BRPM

## 2025-06-16 PROCEDURE — 99284 EMERGENCY DEPT VISIT MOD MDM: CPT | Mod: 25

## 2025-06-16 RX ORDER — KETOROLAC TROMETHAMINE 30 MG/ML
30 INJECTION, SOLUTION INTRAMUSCULAR; INTRAVENOUS
Status: DISCONTINUED | OUTPATIENT
Start: 2025-06-16 | End: 2025-06-16 | Stop reason: HOSPADM

## 2025-06-16 RX ORDER — DEXAMETHASONE SODIUM PHOSPHATE 4 MG/ML
8 INJECTION, SOLUTION INTRA-ARTICULAR; INTRALESIONAL; INTRAMUSCULAR; INTRAVENOUS; SOFT TISSUE
Status: DISCONTINUED | OUTPATIENT
Start: 2025-06-16 | End: 2025-06-16 | Stop reason: HOSPADM

## 2025-06-16 NOTE — FIRST PROVIDER EVALUATION
"Medical screening examination initiated.  I have conducted a focused provider triage encounter, findings are as follows:    Brief history of present illness:  arrived to ED due to ongoing headache for 2 weeks. Denies vision changes. Reports mild nausea.     Vitals:    06/16/25 1405   BP: 106/67   Pulse: 93   Resp: 18   Temp: 98.1 °F (36.7 °C)   TempSrc: Oral   SpO2: 100%   Weight: 99.8 kg (220 lb)   Height: 5' 6" (1.676 m)       Pertinent physical exam:  awake, alert, has non-labored breathing, ambulatory.    Brief workup plan:  imaging, medication    Preliminary workup initiated; this workup will be continued and followed by the physician or advanced practice provider that is assigned to the patient when roomed.  "

## 2025-06-16 NOTE — ED NOTES
Pt called back to inform she had left the ER. Educated pt to seek medical attention if symptoms worsen or make appt to see PCP. Pt stated understanding.

## 2025-07-23 ENCOUNTER — OFFICE VISIT (OUTPATIENT)
Dept: HEMATOLOGY/ONCOLOGY | Facility: CLINIC | Age: 40
End: 2025-07-23
Payer: MEDICAID

## 2025-07-23 VITALS
SYSTOLIC BLOOD PRESSURE: 125 MMHG | HEIGHT: 66 IN | RESPIRATION RATE: 12 BRPM | BODY MASS INDEX: 35.57 KG/M2 | DIASTOLIC BLOOD PRESSURE: 74 MMHG | OXYGEN SATURATION: 100 % | HEART RATE: 65 BPM | WEIGHT: 221.31 LBS | TEMPERATURE: 98 F

## 2025-07-23 DIAGNOSIS — C50.411 MALIGNANT NEOPLASM OF UPPER-OUTER QUADRANT OF RIGHT BREAST IN FEMALE, ESTROGEN RECEPTOR NEGATIVE: Primary | ICD-10-CM

## 2025-07-23 DIAGNOSIS — Z17.1 MALIGNANT NEOPLASM OF UPPER-OUTER QUADRANT OF RIGHT BREAST IN FEMALE, ESTROGEN RECEPTOR NEGATIVE: Primary | ICD-10-CM

## 2025-07-23 PROCEDURE — 3074F SYST BP LT 130 MM HG: CPT | Mod: CPTII,,,

## 2025-07-23 PROCEDURE — 3008F BODY MASS INDEX DOCD: CPT | Mod: CPTII,,,

## 2025-07-23 PROCEDURE — 3078F DIAST BP <80 MM HG: CPT | Mod: CPTII,,,

## 2025-07-23 PROCEDURE — 99214 OFFICE O/P EST MOD 30 MIN: CPT | Mod: ,,,

## 2025-07-23 NOTE — PROGRESS NOTES
Referring provider:  Dr. Burgess  Reason for consultation:  Triple negative right breast cancer     Diagnosis:     Right breast invasive ductal carcinoma, triple negative  ER negative, OR negative, HER2 IHC 0, Ki-67 85%   [id number], stage IIIC     Treatment history:    02/02/2023-7/26/23:  Weekly Carbo Taxol plus q. 3 weeks pembrolizumab followed by q. 3 weeks AC plus pembro   09/12/2023 - Bilateral mastectomy  10/24/23-11/29/23: Adjuvant radiation therapy    Current Treatment:    12/18/2023- 06/19/24: Pembrolizumab      HPI  Patient is a 37-year-old with no significant medical history who noticed a mass right breast in November 2022 which led to further workup including mammogram, ultrasound and biopsy, pathology from biopsy in January 2023 revealed triple negative breast cancer, grade 3.  Patient had further workup including a PET-CT with Dr. Burgess not reveal any distant metastatic disease.  She presented to our clinic on 01/23/2023 to establish care for further disease management.      Patient denies any history of smoking, alcohol use or recreational drug use.  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  She has an ECOG of 0.  She has a mother and sister accompanied to her initial visit.  Family history of lung cancer in her grandfather and liver cancer in 1 of her uncles.  No history of breast cancer in the family.    Interval History:     Today, 07/23/25, patient is doing well s/p breast reconstruction since last follow-up. She has follow-up with Dr. Gutierrez in 3 weeks. She does report BLE edema for the last several months. She has an upcoming appt with her PCP for management and evaluation of edema.     Pathology  01/03/2023 right breast core biopsy and right axillary lymph node core biopsy:  Invasive ductal carcinoma, grade 3, right axillary lymph node biopsy with fragments of poorly differentiated malignant tumor consistent with ductal carcinoma.  Normal lymph  node identified.  ER negative, PA negative, HER2 IHC 0, Ki-67 85.3%.    23 Right Breast radical mastectomy- Right breast found to have no residual invasive or in situ carcinoma. Benign breast Parenchyma with changes consistent with previous biopsy, fibrosis and ductal hyperplasia without atypia. All margins negative for invasive and in situ carcinoma. 3 lymph nodes excised and found to be negative for metastatic carcinoma.     Imagin2024 CT chest PE protocol:  No PE.  Heterogeneous ground-glass and consolidative opacities in the anterior right lung in the upper middle and lower lobes.  This may reflect posttreatment change but recommend clinical correlation pneumonia.  Neoplastic disease also can not be excluded.  Comparison with prior exam is recommended if available.    24 Abdominal US: Unremarkable findings  24 CT Chest w/ contrast: findings consistent with post radiation changes in the right upper lobe, however, there are patchy areas of groundglass opacity in this region, which are nonspecific and may need clinical findings to exclude acute infiltrate/pneumonia. Otherwise no evidence of new malignancy or metastatic disease.  2023 CT abdomen pelvis with contrast:  No evidence of neoplastic disease, small umbilical hernia.    2022 echo EF 60%     01/10/2023 PET-CT:  Hypermetabolic mass in the superior right breast in keeping with the known cancer popliteal additional areas of mild-to-moderate uptake in the superior and retroareolar right breast are understood with definite discrete mass on CT but worrisome for infiltrative breast cancer.  Multiple hypermetabolic right axillary subpectoral and right internal mammary lymph nodes consistent with metastatic disease.  No other suspicious uptake.  Right breast mass measures 3 by 2.8 cm with an SUV of 13.7.  Inferior to the mass there was mild diffuse uptake associated with non-mass like dense breast tissue with an SUV of 4.0.   There is also abnormal uptake in the retroareolar right breast with definitive discrete mass can not be  from the dense glandular tissue with an SUV of 5.7 and measuring 1.5 x 1.1 cm.  These are concerning for infiltrative malignancies.    Laboratory   09/08/24: cr 1.31, LFTs WNL, TSH 0.486, cortisol 5.8, wbc 7.00,. hgb 11.1, plt 228  06/19/2024 CMP unremarkable, TSH 1.1323, cortisol 15.7, WBC count 7.63, hemoglobin 11.9, MCV 92.1, platelet count 276, ANC 5.18.    06/04/2024 CMP unremarkable, TSH 0.8, cortisol 14.5, WBC count 9.4, hemoglobin 12.3, MCV 92.1, platelet count 2 1 3, ANC 7.1.  05/14/24: CMP WNL, TSH 0.88, cortisol 5.5, wbc 6.99, hgb 11.3, plt 238, ANC 4.84  4/21/24 cr 0.88, TSH 0.6571, WBC 10.61, HGB 11.3, , ANC 8.19  3/30/2024:  Creatinine 0.89, liver enzymes WNL, T. Bili WNL, WBC 6.19, Hgb 11.6, Hct 34.8, Plt 225,000, TSH 0.99, cortisol 14.5.  03/10/24: CMP WNL, TSH 0.58, cortisol 13.3, wbc 7.25, hgb 11.0, plt 208, ANC 5.06  02/15/24 creatinine 0.74, albumin 2.6, LFTs unremarkable, WBC count 7.7, hemoglobin 14.3, .6, platelet count 380, ANC 6.5.    1/28/24: CMP and CBC WNL TSH 1.6687, Cortisol 9.6  01/07/2024: CMP WNL, TSH 0.8660, cortisol 7.7, wbc 5.20, hgb 11.7, plt 197, ANC 2.89  12/17/23: CMP unremarkable, TSH 0.514, cortisol 11.9, WBC 5.89, Hb 12.1, , ANC 3.59    12/04/2023 CMP unremarkable, WBC count 3.46, hemoglobin 11.6, platelet count 2 1 1.  TSH 1.9.  11/13/23: cr 0.78, alb 3.7, ca 9.3, LFTs WNL, TSH 1.5, wbc 8, hgb 11.1, plt 227, ANC 6.08  10/09/23: cr 0.82, alb 3.2, ca 8.9, LFTs WNL, TSH 1.5, wbc 6.93, hgb 9.8, plt 258, ANC 3.10  07/25/23: CMP WNL, wbc 3.83, hgb 9.4, plt 297, ANC 1.55, mag 1.8, phosphorus 4.2  07/04/2023: CMP unremarkable, TSH 0.50, cortisol 8.6, wbc 5.83, hgb 9.7, plt 307, ANC 3.57  06/12/2023:  Creatinine 0.87, albumin 3.7, LFTs within normal limits, calcium 9.5, magnesium 1.8, phosphorus 4.2, TSH 0.33, WBC count 3.9, hemoglobin 9.6, MCV 95,  platelet count 327, ANC 1.87.   05/22/23 cr 0.81, alb 3.8, ca 9.6, LFTs WNL, mag 1.8, phosphorus 3.8, wbc 6.99, hgb 9.6, plt 297, ANC 3.95  05/01/23: Cr 0.85, alb 3.8, ca 9.3, AST 45, ALT 70, mag 1.7, phosphorus 3.6, wbc 4.85, hgb 10.2, plt 197, ANC 2.20  04/24/2023:  Creatinine 0.8, albumin 3.6, alkaline phosphatase 77, total bili 0.4, AST 39, ALT 87, magnesium 1.7, phosphorus 3.3, WBC count 9.6, ANC 4.9, platelet count 196, hemoglobin 9.6.  3/23/23: cr 0.79 , Mg 1.7 TSH 1.8  AST 32  wbc 7.92 hgb 10.0 Plt 215  03/15/23: cr 0.81, TB 0.7, mag 2.0, TSH 0.7533, cortisol 17.9, , , wbc 4.62, hgb 11.1, plt 238, ANC 1.89, UPT neg  03/08/2023:  Creatinine 0.83, AST 73, , bilirubin 0.5, TSH 0.75, WBC 5.7, hemoglobin 11, platelets 222  03/02/2023:  WBC count 5.17, hemoglobin 10.2, MCV 91.1, platelet count 265, ANC 1.89, urine pregnancy negative, creatinine 0.9, albu3.5, ca 9.2, alkaline phosphatase 76, total bilirubin 0.3, AST 53, , magnesium 1.8, phosphorus 2.7, TSH 0.9.  02/22/2023: WBC 3.02, Hgb 10.9, MCV 88.0, , ANC 1.4 3, UPT negative, creatinine 0.82 albumin 3.7 magnesium 2.1 phosphorus 2.1, TSH 0.6540, cortisol 8.7  02/15/23 Cr 0.83, alb 3.7, LFTs WNL, mag 1.7, phosphorus 2.0, TSH 0.4434, cortisol 10.6, wbc 3.32, hgb 11.3, plt 266, ANC 1.42 UPT negative   02/08/23: cr 0.79, alb 3.5, ca 9.3, AST 36, ALT 45, mag 1.9, phosphorus 2.1, wbc 4.47, hgb 11.1, plt 264, ANC 2.28  02/01/2023:  Creatinine 0.83, albumin 3.7, LFTs within normal limits, TSH 1.09, cortisol 8.8, free T4 0.98, WBC 7.7, Hb 11.5, MCV 87.9, platelet count 283, ANC 4.09, urine pregnancy negative.    Past Medical History:   Diagnosis Date    ADHD     Breast cancer     GERD (gastroesophageal reflux disease)     Patient-requested procedure      Past Surgical History:   Procedure Laterality Date    ABLATION      uterine    BREAST CAPSULOTOMY Bilateral 6/10/2025    Procedure: CAPSULOTOMY, BREAST;  Surgeon: Jorge Gutierrez  MD;  Location: [facility];  Service: Plastics;  Laterality: Bilateral;    BREAST RECONSTRUCTION Right 11/19/2024    Procedure: RECONSTRUCTION, BREAST Right breast recon with latissimus dorsi myoccutaneous flap);  Surgeon: Jorge Gutierrez MD;  Location: [facility] OR;  Service: Plastics;  Laterality: Right;    HYSTERECTOMY      INJECTION, AGENT, INTRAVENOUS, FOR ASSESSMENT OF BLOOD FLOW IN FLAP OR GRAFT Bilateral 11/19/2024    Procedure: INJECTION, AGENT, INTRAVENOUS, FOR ASSESSMENT OF BLOOD FLOW IN FLAP OR GRAFT (Use of intraoperative fluoroscein angiography);  Surgeon: Jorge Gutierrez MD;  Location: [facility];  Service: Plastics;  Laterality: Bilateral;    INSERTION OF BREAST IMPLANT Bilateral 6/10/2025    Procedure: INSERTION, BREAST IMPLANT (bilateral breast removal of tissue expanders and placement of silicone implants);  Surgeon: Jorge Gutierrez MD;  Location: [facility];  Service: Plastics;  Laterality: Bilateral;    INSERTION OF BREAST TISSUE EXPANDER Bilateral 11/19/2024    Procedure: INSERTION, TISSUE EXPANDER, BREAST (right breast placement of tissue expander, left breast recon with tissue expander);  Surgeon: Jorge Gutierrez MD;  Location: [facility];  Service: Plastics;  Laterality: Bilateral;    INSERTION, BIOLOGIC IMPLANT, FOR SOFT TISSUE REINFORCEMENT Bilateral 11/19/2024    Procedure: INSERTION, BIOLOGIC IMPLANT, FOR SOFT TISSUE REINFORCEMENT (bilateral breast placement of acelullar dermal matrix);  Surgeon: Jorge Gutierrez MD;  Location: [facility];  Service: Plastics;  Laterality: Bilateral;    LIPOSUCTION Bilateral 6/10/2025    Procedure: LIPOSUCTION (possible bilateral axillary liposuction and removal of redundant skin and subcutaneous tissue);  Surgeon: Jorge Gutierrez MD;  Location: [facility];  Service: Plastics;  Laterality: Bilateral;    MASTECTOMY Bilateral     right nodes    MEDIPORT INSERTION, DOUBLE Left 01/26/2023    TISSUE EXPANDER REMOVAL Bilateral 6/10/2025    Procedure: REMOVAL, TISSUE EXPANDER;  Surgeon:  "Jorge Gutierrez MD;  Location: [facility] OR;  Service: Plastics;  Laterality: Bilateral;    TUBAL LIGATION         Family History   Problem Relation Name Age of Onset    Diabetes Maternal Grandmother Savanna Hamilton     Lung cancer Maternal Grandfather [facility]     Liver cancer Maternal Uncle Edgardo Hamilton        Social History     Socioeconomic History    Marital status: Single   Tobacco Use    Smoking status: Never    Smokeless tobacco: Never   Substance and Sexual Activity    Alcohol use: Yes     Comment: occasionally    Drug use: Never    Sexual activity: Yes     Partners: Male       Current Outpatient Medications   Medication Sig Dispense Refill    boric acid (PH-D) 600 mg vaginal suppository       cyclobenzaprine (FLEXERIL) 5 MG tablet Take 5 mg by mouth.      dextroamphetamine-amphetamine 30 mg Tab Take 1 tablet by mouth 2 (two) times daily.      hydrOXYzine pamoate (VISTARIL) 25 MG Cap Take 1 capsule (25 mg total) by mouth every 8 (eight) hours as needed (itching). 30 capsule 1    LINZESS 290 mcg Cap capsule Take 290 mcg by mouth.      budesonide-formoterol 160-4.5 mcg (SYMBICORT) 160-4.5 mcg/actuation HFAA SMARTSI inhalation By Mouth Twice Daily      OLANZapine (ZYPREXA) 10 MG tablet Take by mouth.      predniSONE (DELTASONE) 20 MG tablet Take 20 mg by mouth.      promethazine (PHENERGAN) 12.5 MG Tab Take 1 tablet (12.5 mg total) by mouth every 4 (four) hours as needed. 60 tablet 1    promethazine-dextromethorphan (PROMETHAZINE-DM) 6.25-15 mg/5 mL Syrp Take by mouth. (Patient not taking: Reported on 2024)      traMADoL (ULTRAM) 50 mg tablet Take 50 mg by mouth every 4 (four) hours.       No current facility-administered medications for this visit.     Review of patient's allergies indicates:  No Known Allergies         Physical Exam:   Blood pressure 125/74, pulse 65, temperature 97.8 °F (36.6 °C), temperature source Oral, resp. rate 12, height 5' 6" (1.676 m), weight 100.4 kg (221 lb 4.8 oz), SpO2 100%. "     ECOG PS 0  GA: AAOx3, NAD  HEENT: NCAT, no tenderness to head with palpation. good dentition, moist oral mucous membranes.  LYMPH: no cervical, axillary or supraclavicular adenopathy  CVS: s1s2 RRR, no M/R/G  RESP: CTA b/l, no crackles, no wheezes or rhonchi  BREAST:  Right breast with well-healed incision without nodularity, new lumps or bumps or skin changes.  Right axilla without evidence of adenopathy.  ABD: soft, NT, ND, BS+, no hepatosplenomegaly  EXT: no deformities, no pedal edema  SKIN: no rashes, warm and dry  NEURO: normal mentation, strength 5/5 on all 4 extremities, no sensory deficits      Assessment:       # Right breast invasive ductal carcinoma, triple negative  ER negative, [place] negative, HER2 IHC 0, Ki-67 85%   [id number], stage IIIC   Discussed with patient the diagnosis of triple negative breast cancer, treatment recommendations including neoadjuvant chemotherapy followed by surgery followed by adjuvant chemotherapy + radiation therapy   Reviewed PET-CT without evidence of distant metastatic disease.  Will plan for treatment with neoadjuvant carbo Taxol pembrolizumab followed by AC pembrolizumab prior to surgical resection   S/p MRI bilateral breasts   Patient is not interested in fertility preservation  She is status post tubal ligation, discuss the need for dual contraception while on chemotherapy.  Baseline Echo 60%  S/p port placement and chemo ed  Status post neoadjuvant chemotherapy and immunotherapy  Genetic counseling done, negative.  S/P Bilateral Mastectomy, with complete pathological response  Status post adjuvant radiation therapy on 11/29/2023  Initiated adjuvant pembrolizumab on 12/18/2023    Plan  Will plan to follow-up q 4 months for next 2 years. No labs needed  Continue follow-up with Dr. Gutierrez and radiation oncology as scheduled.                                No

## (undated) DEVICE — STAPLER SKIN PROXIMATE WIDE

## (undated) DEVICE — NDL SYR 10ML 18X1.5 LL BLUNT

## (undated) DEVICE — NDL SAFETY 21G X 1 1/2 ECLPSE

## (undated) DEVICE — Device

## (undated) DEVICE — DRAPE UTILITY W/ TAPE 20X30IN

## (undated) DEVICE — SYR IRRIGATION BULB STER 60ML

## (undated) DEVICE — SUPPORT ULNA NERVE PROTECTOR

## (undated) DEVICE — SOL NACL IRR 1000ML BTL

## (undated) DEVICE — SEALANT VISTASEAL FIBRIN 10ML

## (undated) DEVICE — DRAPE INCISE IOBAN 2 13X13IN

## (undated) DEVICE — COUNT NDL FOAM MAGNET 40COUNT

## (undated) DEVICE — PAD PREP CUFFED NS 24X48IN

## (undated) DEVICE — COVER PROXIMA MAYO STAND

## (undated) DEVICE — PENCIL SMOKE EVAC ROCKER 70MM

## (undated) DEVICE — FUNNEL KELLER STERILE

## (undated) DEVICE — SET FLUID TRANSFER ASEPTIC

## (undated) DEVICE — BANDAGE GAUZE COT STRL 4.5X4.1

## (undated) DEVICE — BLADE SURG STAINLESS STEEL #10

## (undated) DEVICE — PENCIL SMOKE EVAC TELSCP 15FT

## (undated) DEVICE — DRAIN SURG HUBLESS 30CM 15FR

## (undated) DEVICE — GAUZE DRAIN N WVN 6PLY 4X4IN

## (undated) DEVICE — BANDAGE COHES LTX TAN 4INX5YD

## (undated) DEVICE — ELECTRODE BLADE INSULATED 1 IN

## (undated) DEVICE — BLADE SURG STAINLESS STEEL #15

## (undated) DEVICE — MARKER SKIN RULER AND LABEL

## (undated) DEVICE — SOL NACL .9% 250ML INJ

## (undated) DEVICE — PACK SPY-PHI DRUG DRAPE

## (undated) DEVICE — PAD ABDOMINAL STERILE 8X10IN

## (undated) DEVICE — SPONGE LAP 18X18 PREWASHED

## (undated) DEVICE — RESERVOIR JACKSON-PRATT 100CC

## (undated) DEVICE — CONTAINER SPECIMEN SCREW 4OZ

## (undated) DEVICE — SYS CLSR DERMABOND PRINEO 22CM

## (undated) DEVICE — ELECTRODE PATIENT RETURN DISP

## (undated) DEVICE — BOWL STERILE LG GRAD 32OZ

## (undated) DEVICE — APPLICATOR CHLORAPREP ORN 26ML

## (undated) DEVICE — TOWEL OR DISP STRL BLUE 4/PK

## (undated) DEVICE — GLOVE SENSICARE PI MICRO 7

## (undated) DEVICE — SUT STRATAFIX 3-0 30CM

## (undated) DEVICE — BLANKET WARMING LOWER BODY

## (undated) DEVICE — RETRACTOR ONETRAC SNGL 90INX22

## (undated) DEVICE — SUT STRATAFIX MCRYL 27IN 2-0

## (undated) DEVICE — GLOVE SENSICARE PI SURG 6

## (undated) DEVICE — DRAPE ORTH SPLIT 77X108IN

## (undated) DEVICE — SIZER BREAST NATRELLE 650ML
Type: IMPLANTABLE DEVICE | Site: BREAST | Status: NON-FUNCTIONAL
Removed: 2025-06-10

## (undated) DEVICE — GLOVE SIGNATURE MICRO LTX 8

## (undated) DEVICE — SUT CTD VICRYL 0 UND BR

## (undated) DEVICE — IMPLANTABLE DEVICE
Type: IMPLANTABLE DEVICE | Site: BREAST | Status: NON-FUNCTIONAL
Removed: 2025-06-10

## (undated) DEVICE — TRAY CATH INTMIT POLY 14FR

## (undated) DEVICE — SUT VICRYL PLUS 0 CT1 18IN

## (undated) DEVICE — MARKER FN REG DUAL UTIL RULER

## (undated) DEVICE — DRAPE TOP 53X102IN

## (undated) DEVICE — SUT SILK 2.0 BLK 18

## (undated) DEVICE — SOL NORMAL USPCA 0.9%

## (undated) DEVICE — DRAPE STERI INSTRUMENT 1018

## (undated) DEVICE — TUBING INFILTRATION SNG SPIKE

## (undated) DEVICE — TUBE PAL ASPIR CONN STRL 12FT

## (undated) DEVICE — SUT PLAIN GUT 3-0